# Patient Record
Sex: FEMALE | Race: WHITE | NOT HISPANIC OR LATINO | Employment: FULL TIME | URBAN - NONMETROPOLITAN AREA
[De-identification: names, ages, dates, MRNs, and addresses within clinical notes are randomized per-mention and may not be internally consistent; named-entity substitution may affect disease eponyms.]

---

## 2020-04-22 ENCOUNTER — APPOINTMENT (EMERGENCY)
Dept: RADIOLOGY | Facility: HOSPITAL | Age: 30
End: 2020-04-22
Payer: COMMERCIAL

## 2020-04-22 ENCOUNTER — HOSPITAL ENCOUNTER (EMERGENCY)
Facility: HOSPITAL | Age: 30
Discharge: HOME | End: 2020-04-22
Attending: EMERGENCY MEDICINE | Admitting: EMERGENCY MEDICINE
Payer: COMMERCIAL

## 2020-04-22 VITALS
HEIGHT: 62 IN | DIASTOLIC BLOOD PRESSURE: 78 MMHG | HEART RATE: 92 BPM | TEMPERATURE: 98.4 F | BODY MASS INDEX: 38.64 KG/M2 | OXYGEN SATURATION: 97 % | SYSTOLIC BLOOD PRESSURE: 121 MMHG | RESPIRATION RATE: 18 BRPM | WEIGHT: 210 LBS

## 2020-04-22 DIAGNOSIS — J40 BRONCHITIS: Primary | ICD-10-CM

## 2020-04-22 PROCEDURE — 99282 EMERGENCY DEPT VISIT SF MDM: CPT | Performed by: PHYSICIAN ASSISTANT

## 2020-04-22 PROCEDURE — 71045 X-RAY EXAM CHEST 1 VIEW: CPT

## 2020-04-22 PROCEDURE — 87635 SARS-COV-2 COVID-19 AMP PRB: CPT | Performed by: PHYSICIAN ASSISTANT

## 2020-04-22 PROCEDURE — 99283 EMERGENCY DEPT VISIT LOW MDM: CPT

## 2020-04-22 RX ORDER — ESCITALOPRAM OXALATE 20 MG/1
20 TABLET ORAL
COMMUNITY
Start: 2019-09-29

## 2020-04-22 RX ORDER — TRAZODONE HYDROCHLORIDE 50 MG/1
50 TABLET ORAL
COMMUNITY

## 2020-04-22 RX ORDER — OXYBUTYNIN CHLORIDE 5 MG/1
5 TABLET ORAL
COMMUNITY
Start: 2020-03-18

## 2020-04-23 LAB — SARS-COV-2 RNA SPEC QL NAA+PROBE: NOT DETECTED

## 2022-07-15 ENCOUNTER — OFFICE VISIT (OUTPATIENT)
Dept: FAMILY MEDICINE CLINIC | Facility: CLINIC | Age: 32
End: 2022-07-15
Payer: COMMERCIAL

## 2022-07-15 ENCOUNTER — PATIENT OUTREACH (OUTPATIENT)
Dept: FAMILY MEDICINE CLINIC | Facility: CLINIC | Age: 32
End: 2022-07-15

## 2022-07-15 VITALS
DIASTOLIC BLOOD PRESSURE: 72 MMHG | WEIGHT: 229 LBS | BODY MASS INDEX: 42.14 KG/M2 | OXYGEN SATURATION: 98 % | TEMPERATURE: 98 F | HEIGHT: 62 IN | HEART RATE: 88 BPM | SYSTOLIC BLOOD PRESSURE: 122 MMHG

## 2022-07-15 DIAGNOSIS — Z00.00 ANNUAL PHYSICAL EXAM: Primary | ICD-10-CM

## 2022-07-15 DIAGNOSIS — F51.01 PRIMARY INSOMNIA: ICD-10-CM

## 2022-07-15 DIAGNOSIS — E66.01 MORBID OBESITY WITH BMI OF 40.0-44.9, ADULT (HCC): ICD-10-CM

## 2022-07-15 DIAGNOSIS — F41.9 ANXIETY AND DEPRESSION: ICD-10-CM

## 2022-07-15 DIAGNOSIS — F32.A ANXIETY AND DEPRESSION: ICD-10-CM

## 2022-07-15 DIAGNOSIS — E78.00 ELEVATED LDL CHOLESTEROL LEVEL: ICD-10-CM

## 2022-07-15 DIAGNOSIS — Z80.3 FAMILY HISTORY OF BREAST CANCER: ICD-10-CM

## 2022-07-15 DIAGNOSIS — N32.81 OVERACTIVE BLADDER: ICD-10-CM

## 2022-07-15 PROCEDURE — 99385 PREV VISIT NEW AGE 18-39: CPT | Performed by: NURSE PRACTITIONER

## 2022-07-15 PROCEDURE — 3725F SCREEN DEPRESSION PERFORMED: CPT | Performed by: NURSE PRACTITIONER

## 2022-07-15 RX ORDER — LORAZEPAM 0.5 MG/1
0.5 TABLET ORAL AS NEEDED
COMMUNITY

## 2022-07-15 RX ORDER — MEDROXYPROGESTERONE ACETATE 150 MG/ML
INJECTION, SUSPENSION INTRAMUSCULAR
COMMUNITY
Start: 2022-05-19

## 2022-07-15 NOTE — ASSESSMENT & PLAN NOTE
- PHQ-2 score of 2 today  - Depression main concern lately  - Sees therapist every 3 weeks  - Informed of our behavioral services  - Told to f/u prn

## 2022-07-15 NOTE — PROGRESS NOTES
Called Patient to introduce her to Care Management program  Patient consented to services  Reviewed health status and current weight loss program  Made suggestion to complete a food diary for a couple days and will meet on phone 7/29 to review and make suggestions for food choices

## 2022-07-15 NOTE — ASSESSMENT & PLAN NOTE
-Had recent bloodwork with past PCP  -LDL "still elevated but improved" according to patient      Consider labwork at next visit to re-evaluate

## 2022-07-15 NOTE — PROGRESS NOTES
ADULT ANNUAL [address] IN PARTNERSHIP WITH [facility]    NAME: Merlyn Hamilton  AGE: 27 y o  SEX: female  : 1990     DATE: 7/15/2022     Assessment and Plan:     - reach out if you need any medication refills  - continue GYN services as recommended  - follow-up in 6 months, we will discuss need for labwork at that time   - had labs a few months ago at past PCP  - continue [facility] weight loss program  - up to date with cervical CA screening, done this year with [facility]      Problem List Items Addressed This Visit        Genitourinary    Overactive bladder     Controlled with ditropan, managed by GYN            Other    Anxiety and depression     - PHQ-2 score of 2 today  - Depression main concern lately  - Sees therapist every 3 weeks  - Informed of our behavioral services  - Told to f/u prn         Relevant Medications    LORazepam (ATIVAN) 0 5 mg tablet    Primary insomnia     trazodone effective "more often than not"         Family history of breast cancer     Working with GYN team at [facility] for genetic testing         Elevated LDL cholesterol level     -Had recent bloodwork with past PCP  -LDL "still elevated but improved" according to patient      Consider labwork at next visit to re-evaluate            Morbid obesity with BMI of 40 0-44 9, adult ([address] )     Doing [facility] weight loss program         Relevant Orders    Ambulatory Referral to Complex Care Management Program      Other Visit Diagnoses     Annual physical exam    -  Primary          Immunizations and preventive care screenings were discussed with patient today  Appropriate education was printed on patient's after visit summary  Counseling:  Exercise: the importance of regular exercise/physical activity was discussed  Recommend exercise 3-5 times per week for at least 30 minutes  BMI Counseling: Body mass index is 41 88 kg/m²   The BMI is above normal  Nutrition recommendations include decreasing portion sizes and consuming healthier snacks  Exercise recommendations include exercising 3-5 times per week  No pharmacotherapy was ordered  Rationale for BMI follow-up plan is due to patient being overweight or obese  Return in 6 months (on 1/15/2023) for Recheck  Chief Complaint:     Chief Complaint   Patient presents with    Physical Exam    BMI follow up      History of Present Illness:     Adult Annual Physical   Patient here for a comprehensive physical exam  The patient reports no problems  Diet and Physical Activity  Diet/Nutrition: well balanced diet  Exercise: walking  Depression Screening  PHQ-2/9 Depression Screening    Little interest or pleasure in doing things: 1 - several days  Feeling down, depressed, or hopeless: 1 - several days  PHQ-2 Score: 2  PHQ-2 Interpretation: Negative depression screen       General Health  Sleep: sleeps well  Hearing: normal - bilateral   Vision: no vision problems  Dental: regular dental visits  /GYN Health  Last menstrual period: unknown  History of STDs?: no      Review of Systems:     Review of Systems   Constitutional: Negative  HENT: Negative  Eyes: Negative  Respiratory: Negative  Cardiovascular: Negative  Gastrointestinal: Negative  Endocrine: Negative  Genitourinary: Negative  Musculoskeletal: Negative  Skin: Negative  Allergic/Immunologic: Negative  Neurological: Negative  Hematological: Negative  Psychiatric/Behavioral: Negative  Negative for self-injury and suicidal ideas        Past Medical History:     Past Medical History:   Diagnosis Date    Arthritis     Depression     Migraines       Past Surgical History:     Past Surgical History:   Procedure Laterality Date    SHOULDER ARTHROSCOPY Left     SHOULDER SURGERY      WISDOM TOOTH EXTRACTION      WISDOM TOOTH EXTRACTION        Social History:     Social History Socioeconomic History    Marital status: /Civil Union     Spouse name: None    Number of children: None    Years of education: None    Highest education level: None   Occupational History    None   Tobacco Use    Smoking status: Never Smoker    Smokeless tobacco: Never Used   Vaping Use    Vaping Use: Never used   Substance and Sexual Activity    Alcohol use: Not Currently    Drug use: Never    Sexual activity: None   Other Topics Concern    None   Social History Narrative    None     Social Determinants of Health     Financial Resource Strain: Not on file   Food Insecurity: Not on file   Transportation Needs: Not on file   Physical Activity: Not on file   Stress: Not on file   Social Connections: Not on file   Intimate Partner Violence: Not on file   Housing Stability: Not on file      Family History:     Family History   Problem Relation Age of Onset    Heart failure Mother     Heart disease Father     Thyroid disease Father     Hypertension Maternal Grandfather     Diabetes Paternal Grandmother     Hypertension Paternal Grandmother     Diabetes Paternal Grandfather     Thyroid disease Paternal Aunt     Breast cancer Paternal Aunt       Current Medications:     Current Outpatient Medications   Medication Sig Dispense Refill    escitalopram (LEXAPRO) 20 mg tablet Take 20 mg by mouth      LORazepam (ATIVAN) 0 5 mg tablet Take 0 5 mg by mouth as needed      medroxyPROGESTERone (DEPO-PROVERA) 150 mg/mL injection INJECT 150 MG IM ONCE,INSTR:BRING TO OFFICE FOR ADMINISTRATION      oxybutynin (DITROPAN) 5 mg tablet Take 5 mg by mouth      traZODone (DESYREL) 50 mg tablet Take 50 mg by mouth       No current facility-administered medications for this visit  Allergies:      Allergies   Allergen Reactions    Bupropion Hives    Sulfamethoxazole-Trimethoprim Hives      Physical Exam:     /72 (BP Location: Left arm, Patient Position: Sitting, Cuff Size: Large)   Pulse 88 Temp 98 °F (36 7 °C)   Ht 5' 2" (1 575 m)   Wt 104 kg (229 lb)   SpO2 98%   BMI 41 88 kg/m²     Physical Exam  Vitals and nursing note reviewed  Constitutional:       Appearance: Normal appearance  She is obese  HENT:      Head: Normocephalic  Right Ear: Tympanic membrane normal  There is no impacted cerumen  Left Ear: Tympanic membrane normal       Nose: Nose normal  No congestion  Mouth/Throat:      Mouth: Mucous membranes are moist       Pharynx: Oropharynx is clear  No oropharyngeal exudate  Eyes:      Extraocular Movements: Extraocular movements intact  Conjunctiva/sclera: Conjunctivae normal       Pupils: Pupils are equal, round, and reactive to light  Cardiovascular:      Rate and Rhythm: Normal rate and regular rhythm  Pulses: Normal pulses  Heart sounds: Normal heart sounds  No murmur heard  Pulmonary:      Effort: Pulmonary effort is normal  No respiratory distress  Breath sounds: Normal breath sounds  No wheezing  Abdominal:      General: Bowel sounds are normal       Palpations: Abdomen is soft  Tenderness: There is no abdominal tenderness  Musculoskeletal:         General: No swelling, tenderness, deformity or signs of injury  Normal range of motion  Cervical back: Normal range of motion and neck supple  No tenderness  Right lower leg: No edema  Left lower leg: No edema  Lymphadenopathy:      Cervical: No cervical adenopathy  Skin:     General: Skin is warm and dry  Capillary Refill: Capillary refill takes less than 2 seconds  Findings: No rash  Neurological:      General: No focal deficit present  Mental Status: She is alert and oriented to person, place, and time  Cranial Nerves: No cranial nerve deficit  Sensory: No sensory deficit  Motor: No weakness        Coordination: Coordination normal       Gait: Gait normal       Deep Tendon Reflexes: Reflexes normal    Psychiatric:         Mood and Affect: Mood normal          Behavior: Behavior normal           Ray Gomez, [address] WITH  [facility]

## 2022-07-15 NOTE — PATIENT INSTRUCTIONS
- reach out if you need any medication refills  - continue GYN services as recommended  - follow-up in 6 months, we will discuss need for labwork at that time  - continue [facility] weight loss program        Wellness Visit for Adults   AMBULATORY CARE:   A wellness visit  is when you see your healthcare provider to get screened for health problems  Your healthcare provider will also give you advice on how to stay healthy  Write down your questions so you remember to ask them  Ask your healthcare provider how often you should have a wellness visit  What happens at a wellness visit:  Your healthcare provider will ask about your health, and your family history of health problems  This includes high blood pressure, heart disease, and cancer  He or she will ask if you have symptoms that concern you, if you smoke, and about your mood  You may also be asked about your intake of medicines, supplements, food, and alcohol  Any of the following may be done: Your weight  will be checked  Your height may also be checked so your body mass index (BMI) can be calculated  Your BMI shows if you are at a healthy weight  Your blood pressure  and heart rate will be checked  Your temperature may also be checked  Blood and urine tests  may be done  Blood tests may be done to check your cholesterol levels  Abnormal cholesterol levels increase your risk for heart disease and stroke  You may also need a blood or urine test to check for diabetes if you are at increased risk  Urine tests may be done to look for signs of an infection or kidney disease  A physical exam  includes checking your heartbeat and lungs with a stethoscope  Your healthcare provider may also check your skin to look for sun damage  Screening tests  may be recommended  A screening test is done to check for diseases that may not cause symptoms  The screening tests you may need depend on your age, gender, family history, and lifestyle habits   For example, colorectal screening may be recommended if you are 48years old or older  Screening tests you need if you are a woman:   A Pap smear  is used to screen for cervical cancer  Pap smears are usually done every 3 to 5 years depending on your age  You may need them more often if you have had abnormal Pap smear test results in the past  Ask your healthcare provider how often you should have a Pap smear  A mammogram  is an x-ray of your breasts to screen for breast cancer  Experts recommend mammograms every 2 years starting at age 48 years  You may need a mammogram at age 52 years or younger if you have an increased risk for breast cancer  Talk to your healthcare provider about when you should start having mammograms and how often you need them  Vaccines you may need:   Get an influenza vaccine  every year  The influenza vaccine protects you from the flu  Several types of viruses cause the flu  The viruses change over time, so new vaccines are made each year  Get a tetanus-diphtheria (Td) booster vaccine  every 10 years  This vaccine protects you against tetanus and diphtheria  Tetanus is a severe infection that may cause painful muscle spasms and lockjaw  Diphtheria is a severe bacterial infection that causes a thick covering in the back of your mouth and throat  Get a human papillomavirus (HPV) vaccine  if you are female and aged 23 to 32 or male 23 to 24 and never received it  This vaccine protects you from HPV infection  HPV is the most common infection spread by sexual contact  HPV may also cause vaginal, penile, and anal cancers  Get a pneumococcal vaccine  if you are aged 72 years or older  The pneumococcal vaccine is an injection given to protect you from pneumococcal disease  Pneumococcal disease is an infection caused by pneumococcal bacteria  The infection may cause pneumonia, meningitis, or an ear infection  Get a shingles vaccine  if you are 60 or older, even if you have had shingles before   The shingles vaccine is an injection to protect you from the varicella-zoster virus  This is the same virus that causes chickenpox  Shingles is a painful rash that develops in people who had chickenpox or have been exposed to the virus  How to eat healthy:  My Plate is a model for planning healthy meals  It shows the types and amounts of foods that should go on your plate  Fruits and vegetables make up about half of your plate, and grains and protein make up the other half  A serving of dairy is included on the side of your plate  The amount of calories and serving sizes you need depends on your age, gender, weight, and height  Examples of healthy foods are listed below:  Eat a variety of vegetables  such as dark green, red, and orange vegetables  You can also include canned vegetables low in sodium (salt) and frozen vegetables without added butter or sauces  Eat a variety of fresh fruits , canned fruit in 100% juice, frozen fruit, and dried fruit  Include whole grains  At least half of the grains you eat should be whole grains  Examples include whole-wheat bread, wheat pasta, brown rice, and whole-grain cereals such as oatmeal     Eat a variety of protein foods such as seafood (fish and shellfish), lean meat, and poultry without skin (turkey and chicken)  Examples of lean meats include pork leg, shoulder, or tenderloin, and beef round, sirloin, tenderloin, and extra lean ground beef  Other protein foods include eggs and egg substitutes, beans, peas, soy products, nuts, and seeds  Choose low-fat dairy products such as skim or 1% milk or low-fat yogurt, cheese, and cottage cheese  Limit unhealthy fats  such as butter, hard margarine, and shortening  Exercise:  Exercise at least 30 minutes per day on most days of the week  Some examples of exercise include walking, biking, dancing, and swimming   You can also fit in more physical activity by taking the stairs instead of the elevator or parking farther away from stores  Include muscle strengthening activities 2 days each week  Regular exercise provides many health benefits  It helps you manage your weight, and decreases your risk for type 2 diabetes, heart disease, stroke, and high blood pressure  Exercise can also help improve your mood  Ask your healthcare provider about the best exercise plan for you  General health and safety guidelines:   Do not smoke  Nicotine and other chemicals in cigarettes and cigars can cause lung damage  Ask your healthcare provider for information if you currently smoke and need help to quit  E-cigarettes or smokeless tobacco still contain nicotine  Talk to your healthcare provider before you use these products  Limit alcohol  A drink of alcohol is 12 ounces of beer, 5 ounces of wine, or 1½ ounces of liquor  Lose weight, if needed  Being overweight increases your risk of certain health conditions  These include heart disease, high blood pressure, type 2 diabetes, and certain types of cancer  Protect your skin  Do not sunbathe or use tanning beds  Use sunscreen with a SPF 15 or higher  Apply sunscreen at least 15 minutes before you go outside  Reapply sunscreen every 2 hours  Wear protective clothing, hats, and sunglasses when you are outside  Drive safely  Always wear your seatbelt  Make sure everyone in your car wears a seatbelt  A seatbelt can save your life if you are in an accident  Do not use your cell phone when you are driving  This could distract you and cause an accident  Pull over if you need to make a call or send a text message  Practice safe sex  Use latex condoms if are sexually active and have more than one partner  Your healthcare provider may recommend screening tests for sexually transmitted infections (STIs)  Wear helmets, lifejackets, and protective gear  Always wear a helmet when you ride a bike or motorcycle, go skiing, or play sports that could cause a head injury   Wear protective equipment when you play sports  Wear a lifejacket when you are on a boat or doing water sports  © Copyright [employer] 2022 Information is for End User's use only and may not be sold, redistributed or otherwise used for commercial purposes  All illustrations and images included in CareNotes® are the copyrighted property of A D A M , Inc  or Sunil Hung   The above information is an  only  It is not intended as medical advice for individual conditions or treatments  Talk to your doctor, nurse or pharmacist before following any medical regimen to see if it is safe and effective for you  Weight Management   AMBULATORY CARE:   Why it is important to manage your weight:  Being overweight increases your risk of health conditions such as heart disease, high blood pressure, type 2 diabetes, and certain types of cancer  It can also increase your risk for osteoarthritis, sleep apnea, and other respiratory problems  Aim for a slow, steady weight loss  Even a small amount of weight loss can lower your risk of health problems  Risks of being overweight:  Extra weight can cause many health problems, including the following:  Diabetes (high blood sugar level)    High blood pressure or high cholesterol    Heart disease    Stroke    Gallbladder or liver disease    Cancer of the colon, breast, prostate, liver, or kidney    Sleep apnea    Arthritis or gout    Screening  is done to check for health conditions before you have signs or symptoms  If you are 28to 79years old, your blood sugar level may be checked every 3 years for signs of prediabetes or diabetes  Your healthcare provider will check your blood pressure at each visit  High blood pressure can lead to a stroke or other problems  Your provider may check for signs of heart disease, cancer, or other health problems  How to lose weight safely:  A safe and healthy way to lose weight is to eat fewer calories and get regular exercise    You can lose up about 1 pound a week by decreasing the number of calories you eat by 500 calories each day  You can decrease calories by eating smaller portion sizes or by cutting out high-calorie foods  Read labels to find out how many calories are in the foods you eat  You can also burn calories with exercise such as walking, swimming, or biking  You will be more likely to keep weight off if you make these changes part of your lifestyle  Exercise at least 30 minutes per day on most days of the week  You can also fit in more physical activity by taking the stairs instead of the elevator or parking farther away from stores  Ask your healthcare provider about the best exercise plan for you  Healthy meal plan for weight management:  A healthy meal plan includes a variety of foods, contains fewer calories, and helps you stay healthy  A healthy meal plan includes the following:     Eat whole-grain foods more often  A healthy meal plan should contain fiber  Fiber is the part of grains, fruits, and vegetables that is not broken down by your body  Whole-grain foods are healthy and provide extra fiber in your diet  Some examples of whole-grain foods are whole-wheat breads and pastas, oatmeal, brown rice, and bulgur  Eat a variety of vegetables every day  Include dark, leafy greens such as spinach, kale, nima greens, and mustard greens  Eat yellow and orange vegetables such as carrots, sweet potatoes, and winter squash  Eat a variety of fruits every day  Choose fresh or canned fruit (canned in its own juice or light syrup) instead of juice  Fruit juice has very little or no fiber  Eat low-fat dairy foods  Drink fat-free (skim) milk or 1% milk  Eat fat-free yogurt and low-fat cottage cheese  Try low-fat cheeses such as mozzarella and other reduced-fat cheeses  Choose meat and other protein foods that are low in fat  Choose beans or other legumes such as split peas or lentils   Choose fish, skinless poultry (chicken or turkey), or lean cuts of red meat (beef or pork)  Before you cook meat or poultry, cut off any visible fat  Use less fat and oil  Try baking foods instead of frying them  Add less fat, such as margarine, sour cream, regular salad dressing and mayonnaise to foods  Eat fewer high-fat foods  Some examples of high-fat foods include french fries, doughnuts, ice cream, and cakes  Eat fewer sweets  Limit foods and drinks that are high in sugar  This includes candy, cookies, regular soda, and sweetened drinks  Ways to decrease calories:   Eat smaller portions  Use a small plate with smaller servings  Do not eat second helpings  When you eat at a restaurant, ask for a box and place half of your meal in the box before you eat  Share an entrée with someone else  Replace high-calorie snacks with healthy, low-calorie snacks  Choose fresh fruit, vegetables, fat-free rice cakes, or air-popped popcorn instead of potato chips, nuts, or chocolate  Choose water or calorie-free drinks instead of soda or sweetened drinks  Do not shop for groceries when you are hungry  You may be more likely to make unhealthy food choices  Take a grocery list of healthy foods and shop after you have eaten  Eat regular meals  Do not skip meals  Skipping meals can lead to overeating later in the day  This can make it harder for you to lose weight  Eat a healthy snack in place of a meal if you do not have time to eat a regular meal  Talk with a dietitian to help you create a meal plan and schedule that is right for you  Other things to consider as you try to lose weight:   Be aware of situations that may give you the urge to overeat, such as eating while watching television  Find ways to avoid these situations  For example, read a book, go for a walk, or do crafts  Meet with a weight loss support group or friends who are also trying to lose weight   This may help you stay motivated to continue working on your weight loss goals  © Copyright [employer] 2022 Information is for End User's use only and may not be sold, redistributed or otherwise used for commercial purposes  All illustrations and images included in CareNotes® are the copyrighted property of A D A M , Inc  or Sunil Hernandez  The above information is an  only  It is not intended as medical advice for individual conditions or treatments  Talk to your doctor, nurse or pharmacist before following any medical regimen to see if it is safe and effective for you

## 2022-07-29 ENCOUNTER — PATIENT OUTREACH (OUTPATIENT)
Dept: FAMILY MEDICINE CLINIC | Facility: CLINIC | Age: 32
End: 2022-07-29

## 2022-07-29 NOTE — PROGRESS NOTES
Had touch base time schedule with patient for today at 1000  Unable to reach patent  Left message with contact phone number and hours available  Requested return call to follow up on progress with [facility] program as well as recording calories for baseline assessment  Will reach out again next week if no return call

## 2022-08-05 ENCOUNTER — PATIENT OUTREACH (OUTPATIENT)
Dept: FAMILY MEDICINE CLINIC | Facility: CLINIC | Age: 32
End: 2022-08-05

## 2022-08-05 NOTE — PROGRESS NOTES
Second attempt made to connect with patient after she missed scheduled call on 7/29  Unable to reach patient, voicemail left with callback information and hours I can be reached  Unable to reach letter will be sent Monday if no response today

## 2022-08-08 ENCOUNTER — PATIENT OUTREACH (OUTPATIENT)
Dept: FAMILY MEDICINE CLINIC | Facility: CLINIC | Age: 32
End: 2022-08-08

## 2022-08-08 NOTE — LETTER
Date: 08/08/22    Dear Lashon Camarillo,   I have not been able to reach you since our initial conversation and would like to set a time that I can talk with you over the phone or in-person  I would like to see how you are doing with the [facility] program  Signing up for a program to help modify your lifestyle and make better choices in order to optimize your health is a big decision and I am here to help you be successful  I have enclosed some additional information for you  Please call me with any questions you may have     Sincerely,    Cherie Moses, MSN RN  [phone]  Outpatient Care Manager

## 2022-08-08 NOTE — PROGRESS NOTES
Patient missed scheduled phone call  Follow up calls were not received nor any calls returned   Unable to reach letter sent and removed self from care team

## 2022-08-22 ENCOUNTER — PATIENT OUTREACH (OUTPATIENT)
Dept: FAMILY MEDICINE CLINIC | Facility: CLINIC | Age: 32
End: 2022-08-22

## 2022-08-22 NOTE — PROGRESS NOTES
Sent patient a [employer] message to notify her that due to lack of response I will be discontinuing care management services with her  I provided my direct number and hours available should she desire to have any additional help with health management

## 2022-08-31 ENCOUNTER — OFFICE VISIT (OUTPATIENT)
Dept: FAMILY MEDICINE CLINIC | Facility: CLINIC | Age: 32
End: 2022-08-31
Payer: COMMERCIAL

## 2022-08-31 VITALS
BODY MASS INDEX: 42.14 KG/M2 | TEMPERATURE: 97.8 F | SYSTOLIC BLOOD PRESSURE: 122 MMHG | HEIGHT: 62 IN | DIASTOLIC BLOOD PRESSURE: 76 MMHG | HEART RATE: 70 BPM | OXYGEN SATURATION: 98 % | WEIGHT: 229 LBS

## 2022-08-31 DIAGNOSIS — R53.83 FATIGUE, UNSPECIFIED TYPE: ICD-10-CM

## 2022-08-31 DIAGNOSIS — J01.90 ACUTE NON-RECURRENT SINUSITIS, UNSPECIFIED LOCATION: Primary | ICD-10-CM

## 2022-08-31 DIAGNOSIS — H92.01 EAR PAIN, RIGHT: ICD-10-CM

## 2022-08-31 DIAGNOSIS — Z02.1 NEED FOR HISTORY AND PHYSICAL EXAMINATION FOR EMPLOYMENT: ICD-10-CM

## 2022-08-31 DIAGNOSIS — J06.9 VIRAL UPPER RESPIRATORY TRACT INFECTION: ICD-10-CM

## 2022-08-31 LAB
SARS-COV-2 AG UPPER RESP QL IA: NEGATIVE
VALID CONTROL: NORMAL

## 2022-08-31 PROCEDURE — 87811 SARS-COV-2 COVID19 W/OPTIC: CPT | Performed by: NURSE PRACTITIONER

## 2022-08-31 PROCEDURE — 99213 OFFICE O/P EST LOW 20 MIN: CPT | Performed by: NURSE PRACTITIONER

## 2022-08-31 PROCEDURE — 86580 TB INTRADERMAL TEST: CPT

## 2022-08-31 PROCEDURE — AMOXICILLIN 875MG 20 AMOXICILLIN 875MG 20: Performed by: NURSE PRACTITIONER

## 2022-08-31 RX ORDER — AMOXICILLIN AND CLAVULANATE POTASSIUM 875; 125 MG/1; MG/1
1 TABLET, FILM COATED ORAL EVERY 12 HOURS SCHEDULED
Qty: 20 TABLET | Refills: 0
Start: 2022-08-31 | End: 2022-09-10

## 2022-08-31 NOTE — PATIENT INSTRUCTIONS
Start taking antibiotic if s/s do not improve by Friday  Call the office with any questions or concerns    Sinusitis   AMBULATORY CARE:   Sinusitis  is inflammation or infection of your sinuses  Sinusitis is most often caused by a virus  Acute sinusitis may last up to 12 weeks  Chronic sinusitis lasts longer than 12 weeks  Recurrent sinusitis means you have 4 or more infections in 1 year  Common signs and symptoms:   Fever    Pain, pressure, redness, or swelling around the forehead, cheeks, or eyes    Thick yellow or green discharge from your nose    Tenderness when you touch your face over your sinuses    Dry cough that happens mostly at night or when you lie down    Headache and face pain that is worse when you lean forward    Tooth pain, or pain when you chew    Seek care immediately if:   You have trouble breathing or wheezing that is getting worse  You have a stiff neck, a fever, or a bad headache  You cannot open your eye  Your eyeball bulges out or you cannot move your eye  You are more sleepy than normal, or you notice changes in your ability to think, move, or talk  You have swelling of your forehead or scalp  Call your doctor if:   You have vision changes, such as double vision  Your eye and eyelid are red, swollen, and painful  Your symptoms do not improve or go away after 10 days  You have nausea and are vomiting  Your nose is bleeding  You have questions or concerns about your condition or care  Medicines: Your symptoms may go away on their own  Your healthcare provider may recommend watchful waiting for up to 10 days before starting antibiotics  You may need any of the following:  Acetaminophen  decreases pain and fever  It is available without a doctor's order  Ask how much to take and how often to take it  Follow directions   Read the labels of all other medicines you are using to see if they also contain acetaminophen, or ask your doctor or pharmacist  Acetaminophen can cause liver damage if not taken correctly  Do not use more than 4 grams (4,000 milligrams) total of acetaminophen in one day  NSAIDs , such as ibuprofen, help decrease swelling, pain, and fever  This medicine is available with or without a doctor's order  NSAIDs can cause stomach bleeding or kidney problems in certain people  If you take blood thinner medicine, always ask your healthcare provider if NSAIDs are safe for you  Always read the medicine label and follow directions  Nasal steroid sprays  may help decrease inflammation in your nose and sinuses  Decongestants  help reduce swelling and drain mucus in the nose and sinuses  They may help you breathe easier  Antihistamines  help dry mucus in the nose and relieve sneezing  Antibiotics  help treat or prevent a bacterial infection  Self-care:   Rinse your sinuses as directed  Use a sinus rinse device to rinse your nasal passages with a saline (salt water) solution or distilled water  Do not use tap water  This will help thin the mucus in your nose and rinse away pollen and dirt  It will also help reduce swelling so you can breathe normally  Use a humidifier  to increase air moisture in your home  This may make it easier for you to breathe and help decrease your cough  Sleep with your head elevated  Place an extra pillow under your head before you go to sleep to help your sinuses drain  Drink liquids as directed  Ask your healthcare provider how much liquid to drink each day and which liquids are best for you  Liquids will thin the mucus in your nose and help it drain  Avoid drinks that contain alcohol or caffeine  Do not smoke, and avoid secondhand smoke  Nicotine and other chemicals in cigarettes and cigars can make your symptoms worse  Ask your healthcare provider for information if you currently smoke and need help to quit  E-cigarettes or smokeless tobacco still contain nicotine   Talk to your healthcare provider before you use these products  Prevent the spread of germs:   Wash your hands often with soap and water  Wash your hands after you use the bathroom, change a child's diaper, or sneeze  Wash your hands before you prepare or eat food  Stay away from people who are sick  Some germs spread easily and quickly through contact  Follow up with your doctor as directed: You may be referred to an ear, nose, and throat specialist  Write down your questions so you remember to ask them during your visits  © Copyright [employer] 2022 Information is for End User's use only and may not be sold, redistributed or otherwise used for commercial purposes  All illustrations and images included in CareNotes® are the copyrighted property of [employer]  or Sunil Hernandez  The above information is an  only  It is not intended as medical advice for individual conditions or treatments  Talk to your doctor, nurse or pharmacist before following any medical regimen to see if it is safe and effective for you

## 2022-08-31 NOTE — PROGRESS NOTES
Assessment/Plan:    POC covid test negative today    Presentation suggestive rhinosinusitis, viral versus bacterial   Patient is at day 7 of symptoms  Currently experiencing a double sickening  Provided patient with 10 day regimen of Augmentin for her to start on Friday or Saturday if signs and symptoms fail to improve  Discussed what signs and symptoms warrant emergent medical care  Patient verbalized understanding  PPD test also administered for adoption paperwork  No problem-specific Assessment & Plan notes found for this encounter  Diagnoses and all orders for this visit:    Acute non-recurrent sinusitis, unspecified location  -     amoxicillin-clavulanate (Augmentin) 875-125 mg per tablet; Take 1 tablet by mouth every 12 (twelve) hours for 10 days    Viral upper respiratory tract infection  -     POCT Rapid Covid Ag    Ear pain, right    Fatigue, unspecified type    Need for history and physical examination for employment  -     TB Skin Test        Subjective:      Patient ID: Arline Muñoz is a 27 y o  female  - Onset of symptoms/condition: Thursday  N/V Th-Sat, fever (TMAX 103 last Friday, temp broke that night), lump in throat and R ear pain which developed last night     - Alleviated with: better in AM hours    - Treatments so far: hot compresses help with earache    Principal positive for covid last week           The following portions of the patient's history were reviewed and updated as appropriate: allergies, current medications, past family history, past medical history, past social history, past surgical history and problem list     Review of Systems   Constitutional: Positive for fatigue and fever  HENT: Positive for ear pain  Negative for congestion, ear discharge, sinus pain, sore throat and trouble swallowing          + lump in throat   Eyes: Negative  Respiratory: Negative  Negative for cough, shortness of breath and wheezing  Cardiovascular: Negative    Negative for chest pain  Gastrointestinal: Negative  Negative for nausea and vomiting  Endocrine: Negative  Genitourinary: Negative  Musculoskeletal: Positive for arthralgias  Skin: Negative  Allergic/Immunologic: Negative  Neurological: Negative  Negative for dizziness, light-headedness and headaches  Hematological: Negative  Psychiatric/Behavioral: Negative  Objective:      /76 (BP Location: Left arm, Patient Position: Sitting, Cuff Size: Large)   Pulse 70   Temp 97 8 °F (36 6 °C)   Ht 5' 2" (1 575 m)   Wt 104 kg (229 lb)   SpO2 98%   BMI 41 88 kg/m²          Physical Exam  Vitals and nursing note reviewed  Constitutional:       Appearance: Normal appearance  HENT:      Head: Normocephalic  Right Ear: Tympanic membrane and ear canal normal  There is no impacted cerumen  Left Ear: Tympanic membrane and ear canal normal  There is no impacted cerumen  Nose: Nose normal  No congestion  Mouth/Throat:      Mouth: Mucous membranes are moist       Pharynx: Posterior oropharyngeal erythema present  No oropharyngeal exudate  Tonsils: No tonsillar exudate or tonsillar abscesses  1+ on the right  1+ on the left  Eyes:      Extraocular Movements: Extraocular movements intact  Conjunctiva/sclera: Conjunctivae normal       Pupils: Pupils are equal, round, and reactive to light  Neck:        Comments: Tenderness R anterior neck upon palpation  Cardiovascular:      Rate and Rhythm: Normal rate and regular rhythm  Pulses: Normal pulses  Heart sounds: Normal heart sounds  Pulmonary:      Effort: Pulmonary effort is normal       Breath sounds: Normal breath sounds  Abdominal:      General: Bowel sounds are normal       Palpations: Abdomen is soft  Tenderness: There is no abdominal tenderness  Musculoskeletal:         General: Normal range of motion  Cervical back: Normal range of motion     Lymphadenopathy:      Cervical: No cervical adenopathy  Skin:     General: Skin is warm and dry  Capillary Refill: Capillary refill takes less than 2 seconds  Neurological:      General: No focal deficit present  Mental Status: She is alert and oriented to person, place, and time     Psychiatric:         Mood and Affect: Mood normal          Behavior: Behavior normal

## 2022-09-01 ENCOUNTER — RA CDI HCC (OUTPATIENT)
Dept: OTHER | Facility: HOSPITAL | Age: 32
End: 2022-09-01

## 2022-09-01 NOTE — PROGRESS NOTES
Please review if this dx  is applicable to the patient's condition and assess and document, if applicable in next visit     Morbid obesity ([facility] [address] ) [278 01  ICD-9-CM]    Major depressive disorder, single episode, mild (HCC) [296 21  ICD-9-CM]    [facility] [address]  coding opportunities          Chart Reviewed number of suggestions sent to Provider: 2     Patients Insurance        Commercial Insurance: [address] [place] [address]

## 2022-09-02 ENCOUNTER — CLINICAL SUPPORT (OUTPATIENT)
Dept: FAMILY MEDICINE CLINIC | Facility: CLINIC | Age: 32
End: 2022-09-02

## 2022-09-02 VITALS
SYSTOLIC BLOOD PRESSURE: 120 MMHG | HEART RATE: 88 BPM | BODY MASS INDEX: 42.14 KG/M2 | HEIGHT: 62 IN | TEMPERATURE: 98.2 F | DIASTOLIC BLOOD PRESSURE: 70 MMHG | OXYGEN SATURATION: 98 % | WEIGHT: 229 LBS

## 2022-09-02 DIAGNOSIS — Z11.1 ENCOUNTER FOR PPD SKIN TEST READING: Primary | ICD-10-CM

## 2022-09-02 LAB
INDURATION: 0 MM
TB SKIN TEST: NEGATIVE

## 2022-11-14 ENCOUNTER — CLINICAL SUPPORT (OUTPATIENT)
Dept: FAMILY MEDICINE CLINIC | Facility: CLINIC | Age: 32
End: 2022-11-14

## 2022-11-14 VITALS
DIASTOLIC BLOOD PRESSURE: 76 MMHG | WEIGHT: 229 LBS | HEIGHT: 62 IN | BODY MASS INDEX: 42.14 KG/M2 | SYSTOLIC BLOOD PRESSURE: 124 MMHG

## 2022-11-14 DIAGNOSIS — Z78.9 ALWAYS USES CONTRACEPTION: Primary | ICD-10-CM

## 2022-11-14 RX ORDER — MEDROXYPROGESTERONE ACETATE 150 MG/ML
150 INJECTION, SUSPENSION INTRAMUSCULAR ONCE
Status: COMPLETED | OUTPATIENT
Start: 2022-11-14 | End: 2022-11-14

## 2022-11-14 RX ADMIN — MEDROXYPROGESTERONE ACETATE 150 MG: 150 INJECTION, SUSPENSION INTRAMUSCULAR at 15:40

## 2022-12-16 ENCOUNTER — TELEPHONE (OUTPATIENT)
Dept: FAMILY MEDICINE CLINIC | Facility: CLINIC | Age: 32
End: 2022-12-16

## 2022-12-16 ENCOUNTER — OFFICE VISIT (OUTPATIENT)
Dept: FAMILY MEDICINE CLINIC | Facility: CLINIC | Age: 32
End: 2022-12-16

## 2022-12-16 DIAGNOSIS — J06.9 VIRAL URI: Primary | ICD-10-CM

## 2022-12-16 DIAGNOSIS — J02.9 SORE THROAT: ICD-10-CM

## 2022-12-16 LAB
SARS-COV-2 AG UPPER RESP QL IA: NEGATIVE
VALID CONTROL: NORMAL

## 2022-12-16 NOTE — PATIENT INSTRUCTIONS
Treatment- Increase fluids, rest, acetaminophen or ibuprofen for fever/aches  OTC cold remedies OK but not too helpful, avoid OTC nasal Afrin-may use them for 1-2 days only due to rebound effect  Chicken soup, tea with honey and lemon (grandma's remedies work as well if not better than OTC's)      Viral symptoms should resolve in 1-2 weeks, please contact the office if issues persist

## 2022-12-16 NOTE — PROGRESS NOTES
Name: Ewa Dobson      : 1990      MRN: [id number]  Encounter Provider: CLARENCE Henley  Encounter Date: 2022   Encounter department: [address] [place] [address] test was negative  Presentation is most consistent with a viral URI, alternate differential influenza  Advised patient to continue with conservative measures over the weekend (rest, hydration, and OTC analgesics) and to call the office on Monday if her condition worsens or fails to improve  Discussed what signs and symptoms warrant emergent medical care  Patient verbalized understanding  1  Viral URI    2  Sore throat  -     POCT Rapid Covid Ag         Subjective      Patient reports having nausea, vomiting, and diarrhea that started on Monday  That has since resolved but on Wednesday, patient developed the following: Sore throat, fatigue, fever, and body aches  Patient reports having a fever last night of 101 9 °F   Patient's spouse is also sick currently  Patient reports having COVID-19 in January of this year and also reports being exposed to a COVID-positive patient last weekend  Patient denies respiratory distress  Patient did an at-home COVID test on Wednesday which was negative  Patient has tried rest and hot fluids for symptom control with minimal relief  Review of Systems   Constitutional: Positive for fatigue and fever  HENT: Positive for sore throat  Eyes: Negative  Respiratory: Negative  Cardiovascular: Negative  Gastrointestinal: Negative  Negative for abdominal pain, diarrhea, nausea and vomiting  Endocrine: Negative  Genitourinary: Negative  Musculoskeletal: Positive for myalgias  Skin: Negative  Allergic/Immunologic: Negative  Neurological: Negative  Hematological: Negative  Psychiatric/Behavioral: Negative          Current Outpatient Medications on File Prior to Visit   Medication Sig   • escitalopram (LEXAPRO) 20 mg tablet Take 20 mg by mouth   • LORazepam (ATIVAN) 0 5 mg tablet Take 0 5 mg by mouth as needed   • medroxyPROGESTERone (DEPO-PROVERA) 150 mg/mL injection INJECT 150 MG IM ONCE,INSTR:BRING TO OFFICE FOR ADMINISTRATION   • oxybutynin (DITROPAN) 5 mg tablet Take 5 mg by mouth   • traZODone (DESYREL) 50 mg tablet Take 50 mg by mouth       Objective     There were no vitals taken for this visit  Physical Exam  Constitutional:       General: She is not in acute distress  Appearance: Normal appearance  She is ill-appearing  HENT:      Head: Normocephalic  Right Ear: Tympanic membrane normal       Left Ear: Tympanic membrane normal       Nose: Nose normal  No congestion or rhinorrhea  Mouth/Throat:      Mouth: Mucous membranes are moist       Pharynx: Oropharynx is clear  Posterior oropharyngeal erythema present  No oropharyngeal exudate  Eyes:      Conjunctiva/sclera: Conjunctivae normal    Cardiovascular:      Rate and Rhythm: Normal rate and regular rhythm  Pulses: Normal pulses  Heart sounds: Normal heart sounds  Pulmonary:      Effort: Pulmonary effort is normal       Breath sounds: Normal breath sounds  Musculoskeletal:      Cervical back: Normal range of motion  Lymphadenopathy:      Cervical: Cervical adenopathy present  Skin:     General: Skin is warm and dry  Findings: No rash  Neurological:      General: No focal deficit present  Mental Status: She is alert and oriented to person, place, and time     Psychiatric:         Mood and Affect: Mood normal          Behavior: Behavior normal        CLARENCE Lopez

## 2022-12-16 NOTE — LETTER
December 16, 2022     Patient: Tanner Garcia  YOB: 1990  Date of Visit: 12/16/2022      To Whom it May Concern:    Nelda Bauman is under my professional care  River Esquivel was seen in my office on 12/16/2022  Teagangatito Esquivel may return to work on 12/19/22  Please excuse any absences the week of December 11th  If you have any questions or concerns, please don't hesitate to call           Sincerely,          CLARENCE Cornell        CC: No Recipients

## 2022-12-20 DIAGNOSIS — B96.89 BACTERIAL UPPER RESPIRATORY INFECTION: Primary | ICD-10-CM

## 2022-12-20 DIAGNOSIS — J06.9 BACTERIAL UPPER RESPIRATORY INFECTION: Primary | ICD-10-CM

## 2022-12-20 RX ORDER — AMOXICILLIN AND CLAVULANATE POTASSIUM 875; 125 MG/1; MG/1
1 TABLET, FILM COATED ORAL EVERY 12 HOURS SCHEDULED
Qty: 14 TABLET | Refills: 0 | Status: SHIPPED | OUTPATIENT
Start: 2022-12-20 | End: 2022-12-21 | Stop reason: SDUPTHER

## 2022-12-20 NOTE — PROGRESS NOTES
Patient's condition failed to improve with approximately 4 to 5 days of conservative treatment  Will send a 7-day regimen of Augmentin to the pharmacy on file to treat one of the following differentials: Bacterial URI vs ABRS

## 2022-12-21 DIAGNOSIS — J06.9 BACTERIAL UPPER RESPIRATORY INFECTION: ICD-10-CM

## 2022-12-21 DIAGNOSIS — B96.89 BACTERIAL UPPER RESPIRATORY INFECTION: ICD-10-CM

## 2022-12-21 RX ORDER — AMOXICILLIN AND CLAVULANATE POTASSIUM 875; 125 MG/1; MG/1
1 TABLET, FILM COATED ORAL EVERY 12 HOURS SCHEDULED
Qty: 14 TABLET | Refills: 0 | Status: SHIPPED | OUTPATIENT
Start: 2022-12-21 | End: 2022-12-28

## 2022-12-22 ENCOUNTER — OFFICE VISIT (OUTPATIENT)
Dept: OBGYN CLINIC | Facility: CLINIC | Age: 32
End: 2022-12-22

## 2022-12-22 VITALS
HEIGHT: 62 IN | HEART RATE: 111 BPM | DIASTOLIC BLOOD PRESSURE: 86 MMHG | SYSTOLIC BLOOD PRESSURE: 118 MMHG | BODY MASS INDEX: 41.22 KG/M2 | WEIGHT: 224 LBS | TEMPERATURE: 98.9 F

## 2022-12-22 DIAGNOSIS — Z12.31 ENCOUNTER FOR SCREENING MAMMOGRAM FOR BREAST CANCER: ICD-10-CM

## 2022-12-22 DIAGNOSIS — Z80.3 FAMILY HISTORY OF BREAST CANCER: Primary | ICD-10-CM

## 2022-12-22 DIAGNOSIS — Z12.39 BREAST CANCER SCREENING, HIGH RISK PATIENT: ICD-10-CM

## 2022-12-22 NOTE — PROGRESS NOTES
Assessment        Diagnoses and all orders for this visit:    Family history of breast cancer  -     Mammo screening bilateral w 3d & cad; Future    Breast cancer screening, high risk patient  -     Ambulatory Referral to Oncology Genetics; Future  -     Mammo screening bilateral w 3d & cad; Future    Encounter for screening mammogram for breast cancer  -     Mammo screening bilateral w 3d & cad; Future              Plan      All questions answered  Diagnosis explained in detail, including differential   Educational material distributed  Follow up in 7 months  Follow up as needed  Mammogram    Patient referred to genetic oncology for counseling and potential testing  Patient concerned about her risk for developing breast cancer  Discussed possible need for referral to surgical oncology for supplemental breast screening if indicated  Breast exam is normal today  Baseline mammogram was ordered  Follow-up in July for annual GYN exam    Subjective      Maria De Jesus Benitez is a 34 y o  female who presents to [facility] care      Chief Complaint   Patient presents with   • New Patient Visit     Patient with extended family history of breast cancer  Pt is interested in breast cancer genetic screening  Patient personally does not have any breast concerns at this moment  She is sexually active  She has a female partner,  x 11 years    She has no breast lumps, no pain, no nipple discharge  She has multiple family members with breast cancer  They all have the CHEK2 variant    She is on DEPO PROVERA due to recurrent ovarian cyst   She does not desire children  She has been on Depo for 8 years    Last Depo in November (gets this done at [address] [place] [address])    Last Pap:  7/2022 Normal per Brian canela Reading  Last mammogram: none    HPV vaccine completed:no  Current contraception: none  History of abnormal Pap smear: no  History of abnormal mammogram: no  Family history of uterine or ovarian cancer: maternal GGM (unknown age)  Family history of breast cancer: dad's half sister (late 45s)  Dad's 2 cousins (late 45s, early 46s)  2 Paternal great aunts (both 46s)    Family history of colon cancer: no        Menstrual History:  OB History        0    Para   0    Term   0       0    AB   0    Living   0       SAB   0    IAB   0    Ectopic   0    Multiple   0    Live Births   0                Menarche age: 7  No LMP recorded (lmp unknown)               Past Medical History:   Diagnosis Date   • Arthritis    • Depression    • Migraines      Past Surgical History:   Procedure Laterality Date   • SHOULDER ARTHROSCOPY Left    • SHOULDER SURGERY     • WISDOM TOOTH EXTRACTION     • WISDOM TOOTH EXTRACTION       Family History   Problem Relation Age of Onset   • Heart failure Mother    • Heart disease Father    • Thyroid disease Father    • Hypertension Maternal Grandfather    • Diabetes Paternal Grandmother    • Hypertension Paternal Grandmother    • Diabetes Paternal Grandfather    • Thyroid disease Paternal [address]    • Breast cancer Paternal [address]    • Breast cancer Paternal [address]    • Breast cancer Paternal Aunt    • Breast cancer Cousin    • Breast cancer Cousin        Social History     Tobacco Use   • Smoking status: Never   • Smokeless tobacco: Never   Vaping Use   • Vaping Use: Never used   Substance Use Topics   • Alcohol use: Not Currently   • Drug use: Never          Current Outpatient Medications:   •  amoxicillin-clavulanate (Augmentin) 875-125 mg per tablet, Take 1 tablet by mouth every 12 (twelve) hours for 7 days, Disp: 14 tablet, Rfl: 0  •  escitalopram (LEXAPRO) 20 mg tablet, Take 20 mg by mouth, Disp: , Rfl:   •  LORazepam (ATIVAN) 0 5 mg tablet, Take 0 5 mg by mouth as needed, Disp: , Rfl:   •  medroxyPROGESTERone (DEPO-PROVERA) 150 mg/mL injection, INJECT 150 MG IM ONCE,INSTR:BRING TO OFFICE FOR ADMINISTRATION, Disp: , Rfl:   •  oxybutynin (DITROPAN) 5 mg tablet, Take 5 mg by mouth, Disp: , Rfl:   • traZODone (DESYREL) 50 mg tablet, Take 50 mg by mouth, Disp: , Rfl:     Allergies   Allergen Reactions   • Bupropion Hives   • Sulfamethoxazole-Trimethoprim Hives           Review of Systems   Constitutional: Negative  HENT: Negative  Eyes: Negative  Respiratory: Negative  Cardiovascular: Negative  Gastrointestinal: Negative  Endocrine: Negative  Genitourinary:        As noted in HPI   Musculoskeletal: Negative  Skin: Negative  Allergic/Immunologic: Negative  Neurological: Negative  Hematological: Negative  Psychiatric/Behavioral: Negative  /86 (BP Location: Right arm, Patient Position: Sitting, Cuff Size: Adult)   Pulse (!) 111   Temp 98 9 °F (37 2 °C) (Tympanic)   Ht 5' 2" (1 575 m)   Wt 102 kg (224 lb)   LMP  (LMP Unknown) Comment: Patient does not get menses with depo injection  BMI 40 97 kg/m²         Physical Exam  Constitutional:       Appearance: Normal appearance  Genitourinary:   Breasts:     Right: Normal  No swelling, bleeding, inverted nipple, mass, nipple discharge, skin change or tenderness  Left: Normal  No bleeding, inverted nipple, mass, nipple discharge, skin change or tenderness  HENT:      Head: Normocephalic  Nose: Nose normal    Pulmonary:      Effort: Pulmonary effort is normal    Chest:      Chest wall: No mass, lacerations, deformity, swelling, tenderness, crepitus or edema  Abdominal:      General: There is no distension  Palpations: Abdomen is soft  Tenderness: There is no abdominal tenderness  Lymphadenopathy:      Upper Body:      Right upper body: No supraclavicular or axillary adenopathy  Left upper body: No supraclavicular or axillary adenopathy  Neurological:      General: No focal deficit present  Mental Status: She is alert and oriented to person, place, and time  Skin:     General: Skin is warm and dry     Psychiatric:         Mood and Affect: Mood normal          Behavior: Behavior normal          Thought Content: Thought content normal    Vitals reviewed               Future Appointments   Date Time Provider Hemant Reyes   12/27/2022  8:30 AM [facility]   1/19/2023  5:20 PM CLARENCE Weeks [facility]   2/6/2023  3:20 PM CLARENCE Weeks

## 2022-12-22 NOTE — PATIENT INSTRUCTIONS
Wellness Visit for Adults   AMBULATORY CARE:   A wellness visit  is when you see your healthcare provider to get screened for health problems  Your healthcare provider will also give you advice on how to stay healthy  Write down your questions so you remember to ask them  Ask your healthcare provider how often you should have a wellness visit  What happens at a wellness visit:  Your healthcare provider will ask about your health, and your family history of health problems  This includes high blood pressure, heart disease, and cancer  He or she will ask if you have symptoms that concern you, if you smoke, and about your mood  You may also be asked about your intake of medicines, supplements, food, and alcohol  Any of the following may be done: Your weight  will be checked  Your height may also be checked so your body mass index (BMI) can be calculated  Your BMI shows if you are at a healthy weight  Your blood pressure  and heart rate will be checked  Your temperature may also be checked  Blood and urine tests  may be done  Blood tests may be done to check your cholesterol levels  Abnormal cholesterol levels increase your risk for heart disease and stroke  You may also need a blood or urine test to check for diabetes if you are at increased risk  Urine tests may be done to look for signs of an infection or kidney disease  A physical exam  includes checking your heartbeat and lungs with a stethoscope  Your healthcare provider may also check your skin to look for sun damage  Screening tests  may be recommended  A screening test is done to check for diseases that may not cause symptoms  The screening tests you may need depend on your age, gender, family history, and lifestyle habits  For example, colorectal screening may be recommended if you are 48years old or older  Screening tests you need if you are a woman:   A Pap smear  is used to screen for cervical cancer   Pap smears are usually done every 3 to 5 years depending on your age  You may need them more often if you have had abnormal Pap smear test results in the past  Ask your healthcare provider how often you should have a Pap smear  A mammogram  is an x-ray of your breasts to screen for breast cancer  Experts recommend mammograms every 2 years starting at age 48 years  You may need a mammogram at age 52 years or younger if you have an increased risk for breast cancer  Talk to your healthcare provider about when you should start having mammograms and how often you need them  Vaccines you may need:   Get an influenza vaccine  every year  The influenza vaccine protects you from the flu  Several types of viruses cause the flu  The viruses change over time, so new vaccines are made each year  Get a tetanus-diphtheria (Td) booster vaccine  every 10 years  This vaccine protects you against tetanus and diphtheria  Tetanus is a severe infection that may cause painful muscle spasms and lockjaw  Diphtheria is a severe bacterial infection that causes a thick covering in the back of your mouth and throat  Get a human papillomavirus (HPV) vaccine  if you are female and aged 23 to 32 or male 23 to 24 and never received it  This vaccine protects you from HPV infection  HPV is the most common infection spread by sexual contact  HPV may also cause vaginal, penile, and anal cancers  Get a pneumococcal vaccine  if you are aged 72 years or older  The pneumococcal vaccine is an injection given to protect you from pneumococcal disease  Pneumococcal disease is an infection caused by pneumococcal bacteria  The infection may cause pneumonia, meningitis, or an ear infection  Get a shingles vaccine  if you are 60 or older, even if you have had shingles before  The shingles vaccine is an injection to protect you from the varicella-zoster virus  This is the same virus that causes chickenpox   Shingles is a painful rash that develops in people who had chickenpox or have been exposed to the virus  How to eat healthy:  My Plate is a model for planning healthy meals  It shows the types and amounts of foods that should go on your plate  Fruits and vegetables make up about half of your plate, and grains and protein make up the other half  A serving of dairy is included on the side of your plate  The amount of calories and serving sizes you need depends on your age, gender, weight, and height  Examples of healthy foods are listed below:  Eat a variety of vegetables  such as dark green, red, and orange vegetables  You can also include canned vegetables low in sodium (salt) and frozen vegetables without added butter or sauces  Eat a variety of fresh fruits , canned fruit in 100% juice, frozen fruit, and dried fruit  Include whole grains  At least half of the grains you eat should be whole grains  Examples include whole-wheat bread, wheat pasta, brown rice, and whole-grain cereals such as oatmeal     Eat a variety of protein foods such as seafood (fish and shellfish), lean meat, and poultry without skin (turkey and chicken)  Examples of lean meats include pork leg, shoulder, or tenderloin, and beef round, sirloin, tenderloin, and extra lean ground beef  Other protein foods include eggs and egg substitutes, beans, peas, soy products, nuts, and seeds  Choose low-fat dairy products such as skim or 1% milk or low-fat yogurt, cheese, and cottage cheese  Limit unhealthy fats  such as butter, hard margarine, and shortening  Exercise:  Exercise at least 30 minutes per day on most days of the week  Some examples of exercise include walking, biking, dancing, and swimming  You can also fit in more physical activity by taking the stairs instead of the elevator or parking farther away from stores  Include muscle strengthening activities 2 days each week  Regular exercise provides many health benefits   It helps you manage your weight, and decreases your risk for type 2 diabetes, heart disease, stroke, and high blood pressure  Exercise can also help improve your mood  Ask your healthcare provider about the best exercise plan for you  General health and safety guidelines:   Do not smoke  Nicotine and other chemicals in cigarettes and cigars can cause lung damage  Ask your healthcare provider for information if you currently smoke and need help to quit  E-cigarettes or smokeless tobacco still contain nicotine  Talk to your healthcare provider before you use these products  Limit alcohol  A drink of alcohol is 12 ounces of beer, 5 ounces of wine, or 1½ ounces of liquor  Lose weight, if needed  Being overweight increases your risk of certain health conditions  These include heart disease, high blood pressure, type 2 diabetes, and certain types of cancer  Protect your skin  Do not sunbathe or use tanning beds  Use sunscreen with a SPF 15 or higher  Apply sunscreen at least 15 minutes before you go outside  Reapply sunscreen every 2 hours  Wear protective clothing, hats, and sunglasses when you are outside  Drive safely  Always wear your seatbelt  Make sure everyone in your car wears a seatbelt  A seatbelt can save your life if you are in an accident  Do not use your cell phone when you are driving  This could distract you and cause an accident  Pull over if you need to make a call or send a text message  Practice safe sex  Use latex condoms if are sexually active and have more than one partner  Your healthcare provider may recommend screening tests for sexually transmitted infections (STIs)  Wear helmets, lifejackets, and protective gear  Always wear a helmet when you ride a bike or motorcycle, go skiing, or play sports that could cause a head injury  Wear protective equipment when you play sports  Wear a lifejacket when you are on a boat or doing water sports      © Copyright [employer] 2022 Information is for End User's use only and may not be sold, redistributed or otherwise used for commercial purposes  All illustrations and images included in CareNotes® are the copyrighted property of A D A M [employer]  or Sunil Hernandez  The above information is an  only  It is not intended as medical advice for individual conditions or treatments  Talk to your doctor, nurse or pharmacist before following any medical regimen to see if it is safe and effective for you

## 2022-12-27 ENCOUNTER — HOSPITAL ENCOUNTER (OUTPATIENT)
Dept: RADIOLOGY | Facility: CLINIC | Age: 32
Discharge: HOME | End: 2022-12-27

## 2022-12-27 VITALS — BODY MASS INDEX: 41.22 KG/M2 | WEIGHT: 224 LBS | HEIGHT: 62 IN

## 2022-12-27 DIAGNOSIS — Z12.39 BREAST CANCER SCREENING, HIGH RISK PATIENT: ICD-10-CM

## 2022-12-27 DIAGNOSIS — Z80.3 FAMILY HISTORY OF BREAST CANCER: ICD-10-CM

## 2022-12-27 DIAGNOSIS — Z12.31 ENCOUNTER FOR SCREENING MAMMOGRAM FOR BREAST CANCER: ICD-10-CM

## 2022-12-28 ENCOUNTER — TELEPHONE (OUTPATIENT)
Dept: GENETICS | Facility: CLINIC | Age: 32
End: 2022-12-28

## 2022-12-28 NOTE — PROGRESS NOTES
Name: Lyubov Rice      : 1990      MRN: [id number]  Encounter Provider: CLARENCE Burns  Encounter Date: 2022   Encounter department: [facility]    Assessment & Plan     Neuro and ENT exam benign, no "red flag" s/s noted  Most likely dx at this time is right post-infectious labyrinthitis  Prescribed patient meclizine to help with vertigo sensation  Also provided patient with a medrol pack from our office supply to treat underlying inflammation  Encouraged patient to complete abx ear drops that were prescribed from urgent care on 22, patient completed Augmentin regimen prescribed earlier this month  No further antibiotics are warranted at this time  Advised patient to contact our office if her condition fails to improve with these treatments  Discussed what signs and symptoms warrant emergent medical care  Patient verbalized understanding  1  Labyrinthitis of right ear  -     methylPREDNISolone 4 MG tablet therapy pack; Use as directed on package    2  Vertigo  -     meclizine (ANTIVERT) 25 mg tablet; Take 1 tablet (25 mg total) by mouth 3 (three) times a day as needed for dizziness    3  Otalgia, unspecified laterality         Subjective      Subjective    Lyubov Rice is a 34 y o  female who presents for evaluation of dizziness  The symptoms started a few days ago and are unchanged  The attacks occur daily and last 15 seconds  Positions that worsen symptoms: laying to standing  Previous workup/treatments: ofloxacin ear drops on 22 for ear infection  Associated ear symptoms: aural pressure  Associated CNS symptoms: none  Recent infections: ear infection  Head trauma: denied  Drug ingestion: none  Noise exposure: no occupational exposure  Family history: non-contributory  - Quality (spinning):        Review of Systems   Constitutional: Negative  Negative for fatigue and fever     HENT: Positive for ear pain  R ear pain   Eyes: Negative  Respiratory: Negative  Cardiovascular: Negative  Gastrointestinal: Negative  Endocrine: Negative  Genitourinary: Negative  Musculoskeletal: Negative  Skin: Negative  Allergic/Immunologic: Negative  Neurological: Positive for dizziness  Hematological: Negative  Psychiatric/Behavioral: Negative  Current Outpatient Medications on File Prior to Visit   Medication Sig   • escitalopram (LEXAPRO) 20 mg tablet Take 20 mg by mouth   • LORazepam (ATIVAN) 0 5 mg tablet Take 0 5 mg by mouth as needed   • medroxyPROGESTERone (DEPO-PROVERA) 150 mg/mL injection INJECT 150 MG IM ONCE,INSTR:BRING TO OFFICE FOR ADMINISTRATION   • oxybutynin (DITROPAN) 5 mg tablet Take 5 mg by mouth   • traZODone (DESYREL) 50 mg tablet Take 50 mg by mouth   • [] amoxicillin-clavulanate (Augmentin) 875-125 mg per tablet Take 1 tablet by mouth every 12 (twelve) hours for 7 days   • ofloxacin (FLOXIN) 0 3 % otic solution 5 DROP(S) EAR-RIGHT TWICE A DAY ,X10 DAY(S)       Objective     /78 (BP Location: Left arm, Patient Position: Sitting)   Pulse 104   Temp 98 9 °F (37 2 °C)   Ht 5' 2" (1 575 m)   Wt 102 kg (225 lb)   SpO2 97%   BMI 41 15 kg/m²     Physical Exam  Constitutional:       General: She is not in acute distress  Appearance: Normal appearance  She is ill-appearing  She is not toxic-appearing or diaphoretic  HENT:      Right Ear: Tympanic membrane normal       Left Ear: Tympanic membrane normal       Nose: Nose normal       Mouth/Throat:      Mouth: Mucous membranes are moist       Pharynx: Oropharynx is clear  No oropharyngeal exudate or posterior oropharyngeal erythema  Eyes:      General: No visual field deficit  Extraocular Movements: Extraocular movements intact  Conjunctiva/sclera: Conjunctivae normal       Pupils: Pupils are equal, round, and reactive to light     Cardiovascular:      Rate and Rhythm: Normal rate and regular rhythm  Pulses: Normal pulses  Heart sounds: Normal heart sounds  Pulmonary:      Effort: Pulmonary effort is normal  No respiratory distress  Breath sounds: Normal breath sounds  No wheezing  Abdominal:      General: Abdomen is flat  Musculoskeletal:         General: No swelling or deformity  Normal range of motion  Cervical back: Normal range of motion  Skin:     Findings: No erythema or rash  Neurological:      General: No focal deficit present  Mental Status: She is alert and oriented to person, place, and time  Cranial Nerves: Cranial nerves 2-12 are intact  No cranial nerve deficit, dysarthria or facial asymmetry  Sensory: Sensation is intact  Motor: Motor function is intact  Coordination: Romberg sign negative   Coordination normal  Finger-Nose-Finger Test and Heel to Vedia Fitch Test normal  Rapid alternating movements normal       Gait: Gait and tandem walk normal    Psychiatric:         Mood and Affect: Mood normal          Behavior: Behavior normal        Fabi Severe, CRNP

## 2022-12-28 NOTE — TELEPHONE ENCOUNTER
I called Rachael to schedule a new patient appointment with the Cancer [facility] Genetics Program       Outcome:  Genetics appointment scheduled for May 4, 2023  Personal/Family History Related to Appointment:  Patient no personal hx of cancer  Family hx of thyroid cancer, breast cancer  Patient is non-Yazidism  History of Genetic Testing:  Patient reports family history of genetic testing  Paternal aunts (3) and paternal cousins (2) chek2+    Patient will email copy of lisha's results to [email or web address]     Genetics Family History Questionnaire:  I confirmed the patient's e-mail on file as the best e-mail to send an invite link for our genetics family history intake

## 2022-12-29 ENCOUNTER — OFFICE VISIT (OUTPATIENT)
Dept: FAMILY MEDICINE CLINIC | Facility: CLINIC | Age: 32
End: 2022-12-29

## 2022-12-29 VITALS
DIASTOLIC BLOOD PRESSURE: 78 MMHG | HEART RATE: 104 BPM | BODY MASS INDEX: 41.41 KG/M2 | TEMPERATURE: 98.9 F | OXYGEN SATURATION: 97 % | WEIGHT: 225 LBS | SYSTOLIC BLOOD PRESSURE: 122 MMHG | HEIGHT: 62 IN

## 2022-12-29 DIAGNOSIS — R42 VERTIGO: ICD-10-CM

## 2022-12-29 DIAGNOSIS — H83.01 LABYRINTHITIS OF RIGHT EAR: Primary | ICD-10-CM

## 2022-12-29 DIAGNOSIS — H92.09 OTALGIA, UNSPECIFIED LATERALITY: ICD-10-CM

## 2022-12-29 RX ORDER — MECLIZINE HYDROCHLORIDE 25 MG/1
25 TABLET ORAL 3 TIMES DAILY PRN
Qty: 30 TABLET | Refills: 0 | Status: SHIPPED | OUTPATIENT
Start: 2022-12-29

## 2022-12-29 RX ORDER — OFLOXACIN 3 MG/ML
SOLUTION AURICULAR (OTIC)
COMMUNITY
Start: 2022-12-24

## 2022-12-29 RX ORDER — METHYLPREDNISOLONE 4 MG/1
TABLET ORAL
Qty: 21 EACH | Refills: 0
Start: 2022-12-29 | End: 2023-01-06 | Stop reason: ALTCHOICE

## 2022-12-29 NOTE — PATIENT INSTRUCTIONS
Labyrinthitis   AMBULATORY CARE:   Labyrinthitis  is an inflammation of the labyrinth or nerves in the inner ear  The labyrinth helps you hear and keep your balance  Symptoms can make it difficult to walk or do your normal activities but are not life-threatening  Signs and symptoms of labyrinthitis  are often sudden and severe  You may have any of the following:  Vertigo (a feeling of moving or spinning, or being pulled to the floor or toward your side)    Nausea and vomiting    Abnormal fast movement of the eyes    Pale, cold, or sweaty skin    Tinnitus (ringing or buzzing in the ears) or hearing loss    Call your local emergency number (911 in the [address] [facility]) or have someone call if:   You have trouble speaking or thinking clearly  You have vision changes or shortness of breath  Seek care immediately if:   You are not able to keep any fluids, food, or medicine down without vomiting  You have a dry mouth or cracked lips  You have a headache, stiff neck, and a fever  Your heartbeat is fast or irregular  You are not able to urinate  You have blood, pus, or fluid coming out of your ears  You have dizzy spells that last longer than they usually do  You have any weak or numb areas  Call your doctor if:   You have a fever  You have ear pain  You feel weak, tired, or lose weight without trying  Your symptoms keep coming back or get worse  You have questions or concerns about your condition or care  Manage labyrinthitis:   Do the following when you have signs and symptoms of labyrinthitis:      Be calm and take slow, deep breaths  Sit or lie down right away when you feel dizzy  Keep your head as still as possible and do not change positions quickly  Move slowly and let yourself get used to one position before moving to another position  Do not walk without help, drive a car, or operate heavy machinery when you feel dizzy  Make your home safe to prevent falls    Use a 4-pronged cane or walker to help you keep your balance when you walk  Remove loose carpeting from the floor to reduce your risk for a fall  Use chairs with side arms and hard cushions to make it easier to get up or out of a chair  Put grab bars on the walls beside toilets and inside showers and bathtubs  These will help you get up and help prevent falls  You may want to put a shower chair inside the shower  Use vestibular and balance rehabilitation therapy (VBRT), if directed  Therapy may be done with a physical therapist or at home  VBRT includes movement exercises while you are sitting or standing  These exercises will make you dizzy, but can also help your brain adapt to the triggers that are causing your vertigo  Over time, this therapy may help you have vertigo less often and also improve your balance  For more information:   [employer]   P [address]  [address] [facility] , [address] [place] [address]  Phone: [phone]  Phone: [phone]    Follow up with your doctor as directed:  Write down your questions so you remember to ask them during your visits  © Copyright [employer] 2022 Information is for End User's use only and may not be sold, redistributed or otherwise used for commercial purposes  All illustrations and images included in CareNotes® are the copyrighted property of [employer]  or Sunil Hernandez  The above information is an  only  It is not intended as medical advice for individual conditions or treatments  Talk to your doctor, nurse or pharmacist before following any medical regimen to see if it is safe and effective for you

## 2023-01-02 ENCOUNTER — DOCUMENTATION (OUTPATIENT)
Dept: OBGYN CLINIC | Facility: CLINIC | Age: 33
End: 2023-01-02

## 2023-01-02 DIAGNOSIS — Z12.39 BREAST CANCER SCREENING, HIGH RISK PATIENT: Primary | ICD-10-CM

## 2023-01-02 DIAGNOSIS — Z91.89 INCREASED RISK OF BREAST CANCER: ICD-10-CM

## 2023-01-02 DIAGNOSIS — Z80.3 FAMILY HISTORY OF BREAST CANCER: ICD-10-CM

## 2023-01-05 ENCOUNTER — TELEPHONE (OUTPATIENT)
Dept: FAMILY MEDICINE CLINIC | Facility: CLINIC | Age: 33
End: 2023-01-05

## 2023-01-05 NOTE — TELEPHONE ENCOUNTER
Patient called and expressed she isn't feeling much better since her appointment with you   Please advise

## 2023-01-05 NOTE — PROGRESS NOTES
Assessment/Plan:    No problem-specific Assessment & Plan notes found for this encounter  Diagnoses and all orders for this visit:    Labyrinthitis of right ear  -     predniSONE 10 mg tablet; Take 5 tablets (50 mg total) by mouth daily for 5 days, THEN 4 tablets (40 mg total) daily for 1 day, THEN 3 tablets (30 mg total) daily for 1 day, THEN 2 tablets (20 mg total) daily for 1 day, THEN 1 tablet (10 mg total) daily for 1 day, THEN 0 5 tablets (5 mg total) daily for 1 day  Dysfunction of right eustachian tube    Pressure sensation in right ear  -     predniSONE 10 mg tablet; Take 5 tablets (50 mg total) by mouth daily for 5 days, THEN 4 tablets (40 mg total) daily for 1 day, THEN 3 tablets (30 mg total) daily for 1 day, THEN 2 tablets (20 mg total) daily for 1 day, THEN 1 tablet (10 mg total) daily for 1 day, THEN 0 5 tablets (5 mg total) daily for 1 day  -     Ambulatory Referral to Otolaryngology; Future    Right ear pain  -     predniSONE 10 mg tablet; Take 5 tablets (50 mg total) by mouth daily for 5 days, THEN 4 tablets (40 mg total) daily for 1 day, THEN 3 tablets (30 mg total) daily for 1 day, THEN 2 tablets (20 mg total) daily for 1 day, THEN 1 tablet (10 mg total) daily for 1 day, THEN 0 5 tablets (5 mg total) daily for 1 day  -     Ambulatory Referral to Otolaryngology; Future    Vertigo  -     predniSONE 10 mg tablet; Take 5 tablets (50 mg total) by mouth daily for 5 days, THEN 4 tablets (40 mg total) daily for 1 day, THEN 3 tablets (30 mg total) daily for 1 day, THEN 2 tablets (20 mg total) daily for 1 day, THEN 1 tablet (10 mg total) daily for 1 day, THEN 0 5 tablets (5 mg total) daily for 1 day  -     Ambulatory Referral to Otolaryngology; Future          Patient is a 69-year-old female that presents with improvement in her vertigo symptoms today  She states that her episodes have been getting less frequent and less severe in nature    She continues to have this persistent right sided ear pressure which started 12/23/2022  She had an ear infection and was initially on antibiotics and then drops  She was seen last week when the vertigo started and her top differential was acute labyrinthitis  I agree with this picture and there is a component of eustachian tube dysfunction as well either from her recent bacterial infection or the virus that she had right before [place]  Patient was on a Medrol Dosepak last week which did help improve some of her vertigo symptoms  We discussed options at this time and I feel that the patient may benefit from a steroid taper at a higher dose  I did advise that pressure can sometimes persist for 3 weeks after recent infection  We have agreed to start a prednisone taper to try and speed of her recovery time  She does continue to describe some congestion and pressure around her nose as well  I will place a referral to ENT for hearing testing and nasal scope to look for polyps  Recommended follow-up if symptoms or not improving after treatment or if dizziness starts to get worse again    Subjective:      Patient ID: Sruthi Mata is a 34 y o  female  HPI    The following portions of the patient's history were reviewed and updated as appropriate: allergies, current medications, past family history, past medical history, past social history, past surgical history and problem list     Patient seen 12/29/22 with Syd Ibrahim: Sruthi Mata is a 34 y o  female who presents for evaluation of dizziness  The symptoms started a few days ago and are unchanged  The attacks occur daily and last 15 seconds  Positions that worsen symptoms: laying to standing  Previous workup/treatments: ofloxacin ear drops on 12/24/22 for ear infection  Associated ear symptoms: aural pressure  Associated CNS symptoms: none  Recent infections: ear infection  Head trauma: denied  Drug ingestion: none  Noise exposure: no occupational exposure  Family history: non-contributory      Patient presents today after treatment with meclizine and steroid therapy pack  She states that she has had improvement in her vertigo symptoms at this time  They still occur intermittently last for several seconds at a time and are mainly positional when going from lying down to standing  Patient denies any problems with coordination, walking, balance  Continues to have persistent right-sided ear pressure pain feeling  States that it is near constant  She does report intermittent intervals that lasts seconds to minutes sometimes where she feels a bit of a popping sensation and a relief of pressure  She denies no improvement when eating or doing activities where she repeatedly opens her jaw  No reported changes in hearing  She does report some continued congestion that she has been having  Review of Systems   HENT: Positive for congestion and ear pain  Negative for ear discharge and hearing loss  Eyes: Negative for visual disturbance  Respiratory: Negative for cough and shortness of breath  Cardiovascular: Negative for chest pain  Neurological: Positive for headaches  Intermittent vertigo         Objective:      /82 (BP Location: Right arm, Patient Position: Sitting, Cuff Size: Large)   Pulse (!) 115   Temp 98 2 °F (36 8 °C)   Ht 5' 2" (1 575 m)   Wt 102 kg (225 lb)   SpO2 96%   BMI 41 15 kg/m²          Physical Exam  Vitals and nursing note reviewed  Constitutional:       General: She is not in acute distress  Appearance: Normal appearance  She is obese  She is not ill-appearing  HENT:      Head: Normocephalic and atraumatic  Right Ear: Ear canal and external ear normal  No decreased hearing noted  No drainage  A middle ear effusion is present  There is no impacted cerumen  Tympanic membrane is not erythematous  Left Ear: Tympanic membrane, ear canal and external ear normal  No decreased hearing noted  There is no impacted cerumen   Tympanic membrane is not erythematous  Ears:      Comments: On examination there is some noted minor swelling to the right ear canal   No signs of infection present behind the tympanic membrane  TM is dull and gray in appearance with noted fluid behind the ear  Exam is otherwise unremarkable  Nose: Congestion present  Mouth/Throat:      Mouth: Mucous membranes are moist       Pharynx: Oropharynx is clear  No oropharyngeal exudate or posterior oropharyngeal erythema  Eyes:      Extraocular Movements: Extraocular movements intact  Conjunctiva/sclera: Conjunctivae normal       Pupils: Pupils are equal, round, and reactive to light  Cardiovascular:      Rate and Rhythm: Regular rhythm  Tachycardia present  Heart sounds: Normal heart sounds  No murmur heard  No friction rub  No gallop  Pulmonary:      Effort: Pulmonary effort is normal  No respiratory distress  Breath sounds: Normal breath sounds  No wheezing  Musculoskeletal:         General: Normal range of motion  Cervical back: Normal range of motion and neck supple  No tenderness  Lymphadenopathy:      Cervical: No cervical adenopathy  Skin:     General: Skin is warm and dry  Findings: No erythema or rash  Neurological:      General: No focal deficit present  Mental Status: She is alert and oriented to person, place, and time  Mental status is at baseline  Sensory: No sensory deficit  Coordination: Coordination normal       Gait: Gait normal       Comments: Cranial nerves grossly intact, no difficulties with gait or coordination while walking   Psychiatric:         Mood and Affect: Mood normal          Behavior: Behavior normal          Thought Content:  Thought content normal          Judgment: Judgment normal

## 2023-01-06 ENCOUNTER — OFFICE VISIT (OUTPATIENT)
Dept: FAMILY MEDICINE CLINIC | Facility: CLINIC | Age: 33
End: 2023-01-06

## 2023-01-06 VITALS
DIASTOLIC BLOOD PRESSURE: 82 MMHG | SYSTOLIC BLOOD PRESSURE: 104 MMHG | TEMPERATURE: 98.2 F | HEIGHT: 62 IN | BODY MASS INDEX: 41.41 KG/M2 | HEART RATE: 115 BPM | WEIGHT: 225 LBS | OXYGEN SATURATION: 96 %

## 2023-01-06 DIAGNOSIS — H69.81 DYSFUNCTION OF RIGHT EUSTACHIAN TUBE: ICD-10-CM

## 2023-01-06 DIAGNOSIS — R42 VERTIGO: ICD-10-CM

## 2023-01-06 DIAGNOSIS — H83.01 LABYRINTHITIS OF RIGHT EAR: Primary | ICD-10-CM

## 2023-01-06 DIAGNOSIS — H93.8X1 PRESSURE SENSATION IN RIGHT EAR: ICD-10-CM

## 2023-01-06 DIAGNOSIS — H92.01 RIGHT EAR PAIN: ICD-10-CM

## 2023-01-06 RX ORDER — PREDNISONE 10 MG/1
TABLET ORAL
Qty: 36 TABLET | Refills: 0 | Status: SHIPPED | OUTPATIENT
Start: 2023-01-06 | End: 2023-01-16

## 2023-01-12 ENCOUNTER — RA CDI HCC (OUTPATIENT)
Dept: OTHER | Facility: HOSPITAL | Age: 33
End: 2023-01-12

## 2023-01-12 ENCOUNTER — TELEPHONE (OUTPATIENT)
Dept: ADMINISTRATIVE | Facility: OTHER | Age: 33
End: 2023-01-12

## 2023-01-12 NOTE — PROGRESS NOTES
[facility] coding opportunities          Chart Reviewed number of suggestions sent to Provider: 2     Patients Insurance        Commercial Insurance: [address] [place] [address]

## 2023-01-12 NOTE — TELEPHONE ENCOUNTER
Upon review of the request/inquiry, we are reaching out to you to ask for assistance  We have reviewed all Chart Review tabs, Care Everywhere (CE), completed a chart search and were unable to locate requested item(s)  If you feel this was an oversight, please respond with with location and date of service of the document(s)  To respond with requested information, open this Encounter, navigate to the Routing section, add your response to the routing comments, add my name to the Recipient field, and select Send and Close Workspace      Thank you  Kasi López

## 2023-01-12 NOTE — TELEPHONE ENCOUNTER
----- Message from Collin Almaguer sent at 1/12/2023 10:08 AM EST -----  Regarding: care gap request  01/04/23 1:48 PM    Hello, our patient has had Pap Smear (HPV) aka Cervical Cancer Screening completed/performed  Please assist in updating the patient chart by pulling the Care Everywhere (CE) document  The date of service is 10/23/2010       Thank you,  Collin FREEDMAN  [address] [facility]

## 2023-01-13 ENCOUNTER — TELEPHONE (OUTPATIENT)
Dept: FAMILY MEDICINE CLINIC | Facility: CLINIC | Age: 33
End: 2023-01-13

## 2023-01-13 ENCOUNTER — TELEPHONE (OUTPATIENT)
Dept: HEMATOLOGY ONCOLOGY | Facility: CLINIC | Age: 33
End: 2023-01-13

## 2023-01-13 NOTE — TELEPHONE ENCOUNTER
I already placed an ENT referral for her  Would you give her the contact information if you can pull it up?

## 2023-01-13 NOTE — TELEPHONE ENCOUNTER
Patient called and said she saw you last week for ear pain and now her left ear is bothering her   She is wondering if you could place an ENT referral for her or what you would recommend

## 2023-01-16 NOTE — PROGRESS NOTES
Pre-Test Genetic Counseling Consult Note    Patient Name: Tom RODRIGUEZ/Age: 1990/32 y o  Referring Provider: David Nickerson MD    Date of Service: 2023  Genetic Counselor: Orville Sylvester MS, [facility]  Interpretation Services: None  [facility]      Berhane Fraser was referred to the [address] [place] [address] and Genetic Assessment Program due to her familial CHEK2 mutation  she presents today to discuss the possibility of a hereditary cancer syndrome, options for genetic testing, and implications for her and her family  Cancer History and Treatment:   Personal History: no personal history of cancer    Screening Hx:   Breast:  Breast Imaging: Mammogram (2022): IMPRESSION:  No mammographic evidence of malignancy  Breast Biopsy: none   Breast Density: Scattered fibroglandular density     Colon:  Colonoscopy: none     Gynecologic:  Ovaries and Uterus intact     Skin:  No current screening    Other screening: none     Reproductive History  Age at menarche: 7  Age at first live birth: Nulliparous  Menopause: Premenopausal  Hormone replacement: none     Medical and Surgical History  Pertinent surgical history:   Past Surgical History:   Procedure Laterality Date   • SHOULDER ARTHROSCOPY Left    • SHOULDER SURGERY     • WISDOM TOOTH EXTRACTION     • WISDOM TOOTH EXTRACTION        Pertinent medical history:  Past Medical History:   Diagnosis Date   • Allergic within the last 3 years    [place], [place], seasonal   • Anxiety    • Arthritis    • Depression    • Migraines          Other History:  Height:   Ht Readings from Last 1 Encounters:   23 5' 2" (1 575 m)     Weight:   Wt Readings from Last 1 Encounters:   23 102 kg (225 lb)       Relevant Family History   Patient reports non-Ashkenazi Yarsani ancestry       Maternal Family History:  Great aunt (grandfather's side): breast cancer, no information  Nisa Smooth uncle (grandfather's side): colon cancer, no information  Great grandmother (grandfather's side): ovarian cancer, no information  Nisa Smooth aunt (grandmother's side): leukemia, no information  Nisa Smooth aunt (grandmother's side): rectal cancer, no information   Nisa Smooth grandmother (grandmother's side): ovarian cancer, no information     Paternal Family History:  Father: thyroid cancer diagnosed at 39, smoker (currently 61 y/o)   Aunt: thyroid cancer diagnosed at 43 (currently 48 y/o)   Grandmother: pancreatic cancer diagnosed at 61 (d 81)   Half-aunt: breast cancer diagnosed at 52 (currently 61 y/o)   First-cousin once removed (female): breast cancer diagnosed at 46; CHEK2 pathogenic variant, specifically c 277del p  TrpGlyfs*17)  First-cousin once removed (female):  CHEK2 pathogenic variant, specifically c 277del p  TrpGlyfs*17)  First-cousin once removed (female): breast cancer diagnosed at 48; CHEK2 pathogenic variant (currently 61 y/o)   First-cousin once removed (female): breast cancer diagnosed at 46; CHEK2 pathogenic variant (d 58)         Please refer to the scanned pedigree in the Media Tab for a complete family history     *All history is reported as provided by the patient; records are not available for review, except where indicated  Assessment:  We discussed sporadic, familial and hereditary cancer  We also discussed the many factors that influence our risk for cancer such as age, environmental exposures, lifestyle choices and family history  Genetic testing is indicated for [facility] based on the following criteria: Meets NCCN V2 2023 General Testing Criteria (Located on page CRIT-1 in the Genetic/Familial High Risk Assessment: Breast, Ovarian and Pancreatic Guideline): blood relative (firs-cousin once removed) with a pathogenic variant in a cancer susceptibility gene (CHEK2 c 277del p  TrpGlyfs*17)  We explained that the likelihood that [facility] harbors the same [place] pathogneic variant identified in her relative is ~6%       We reviewed the cancer risks associated with the CHEK2 pathogenic variant, as well as the NCCN screening recommendations and risk-reducing options available if Rachael tests positive for the familial variant  Apolinar Downey was given the option to test for the familial CHEK2 pathogenic variant only or to test using a larger hereditary caner panel  We reviewed the indications suggestive of a hereditary predisposition to cancer  It is suspected that the CHEK2 pathogenic variant was inherited from her paternal grandfather's side  Given the family history of pancreatic cancer diagnosed in her maternal grandmother and the colon cancer, breast cancer, ovarian cancer, and rectal cancer on her maternal side, Rachael elected to pursue testing with a larger panel to rule out an additional hereditary cancer syndrome  The risks, benefits, and limitations of genetic testing were reviewed with the patient, as well as genetic discrimination laws, and possible test results (positive, negative, variants of uncertain significance) and their clinical implications  If positive for a mutation, options for managing cancer risk including increased surveillance, chemoprevention, and in some cases prophylactic surgery were discussed  Apolinar Downey was informed that if a hereditary cancer syndrome was identified in her, first degree relatives (parents, siblings, and children) have a chance of also inheriting the condition  Genetic testing would allow for predictive genetic testing in other relatives, who may also be at risk depending on their degree of relation  Billing:  Most individuals pay <$100 for hereditary cancer genetic testing  If insurance covers the cost of the testing, individuals may still pay out of pocket secondary to co-pays, co-insurance, or deductibles  If the cost of the testing exceeds $100, the lab will reach out to the patient via phone or e-mail   The patient will then have the option to proceed with the testing, cancel the testing, or elect the self-pay option of $250  Truman Marquez verbalized understanding  Plan: Patient decided to proceed with testing and provided consent  Summary:     Sample Collection:  The patient's blood sample was drawn in the office on 1/17/23 by medical assistant Javier Pa    Genetic Testing Preformed: [employer] Common Hereditary Cancers Panel + RNA (52 genes): APC, CHRISTINE, AXIN2, BARD1, BMPR1A, BRCA1, BRCA2, BRIP1, CDH1, CDK4, CDKN2A, CHEK2, CTNNA1, DICER1, EPCAM, GREM1, HOXB13, KIT, MEN1, MLH1, MSH2, MSH3, MSH6, MUTYH, NBN, NF1, NTHL1, PALB2, PDGFRA, PMS2, POLD1, POLE, PTEN, RAD50, RAD51C, RAD51D, SDHA, SDHB, SDHC, SDHD, SMAD4, SMARCA4, STK11, TP53, TSC1, TSC2, VHL    Result Call Information:  I confirmed the patient's mobile number on file as the best number to call with results  I confirmed with the patient that we can leave a voicemail on their mobile number    Results take approximately 2-3 weeks to complete once test is started  We will contact Rachael once results are available  Additional recommendations for surveillance/medical management will be made pending genetic test results

## 2023-01-17 ENCOUNTER — DOCUMENTATION (OUTPATIENT)
Dept: GENETICS | Facility: CLINIC | Age: 33
End: 2023-01-17

## 2023-01-17 ENCOUNTER — CLINICAL SUPPORT (OUTPATIENT)
Dept: GENETICS | Facility: CLINIC | Age: 33
End: 2023-01-17

## 2023-01-17 DIAGNOSIS — Z84.81 FAMILY HISTORY OF GENETIC DISEASE CARRIER: Primary | ICD-10-CM

## 2023-01-17 NOTE — LETTER
2023     Priscilla James, [address] [place] [address]    Patient: Vin Bentley  YOB: 1990  Date of Visit: 2023      Dear Dr Edie Galeano: Thank you for referring Etelvina Rosado to me for evaluation  Below are my notes for this consultation  If you have questions, please do not hesitate to call me  I look forward to following your patient along with you  Sincerely,        Anabell Razo        CC: No Recipients        Pre-Test Genetic Counseling Consult Note    Patient Name: Vin Bentley   /Age: 1990/32 y o  Referring Provider: Priscilla James MD    Date of Service: 2023  Genetic Counselor: Anabell Razo MS, [facility]  Interpretation Services: None  [facility]      Aron Fatima was referred to the [address] [place] [address] and Genetic Assessment Program due to her familial CHEK2 mutation  she presents today to discuss the possibility of a hereditary cancer syndrome, options for genetic testing, and implications for her and her family  Cancer History and Treatment:   Personal History: no personal history of cancer    Screening Hx:   Breast:  Breast Imaging: Mammogram (2022): IMPRESSION:  No mammographic evidence of malignancy    Breast Biopsy: none   Breast Density: Scattered fibroglandular density     Colon:  Colonoscopy: none     Gynecologic:  Ovaries and Uterus intact     Skin:  No current screening    Other screening: none     Reproductive History  Age at menarche: 7  Age at first live birth: Nulliparous  Menopause: Premenopausal  Hormone replacement: none     Medical and Surgical History  Pertinent surgical history:   Past Surgical History:   Procedure Laterality Date   • SHOULDER ARTHROSCOPY Left    • SHOULDER SURGERY     • WISDOM TOOTH EXTRACTION     • WISDOM TOOTH EXTRACTION        Pertinent medical history:  Past Medical History:   Diagnosis Date   • Allergic within the last 3 years    [place], seasonal   • Anxiety 2011   • Arthritis    • Depression    • Migraines          Other History:  Height:   Ht Readings from Last 1 Encounters:   01/06/23 5' 2" (1 575 m)     Weight:   Wt Readings from Last 1 Encounters:   01/06/23 102 kg (225 lb)       Relevant Family History   Patient reports non-Ashkenazi Hindu ancestry  Maternal Family History:  Great aunt (grandfather's side): breast cancer, no information  Great uncle (grandfather's side): colon cancer, no information  Nisa Smooth grandmother (grandfather's side): ovarian cancer, no information  Nisa Smooth aunt (grandmother's side): leukemia, no information  Nisa Smooth aunt (grandmother's side): rectal cancer, no information   Nisa Smooth grandmother (grandmother's side): ovarian cancer, no information     Paternal Family History:  Father: thyroid cancer diagnosed at 39, smoker (currently 61 y/o)   Aunt: thyroid cancer diagnosed at 43 (currently 48 y/o)   Grandmother: pancreatic cancer diagnosed at 61 (d 81)   Half-aunt: breast cancer diagnosed at 52 (currently 61 y/o)   First-cousin once removed (female): breast cancer diagnosed at 46; CHEK2 pathogenic variant, specifically c 277del p  TrpGlyfs*17)  First-cousin once removed (female):  CHEK2 pathogenic variant, specifically c 277del p  TrpGlyfs*17)  First-cousin once removed (female): breast cancer diagnosed at 48; CHEK2 pathogenic variant (currently 61 y/o)   First-cousin once removed (female): breast cancer diagnosed at 46; CHEK2 pathogenic variant (d 58)         Please refer to the scanned pedigree in the Media Tab for a complete family history     *All history is reported as provided by the patient; records are not available for review, except where indicated  Assessment:  We discussed sporadic, familial and hereditary cancer  We also discussed the many factors that influence our risk for cancer such as age, environmental exposures, lifestyle choices and family history  Genetic testing is indicated for [facility] based on the following criteria: Meets NCCN V2 2023 General Testing Criteria (Located on page CRIT-1 in the Genetic/Familial High Risk Assessment: Breast, Ovarian and Pancreatic Guideline): blood relative (firs-cousin once removed) with a pathogenic variant in a cancer susceptibility gene (CHEK2 c 277del p  TrpGlyfs*17)  We explained that the likelihood that [facility] harbors the same [place] pathogneic variant identified in her relative is ~6%  We reviewed the cancer risks associated with the CHEK2 pathogenic variant, as well as the NCCN screening recommendations and risk-reducing options available if Rachael tests positive for the familial variant  [facility] was given the option to test for the familial CHEK2 pathogenic variant only or to test using a larger hereditary caner panel  We reviewed the indications suggestive of a hereditary predisposition to cancer  It is suspected that the CHEK2 pathogenic variant was inherited from her paternal grandfather's side  Given the family history of pancreatic cancer diagnosed in her maternal grandmother and the colon cancer, breast cancer, ovarian cancer, and rectal cancer on her maternal side, Rachael elected to pursue testing with a larger panel to rule out an additional hereditary cancer syndrome  The risks, benefits, and limitations of genetic testing were reviewed with the patient, as well as genetic discrimination laws, and possible test results (positive, negative, variants of uncertain significance) and their clinical implications  If positive for a mutation, options for managing cancer risk including increased surveillance, chemoprevention, and in some cases prophylactic surgery were discussed  [facility] was informed that if a hereditary cancer syndrome was identified in her, first degree relatives (parents, siblings, and children) have a chance of also inheriting the condition   Genetic testing would allow for predictive genetic testing in other relatives, who may also be at risk depending on their degree of relation  Billing:  Most individuals pay <$100 for hereditary cancer genetic testing  If insurance covers the cost of the testing, individuals may still pay out of pocket secondary to co-pays, co-insurance, or deductibles  If the cost of the testing exceeds $100, the lab will reach out to the patient via phone or e-mail  The patient will then have the option to proceed with the testing, cancel the testing, or elect the self-pay option of $250  Edgar Clay verbalized understanding  Plan: Patient decided to proceed with testing and provided consent  Summary:     Sample Collection:  The patient's blood sample was drawn in the office on 1/17/23 by medical assistant Kayley Bustillos    Genetic Testing Preformed: [employer] Common Hereditary Cancers Panel + RNA (52 genes): APC, CHRISTINE, AXIN2, BARD1, BMPR1A, BRCA1, BRCA2, BRIP1, CDH1, CDK4, CDKN2A, CHEK2, CTNNA1, DICER1, EPCAM, GREM1, HOXB13, KIT, MEN1, MLH1, MSH2, MSH3, MSH6, MUTYH, NBN, NF1, NTHL1, PALB2, PDGFRA, PMS2, POLD1, POLE, PTEN, RAD50, RAD51C, RAD51D, SDHA, SDHB, SDHC, SDHD, SMAD4, SMARCA4, STK11, TP53, TSC1, TSC2, VHL    Result Call Information:  I confirmed the patient's mobile number on file as the best number to call with results  I confirmed with the patient that we can leave a voicemail on their mobile number    Results take approximately 2-3 weeks to complete once test is started  We will contact Rachael once results are available  Additional recommendations for surveillance/medical management will be made pending genetic test results

## 2023-01-18 PROBLEM — R42 VERTIGO: Status: ACTIVE | Noted: 2023-01-18

## 2023-01-18 NOTE — PROGRESS NOTES
Name: Yuridia Sanches      : 1990      MRN: [id number]  Encounter Provider: CLARENCE Vázquez  Encounter Date: 2023   Encounter department: [id number] [place] [address]     Follow-up in 3 months to reassess A1c or sooner pending lab work    See [facility] specific charting below    1  Vertigo  Assessment & Plan:  "Getting pops and pains in my ear now and then    dizziness now gone"  Has visit with ENT  in case condition fails to improve  2  Prediabetes  Assessment & Plan:  Point-of-care A1c elevated today at 6 3, which is consistent with a diagnosis of prediabetes  Reviewed A1c norms with the patient and informed her that if aggressive lifestyle changes are not initiated, she would likely develop diabetes in the near future  Patient reports that she believes her elevated A1c is a result of her poor diet over the holiday season  Patient agreed to reestablish with Rika Dumont of our care management team to discuss diet and exercise  Orders:  -     CBC and differential; Future  -     Comprehensive metabolic panel; Future  -     Lipid panel; Future  -     Ambulatory referral to complex care management program; Future    3  Anxiety and depression  Assessment & Plan:  Patient had a recent death in the family and as a result, is requesting a refill of her Ativan  This medication was last refilled in 2022  Will bypass the controlled substances contract and protocol today as the patient is not using Ativan routinely  Advised the patient that if she begins to request refills of this medication routinely, that we will have to schedule a visit to complete the controlled substances protocol  Patient in agreement  Orders:  -     TSH, 3rd generation with Free T4 reflex; Future  -     LORazepam (ATIVAN) 0 5 mg tablet; Take 1 tablet (0 5 mg total) by mouth as needed for anxiety    4   Morbid obesity with BMI of 40 0-44 9, adult ([facility] [address] )  -     CBC and differential; Future  -     Comprehensive metabolic panel; Future  -     Lipid panel; Future  -     TSH, 3rd generation with Free T4 reflex; Future  -     POCT hemoglobin A1c  -     Ambulatory referral to complex care management program; Future    5  Elevated LDL cholesterol level  -     Lipid panel; Future    6  Need for hepatitis C screening test  -     Hepatitis C Ab W/Refl To HCV RNA, Qn, PCR; Future    7  Screening for HIV (human immunodeficiency virus)  -     Human Immunodeficiency Virus 1/2 Antigen / Antibody ( Fourth Generation) with Reflex Testing; Future           Subjective      Routine 6 month f/u to address the following  - assess need for baseline labs  - reassess recent bouts of vertigo  - reassess anxiety/depression    Review of Systems   Constitutional: Negative  Negative for fatigue and fever  HENT: Positive for ear pain  Eyes: Negative  Respiratory: Negative  Cardiovascular: Negative  Gastrointestinal: Negative  Endocrine: Negative  Genitourinary: Negative  Musculoskeletal: Negative  Skin: Negative  Allergic/Immunologic: Negative  Neurological: Negative  Negative for dizziness and headaches  Hematological: Negative  Psychiatric/Behavioral: Positive for sleep disturbance  The patient is nervous/anxious          Current Outpatient Medications on File Prior to Visit   Medication Sig   • escitalopram (LEXAPRO) 20 mg tablet Take 20 mg by mouth   • medroxyPROGESTERone (DEPO-PROVERA) 150 mg/mL injection INJECT 150 MG IM ONCE,INSTR:BRING TO OFFICE FOR ADMINISTRATION   • oxybutynin (DITROPAN) 5 mg tablet Take 5 mg by mouth   • traZODone (DESYREL) 50 mg tablet Take 50 mg by mouth   • [DISCONTINUED] LORazepam (ATIVAN) 0 5 mg tablet Take 0 5 mg by mouth as needed       Objective     /74 (BP Location: Left arm, Patient Position: Sitting, Cuff Size: Large)   Pulse 102   Ht 5' 2" (1 575 m)   Wt 104 kg (229 lb)   SpO2 98%   BMI 41 88 kg/m²     Physical Exam  Constitutional:       General: She is not in acute distress  Appearance: Normal appearance  She is obese  HENT:      Nose: Nose normal    Eyes:      Extraocular Movements: Extraocular movements intact  Conjunctiva/sclera: Conjunctivae normal    Cardiovascular:      Rate and Rhythm: Normal rate and regular rhythm  Pulses: Normal pulses  Heart sounds: Normal heart sounds  Pulmonary:      Effort: Pulmonary effort is normal  No respiratory distress  Breath sounds: Normal breath sounds  No wheezing  Abdominal:      General: Abdomen is flat  Musculoskeletal:         General: No swelling or deformity  Normal range of motion  Cervical back: Normal range of motion  Skin:     Findings: No erythema or rash  Neurological:      Mental Status: She is alert and oriented to person, place, and time     Psychiatric:         Mood and Affect: Mood normal          Behavior: Behavior normal        CLARENCE Guo

## 2023-01-19 ENCOUNTER — PATIENT OUTREACH (OUTPATIENT)
Dept: FAMILY MEDICINE CLINIC | Facility: CLINIC | Age: 33
End: 2023-01-19

## 2023-01-19 ENCOUNTER — OFFICE VISIT (OUTPATIENT)
Dept: FAMILY MEDICINE CLINIC | Facility: CLINIC | Age: 33
End: 2023-01-19

## 2023-01-19 VITALS
SYSTOLIC BLOOD PRESSURE: 128 MMHG | HEIGHT: 62 IN | OXYGEN SATURATION: 98 % | BODY MASS INDEX: 42.14 KG/M2 | DIASTOLIC BLOOD PRESSURE: 74 MMHG | WEIGHT: 229 LBS | HEART RATE: 102 BPM

## 2023-01-19 DIAGNOSIS — F32.A ANXIETY AND DEPRESSION: ICD-10-CM

## 2023-01-19 DIAGNOSIS — Z11.59 NEED FOR HEPATITIS C SCREENING TEST: ICD-10-CM

## 2023-01-19 DIAGNOSIS — R42 VERTIGO: Primary | ICD-10-CM

## 2023-01-19 DIAGNOSIS — Z11.4 SCREENING FOR HIV (HUMAN IMMUNODEFICIENCY VIRUS): ICD-10-CM

## 2023-01-19 DIAGNOSIS — R73.03 PREDIABETES: ICD-10-CM

## 2023-01-19 DIAGNOSIS — E66.01 MORBID OBESITY WITH BMI OF 40.0-44.9, ADULT (HCC): ICD-10-CM

## 2023-01-19 DIAGNOSIS — F41.9 ANXIETY AND DEPRESSION: ICD-10-CM

## 2023-01-19 DIAGNOSIS — E78.00 ELEVATED LDL CHOLESTEROL LEVEL: ICD-10-CM

## 2023-01-19 LAB — SL AMB POCT HEMOGLOBIN AIC: 6.3 (ref ?–6.5)

## 2023-01-19 RX ORDER — LORAZEPAM 0.5 MG/1
0.5 TABLET ORAL AS NEEDED
Qty: 30 TABLET | Refills: 0 | Status: SHIPPED | OUTPATIENT
Start: 2023-01-19

## 2023-01-19 NOTE — PATIENT INSTRUCTIONS
Prediabetes   AMBULATORY CARE:   Prediabetes  is a blood glucose (sugar) level that is higher than normal  It is not high enough to be considered diabetes  Prediabetes increases your risk for type 2 diabetes and heart disease, especially if you have high blood pressure or high cholesterol  The following increase your risk for prediabetes:   Being overweight or obese, with a body mass index (BMI) of 25 or higher (23 or higher if you are  American)    Lack of physical activity    Older age    Family history of diabetes (parent or sibling)    A history of heart disease, gestational diabetes, or polycystic ovary syndrome (PCOS)    High blood pressure or cholesterol levels    Being [place] American, , , [place] American, or     In children, having a mother with diabetes or gestational diabetes mellitus (GDM) during the pregnancy    Signs and symptoms:  Prediabetes may not cause any symptoms  Call your doctor if:   You have more hunger or thirst than usual     You are urinating more often than usual     You are always exhausted  You have blurred vision  You have questions or concerns about your condition or care  Prevent or delay type 2 diabetes:  Healthy choices work best to delay or prevent type 2 diabetes  You may be given the following guidelines from your healthcare provider:  Get regular physical activity  Adults should get at least 150 minutes (2 5 hours) of moderate physical activity every week  Spread the amount of activity over at least 3 days a week  Do not skip more than 2 days in a row  Children should get at least 60 minutes of moderate physical activity on most days of the week  Examples of moderate physical activity include brisk walking, running, and swimming  Do not sit for longer than 30 minutes at a time  Work with your healthcare provider to create a plan for physical activity  Lose weight if you are overweight    A weight loss of 7% of your body weight can help  Your healthcare provider can tell you what weight is healthy for you  He or she can help you create a weight loss plan  Eat healthy foods  Eat a variety of fruits and vegetables  Eat whole-grain foods more often  Choose dairy foods, meat, and other protein foods that are low in fat  Eat fewer sweets, such as candy, cookies, regular soda, and sweetened drinks  You can also decrease calories by eating smaller portion sizes  Work with your healthcare provider or dietitian to develop a meal plan that is right for you  Take medicine as directed  Your healthcare provider may give diabetes medicine if you are at high risk for diabetes  You may also need medicines for high blood pressure and high cholesterol  Follow up with your healthcare provider as directed  You will need to return every year to get tested for diabetes  Do not smoke  Do not use e-cigarettes or smokeless tobacco in place of cigarettes or to help you quit  They still contain nicotine  Ask your healthcare provider for information if you currently smoke and need help quitting  Start with small goals and work your way up  You may need to start with 3 days of physical activity  You can add a day after 3 weeks or so of activity  Make a goal of losing 5 pounds at first  Swap a piece of fruit for dessert or sweets  Start with 2 fruits in a week and increase it weekly  These are suggestions  Talk with healthcare providers about making a plan that is right for you  Follow up with your doctor as directed: If you do have prediabetes, you will need to return every year to get tested for diabetes  Write down your questions so you remember to ask them during your visits  © Copyright [employer] 2022 Information is for End User's use only and may not be sold, redistributed or otherwise used for commercial purposes   All illustrations and images included in CareNotes® are the copyrighted property of [employer]  or [address]  The above information is an  only  It is not intended as medical advice for individual conditions or treatments  Talk to your doctor, nurse or pharmacist before following any medical regimen to see if it is safe and effective for you  room air

## 2023-01-19 NOTE — ASSESSMENT & PLAN NOTE
"Getting pops and pains in my ear now and then    dizziness now gone"  Has visit with ENT Feb 9th in case condition fails to improve

## 2023-01-19 NOTE — PROGRESS NOTES
Patient was in to see provider for a follow up visit  She was referred to care management for lifestyle modification program  Patient was in care management program last year, but stopped returning to outreach calls and messages  Due to this she was discharged from program  I spoke with patient today about past and current challenges  She consented to participation in the program and understand that it requires regular outreaches to be successful  Outreach 1/24 for start

## 2023-01-19 NOTE — ASSESSMENT & PLAN NOTE
Patient had a recent death in the family and as a result, is requesting a refill of her Ativan  This medication was last refilled in July 2022  Will bypass the controlled substances contract and protocol today as the patient is not using Ativan routinely  Advised the patient that if she begins to request refills of this medication routinely, that we will have to schedule a visit to complete the controlled substances protocol  Patient in agreement

## 2023-01-19 NOTE — ASSESSMENT & PLAN NOTE
Point-of-care A1c elevated today at 6 3, which is consistent with a diagnosis of prediabetes  Reviewed A1c norms with the patient and informed her that if aggressive lifestyle changes are not initiated, she would likely develop diabetes in the near future  Patient reports that she believes her elevated A1c is a result of her poor diet over the holiday season  Patient agreed to reestablish with Carin Loaiza of our care management team to discuss diet and exercise

## 2023-01-24 ENCOUNTER — PATIENT OUTREACH (OUTPATIENT)
Age: 33
End: 2023-01-24

## 2023-01-24 NOTE — PROGRESS NOTES
Spoke with patient during scheduled outreach  She gained 5lbs since her appointment in December and really has not done anything to reduce her weight  She stated that she is going to work with me and return calls so that she can have better health since being diagnosed with pre-diabetes  I told her we will start slow with the changes so that she is not overwhelmed  When asked what she drinks in a day she stated that she drinks 16oz of international delight or [employer] iced coffee which is 190calories per 12oz  She also drinks regular soda and tea in the evening and only about 2 bottles of water a day  I proposed the following schedule of drinks 1 bottle of water in the morning prior to the coffee, 8oz of the pre-made coffee, 2 bottles of water, 10oz of soda or tea, and 2 more bottles of water  I also suggested drinking no caffeinated drinks after 5pm  She stated that she doesn't sleep well and has been taking trazadone nightly to help with sleep  I suspect that once she is better hydrated and cuts out some of the caffeine she will be able to sleep better  This is her "homework" for the next week and we will re-evaluate her success next outreach

## 2023-01-30 ENCOUNTER — RA CDI HCC (OUTPATIENT)
Dept: OTHER | Facility: HOSPITAL | Age: 33
End: 2023-01-30

## 2023-01-30 NOTE — PROGRESS NOTES
F32 A Depression- found in active problem list - Can Depression be further classified using more specific code     Benedict Elizondo [address]    [facility]  coding opportunities          Chart Reviewed number of suggestions sent to Provider: 1     Patients Insurance        Commercial Insurance: [address] [place] [address]

## 2023-01-31 ENCOUNTER — PATIENT OUTREACH (OUTPATIENT)
Age: 33
End: 2023-01-31

## 2023-01-31 NOTE — PROGRESS NOTES
Spoke with patient during scheduled outreach  During last visit we talked about slowly decreasing the amount of caffeine she drinks and increasing water  Patient tried to stop caffeine completely and had a very large headache so then she decided to go back to my suggestion of decreasing the amount rather than stopping cold turkey  She was able to get 4 bottles of water in along with 8 oz of oceanspray cranberry/blueberry, 12 oz of morning iced pre-made coffee, and 2 c of regular icy tea  She did note that she is breaking out and not sleeping as well, which I assured her was her body adjusting to the changes  She does weigh herself regularly and has lost 1 5lbs  Just by switching her hydration routine  For next week stick with morning coffee, but increase water intake to 5 bottles   Next outreach 2/6

## 2023-02-03 ENCOUNTER — CONSULT (OUTPATIENT)
Dept: SURGICAL ONCOLOGY | Facility: CLINIC | Age: 33
End: 2023-02-03

## 2023-02-03 ENCOUNTER — CLINICAL SUPPORT (OUTPATIENT)
Dept: FAMILY MEDICINE CLINIC | Facility: CLINIC | Age: 33
End: 2023-02-03

## 2023-02-03 VITALS
BODY MASS INDEX: 42.14 KG/M2 | DIASTOLIC BLOOD PRESSURE: 70 MMHG | HEART RATE: 105 BPM | HEIGHT: 62 IN | RESPIRATION RATE: 16 BRPM | WEIGHT: 229 LBS | SYSTOLIC BLOOD PRESSURE: 120 MMHG | OXYGEN SATURATION: 97 % | TEMPERATURE: 97.3 F

## 2023-02-03 DIAGNOSIS — Z12.39 BREAST CANCER SCREENING, HIGH RISK PATIENT: ICD-10-CM

## 2023-02-03 DIAGNOSIS — Z91.89 INCREASED RISK OF BREAST CANCER: ICD-10-CM

## 2023-02-03 DIAGNOSIS — Z84.81 FAMILY HISTORY OF GENE MUTATION: Primary | ICD-10-CM

## 2023-02-03 DIAGNOSIS — R73.03 PREDIABETES: ICD-10-CM

## 2023-02-03 DIAGNOSIS — Z11.4 SCREENING FOR HIV (HUMAN IMMUNODEFICIENCY VIRUS): ICD-10-CM

## 2023-02-03 DIAGNOSIS — Z11.59 NEED FOR HEPATITIS C SCREENING TEST: ICD-10-CM

## 2023-02-03 DIAGNOSIS — F32.A ANXIETY AND DEPRESSION: ICD-10-CM

## 2023-02-03 DIAGNOSIS — E66.01 MORBID OBESITY WITH BMI OF 40.0-44.9, ADULT (HCC): ICD-10-CM

## 2023-02-03 DIAGNOSIS — E78.00 ELEVATED LDL CHOLESTEROL LEVEL: ICD-10-CM

## 2023-02-03 DIAGNOSIS — Z80.3 FAMILY HISTORY OF BREAST CANCER: ICD-10-CM

## 2023-02-03 DIAGNOSIS — F41.9 ANXIETY AND DEPRESSION: ICD-10-CM

## 2023-02-03 NOTE — PATIENT INSTRUCTIONS
Breast Self Exam for Women   AMBULATORY CARE:   A breast self-exam (BSE)  is a way to check your breasts for lumps and other changes  Regular BSEs can help you know how your breasts normally look and feel  Most breast lumps or changes are not cancer, but you should always have them checked by a healthcare provider  Why you should do a BSE:  Breast cancer is the most common type of cancer in women  Even if you have mammograms, you may still want to do a BSE regularly  If you know how your breasts normally feel and look, it may help you know when to contact your healthcare provider  Mammograms can miss some cancers  You may find a lump during a BSE that did not show up on a mammogram   When you should do a BSE:  If you have periods, you may want to do your BSE 1 week after your period ends  This is the time when your breasts may be the least swollen, lumpy, or tender  You can do regular BSEs even if you are breastfeeding or have breast implants  Call your doctor if:   You find any lumps or changes in your breasts  You have breast pain or fluid coming from your nipples  You have questions or concerns about your condition or care  How to do a BSE:       Look at your breasts in a mirror  Look at the size and shape of each breast and nipple  Check for swelling, lumps, dimpling, scaly skin, or other skin changes  Look for nipple changes, such as a nipple that is painful or beginning to pull inward  Gently squeeze both nipples and check to see if fluid (that is not breast milk) comes out of them  If you find any of these or other breast changes, contact your healthcare provider  Check your breasts while you sit or  the following 3 positions:    [place] your arms down at your sides  Raise your hands and join them behind your head  Put firm pressure with your hands on your hips  Bend slightly forward while you look at your breasts in the mirror  Lie down and feel your breasts    When you lie down, your breast tissue spreads out evenly over your chest  This makes it easier for you to feel for lumps and anything that may not be normal for your breasts  Do a BSE on one breast at a time  Place a small pillow or towel under your left shoulder  Put your left arm behind your head  Use the 3 middle fingers of your right hand  Use your fingertip pads, on the top of your fingers  Your fingertip pad is the most sensitive part of your finger  Use small circles to feel your breast tissue  Use your fingertip pads to make dime-sized, overlapping circles on your breast and armpits  Use light, medium, and firm pressure  First, press lightly  Second, press with medium pressure to feel a little deeper into the breast  Last, use firm pressure to feel deep within your breast     Examine your entire breast area  Examine the breast area from above the breast to below the breast where you feel only ribs  Make small circles with your fingertips, starting in the middle of your armpit  Make circles going up and down the breast area  Continue toward your breast and all the way across it  Examine the area from your armpit all the way over to the middle of your chest (breastbone)  Stop at the middle of your chest     Move the pillow or towel to your right shoulder, and put your right arm behind your head  Use the 3 fingertip pads of your left hand, and repeat the above steps to do a BSE on your right breast     What else you can do to check for breast problems or cancer:  Talk to your healthcare provider about mammograms  A mammogram is an x-ray of your breasts to screen for breast cancer or other problems  Your provider can tell you the benefits and risks of mammograms  The first mammogram is usually at age 39 or 48  Your provider may recommend you start at 36 or younger if your risk for breast cancer is high  Mammograms usually continue every 1 to 2 years until age 76         Follow up with your doctor as directed:  Write down your questions so you remember to ask them during your visits  © Copyright [employer] 2022 Information is for End User's use only and may not be sold, redistributed or otherwise used for commercial purposes  All illustrations and images included in CareNotes® are the copyrighted property of A D A M , Inc  or Sunil Hernandez  The above information is an  only  It is not intended as medical advice for individual conditions or treatments  Talk to your doctor, nurse or pharmacist before following any medical regimen to see if it is safe and effective for you

## 2023-02-03 NOTE — PROGRESS NOTES
Surgical Oncology Follow Up       [facility]  [facility] [address]    Governor Vazquez  1990  [id number]      No chief complaint on file  Assessment/Plan:  1  Breast cancer screening, high risk patient      2  Family history of breast cancer      3  Increased risk of breast cancer  - Consume healthy diet, exercise regularly, maintain healthy weight      4  Family history of gene mutation      Discussion/Summary: Patient is a 34year old female that has a family history of breast cancer and a known Chek2 mutation in the family  She recently underwent genetic testing which is still pending  I reviewed that if her genetic testing is negative, she would be considered average risk of developing breast cancer  If she also carries the Chek2 mutation, she would have an elevated risk of breast cancer and I would recommend that she begin annual breast MRI in the near future, and mammograms in her late 35s (10 years younger from her cousin's age at diagnosis)  She had a mammogram in December which was benign and there are no concerns on her exam  I will see her back in six months for another exam  At that time, we will determine the appropriate screening regimen for her  Reviewed risk reducing measures today  She was instructed to contact me with any changes on exam prior to that time  History of Present Illness:     -Interval History: Patient is a 34year old female that presents today for a consultation secondary to a family history of breast cancer  She has a known CHEK2 mutation in her paternal family history  Patient underwent genetic testing recently and the results are pending  She reports no breast concerns  She had a bilateral mammogram in December of 2022 which was BIRADS 1 category 2 density  Menarche age 7, has never been pregnant   She is premenopausal      Family history:   Father- thyroid cancer  - late 45s  Paternal grandmother- pancreatic cancer - late 62s  Paternal aunt- thyroid cancer- late 45s  Paternal aunt- breast cancer- late 46s  3 paternal cousins- late 41s/48s        Review of Systems:  Review of Systems   Constitutional: Negative for activity change, appetite change, chills, fatigue, fever and unexpected weight change  Respiratory: Negative for cough and shortness of breath  Cardiovascular: Negative for chest pain  Gastrointestinal: Negative for abdominal pain, constipation, diarrhea, nausea and vomiting  Musculoskeletal: Negative for arthralgias, back pain, gait problem and myalgias  Skin: Negative for color change and rash  Allergic/Immunologic: Positive for environmental allergies  Neurological: Negative for dizziness and headaches  Hematological: Negative for adenopathy  Psychiatric/Behavioral: Positive for sleep disturbance  Negative for agitation and confusion  The patient is nervous/anxious  All other systems reviewed and are negative        Patient Active Problem List   Diagnosis   • Anxiety and depression   • Overactive bladder   • Primary insomnia   • Family history of breast cancer   • Elevated LDL cholesterol level   • Morbid obesity with BMI of 40 0-44 9, adult (HCC)   • Vertigo   • Prediabetes   • Increased risk of breast cancer   • Family history of gene mutation     Past Medical History:   Diagnosis Date   • Allergic within the last 3 years    Batrium, Wellibutrin, seasonal   • Anxiety 2011   • Arthritis    • Depression    • Migraines      Past Surgical History:   Procedure Laterality Date   • SHOULDER ARTHROSCOPY Left    • SHOULDER SURGERY     • WISDOM TOOTH EXTRACTION     • WISDOM TOOTH EXTRACTION       Family History   Problem Relation Age of Onset   • Heart failure Mother    • Asthma Mother    • Arthritis Mother         RA   • Thyroid cancer Father 36   • Heart disease Father    • Thyroid disease Father 36   • Cancer Father         thyroid   • No Known Problems Maternal Grandmother    • Hypertension Maternal Grandfather    • Diabetes Maternal Grandfather    • Diabetes Paternal Grandmother    • Hypertension Paternal Grandmother    • Pancreatic cancer Paternal Grandmother 61   • Mental illness Paternal Grandmother    • Depression Paternal Grandmother    • Stroke Paternal Grandmother    • Arthritis Paternal Grandmother    • Cancer Paternal Grandmother    • Anxiety disorder Paternal Grandmother    • Suicide Attempts Paternal Grandmother    • Diabetes Paternal Grandfather    • No Known Problems Maternal Aunt    • BRCA2 Positive Paternal Aunt    • Thyroid disease Paternal Aunt    • Breast cancer Paternal Aunt         late 45s, early 46s   • BRCA2 Positive Paternal Aunt    • Breast cancer Paternal [de-identified]         late 45s, early 46s   • BRCA2 Positive Paternal [de-identified]    • Breast cancer Paternal [de-identified]         late 45s, early 46s   • Thyroid cancer Paternal Aunt 44   • Thyroid disease Paternal [de-identified]    • Cancer Paternal Aunt         thyroid   • Breast cancer Paternal Aunt    • BRCA2 Positive Cousin    • Breast cancer Cousin         late 45s, early 46s   • BRCA2 Positive Cousin    • Breast cancer Cousin         late 45s, early 46s   • Breast cancer Cousin    • Breast cancer Cousin    • Breast cancer Cousin    • Breast cancer additional onset Neg Hx    • BRCA2 Negative Neg Hx    • BRCA1 Positive Neg Hx    • BRCA 1/2 Neg Hx    • BRCA1 Negative Neg Hx    • Ovarian cancer Neg Hx    • Endometrial cancer Neg Hx    • Colon cancer Neg Hx      Social History     Socioeconomic History   • Marital status: /Civil Union     Spouse name: Not on file   • Number of children: Not on file   • Years of education: Not on file   • Highest education level: Not on file   Occupational History   • Not on file   Tobacco Use   • Smoking status: Never   • Smokeless tobacco: Never   Vaping Use   • Vaping Use: Never used   Substance and Sexual Activity   • Alcohol use: Not Currently   • Drug use: Never   • Sexual activity: Yes     Partners: Female     Birth control/protection: Injection     Comment: Depo shot   Other Topics Concern   • Not on file   Social History Narrative   • Not on file     Social Determinants of Health     Financial Resource Strain: Not on file   Food Insecurity: Not on file   Transportation Needs: Not on file   Physical Activity: Not on file   Stress: Not on file   Social Connections: Not on file   Intimate Partner Violence: Not on file   Housing Stability: Not on file       Current Outpatient Medications:   •  escitalopram (LEXAPRO) 20 mg tablet, Take 20 mg by mouth, Disp: , Rfl:   •  LORazepam (ATIVAN) 0 5 mg tablet, Take 1 tablet (0 5 mg total) by mouth as needed for anxiety, Disp: 30 tablet, Rfl: 0  •  medroxyPROGESTERone (DEPO-PROVERA) 150 mg/mL injection, INJECT 150 MG IM ONCE,INSTR:BRING TO OFFICE FOR ADMINISTRATION, Disp: , Rfl:   •  oxybutynin (DITROPAN) 5 mg tablet, Take 5 mg by mouth, Disp: , Rfl:   •  traZODone (DESYREL) 50 mg tablet, Take 50 mg by mouth, Disp: , Rfl:   Allergies   Allergen Reactions   • Bupropion Hives   • Sulfamethoxazole-Trimethoprim Hives     Vitals:    02/03/23 1053   BP: 120/70   Pulse: 105   Resp: 16   Temp: (!) 97 3 °F (36 3 °C)   SpO2: 97%       Physical Exam  Vitals reviewed  Constitutional:       Appearance: Normal appearance  HENT:      Head: Normocephalic and atraumatic  Pulmonary:      Effort: Pulmonary effort is normal       Breath sounds: Normal breath sounds  Chest:   Breasts:     Right: No swelling, bleeding, inverted nipple, mass, nipple discharge, skin change or tenderness  Left: No swelling, bleeding, inverted nipple, mass, nipple discharge, skin change or tenderness  Lymphadenopathy:      Upper Body:      Right upper body: No supraclavicular or axillary adenopathy  Left upper body: No supraclavicular or axillary adenopathy  Neurological:      General: No focal deficit present        Mental Status: She is alert and oriented to person, place, and time  Psychiatric:         Mood and Affect: Mood normal          Advance Care Planning/Advance Directives:  Discussed disease status and treatment goals with the patient

## 2023-02-04 LAB
ALBUMIN SERPL-MCNC: 4.3 G/DL (ref 3.6–5.1)
ALBUMIN/GLOB SERPL: 1.4 (CALC) (ref 1–2.5)
ALP SERPL-CCNC: 85 U/L (ref 31–125)
ALT SERPL-CCNC: 12 U/L (ref 6–29)
AST SERPL-CCNC: 13 U/L (ref 10–30)
BASOPHILS # BLD AUTO: 45 CELLS/UL (ref 0–200)
BASOPHILS NFR BLD AUTO: 0.4 %
BILIRUB SERPL-MCNC: 0.3 MG/DL (ref 0.2–1.2)
BUN SERPL-MCNC: 9 MG/DL (ref 7–25)
BUN/CREAT SERPL: ABNORMAL (CALC) (ref 6–22)
CALCIUM SERPL-MCNC: 9.6 MG/DL (ref 8.6–10.2)
CHLORIDE SERPL-SCNC: 108 MMOL/L (ref 98–110)
CHOLEST SERPL-MCNC: 202 MG/DL
CHOLEST/HDLC SERPL: 6.1 (CALC)
CO2 SERPL-SCNC: 26 MMOL/L (ref 20–32)
CREAT SERPL-MCNC: 0.74 MG/DL (ref 0.5–0.97)
EOSINOPHIL # BLD AUTO: 260 CELLS/UL (ref 15–500)
EOSINOPHIL NFR BLD AUTO: 2.3 %
ERYTHROCYTE [DISTWIDTH] IN BLOOD BY AUTOMATED COUNT: 13.9 % (ref 11–15)
GFR/BSA.PRED SERPLBLD CYS-BASED-ARV: 109 ML/MIN/1.73M2
GLOBULIN SER CALC-MCNC: 3 G/DL (CALC) (ref 1.9–3.7)
GLUCOSE SERPL-MCNC: 105 MG/DL (ref 65–99)
HCT VFR BLD AUTO: 41.8 % (ref 35–45)
HCV AB S/CO SERPL IA: <0.02
HCV AB SERPL QL IA: NORMAL
HDLC SERPL-MCNC: 33 MG/DL
HGB BLD-MCNC: 14 G/DL (ref 11.7–15.5)
HIV 1+2 AB+HIV1 P24 AG SERPL QL IA: NORMAL
LDLC SERPL CALC-MCNC: 148 MG/DL (CALC)
LYMPHOCYTES # BLD AUTO: 2430 CELLS/UL (ref 850–3900)
LYMPHOCYTES NFR BLD AUTO: 21.5 %
MCH RBC QN AUTO: 27 PG (ref 27–33)
MCHC RBC AUTO-ENTMCNC: 33.5 G/DL (ref 32–36)
MCV RBC AUTO: 80.7 FL (ref 80–100)
MONOCYTES # BLD AUTO: 893 CELLS/UL (ref 200–950)
MONOCYTES NFR BLD AUTO: 7.9 %
NEUTROPHILS # BLD AUTO: 7673 CELLS/UL (ref 1500–7800)
NEUTROPHILS NFR BLD AUTO: 67.9 %
NONHDLC SERPL-MCNC: 169 MG/DL (CALC)
PLATELET # BLD AUTO: 323 THOUSAND/UL (ref 140–400)
PMV BLD REES-ECKER: 11.5 FL (ref 7.5–12.5)
POTASSIUM SERPL-SCNC: 4.3 MMOL/L (ref 3.5–5.3)
PROT SERPL-MCNC: 7.3 G/DL (ref 6.1–8.1)
RBC # BLD AUTO: 5.18 MILLION/UL (ref 3.8–5.1)
SODIUM SERPL-SCNC: 142 MMOL/L (ref 135–146)
TRIGL SERPL-MCNC: 101 MG/DL
TSH SERPL-ACNC: 1.47 MIU/L
WBC # BLD AUTO: 11.3 THOUSAND/UL (ref 3.8–10.8)

## 2023-02-06 ENCOUNTER — CLINICAL SUPPORT (OUTPATIENT)
Dept: FAMILY MEDICINE CLINIC | Facility: CLINIC | Age: 33
End: 2023-02-06

## 2023-02-06 ENCOUNTER — PATIENT OUTREACH (OUTPATIENT)
Age: 33
End: 2023-02-06

## 2023-02-06 VITALS
BODY MASS INDEX: 42.36 KG/M2 | HEIGHT: 62 IN | WEIGHT: 230.2 LBS | SYSTOLIC BLOOD PRESSURE: 110 MMHG | DIASTOLIC BLOOD PRESSURE: 66 MMHG

## 2023-02-06 DIAGNOSIS — Z30.42 FAMILY PLANNING, DEPO-PROVERA CONTRACEPTION MONITORING/ADMINISTRATION: Primary | ICD-10-CM

## 2023-02-06 RX ORDER — MEDROXYPROGESTERONE ACETATE 150 MG/ML
150 INJECTION, SUSPENSION INTRAMUSCULAR ONCE
Status: COMPLETED | OUTPATIENT
Start: 2023-02-06 | End: 2023-02-06

## 2023-02-06 RX ADMIN — MEDROXYPROGESTERONE ACETATE 150 MG: 150 INJECTION, SUSPENSION INTRAMUSCULAR at 15:39

## 2023-02-07 NOTE — PROGRESS NOTES
Spoke with patient during scheduled outreach  She was doing well over the last week with drinking water and cut back to 1 coffee in morning and 1 tea in the afternoon  She stated she is feeling good, but her sleep isn't great  We talked about the next step in the process and I suggested using a calorie counting veto to track her food intake  I am hoping this brings awareness to the portions and foods she is eating  Next out reach 2/9 to discuss current calorie intake

## 2023-02-09 ENCOUNTER — PATIENT OUTREACH (OUTPATIENT)
Dept: FAMILY MEDICINE CLINIC | Facility: CLINIC | Age: 33
End: 2023-02-09

## 2023-02-09 NOTE — PROGRESS NOTES
Spoke with patient during scheduled outreach  She is doing well getting 5 bottles of water with 10oz of coffee and 1 cup of tea, but she isn't having the taste for juices or other previously preferred beverages  With tracking she was at 6726-0093 calories  I suggested she do a 7603-7479 calorie range  She does find that she is boredom eating  I suggested drinking a cup of water and finding something to do if she isn't really hungry  She is still having problems with acne, but she felt that was a stress response  She is sleeping good the past couple nights   Next outreach 2/16

## 2023-02-16 ENCOUNTER — PATIENT OUTREACH (OUTPATIENT)
Dept: FAMILY MEDICINE CLINIC | Facility: CLINIC | Age: 33
End: 2023-02-16

## 2023-02-16 NOTE — PROGRESS NOTES
Spoke with patient during scheduled outreach call  Patient is continuing to stress eat as she is tracking and not maintaining the 6898-0276 daily calorie range that was set  I let her know that it is ok and the important thing is that she doesn't give up and sees each day as a new opportunity to do better  She is continuing to do well with less caffeine and drinking water and stated that per her scale she is at 220 which is the lowest that we have on record for her  I encouraged her to go easy on herself and stay the course   At this time the goals are the same and next outreach is in 2wks

## 2023-02-20 DIAGNOSIS — F32.A ANXIETY AND DEPRESSION: Primary | ICD-10-CM

## 2023-02-20 DIAGNOSIS — F41.9 ANXIETY AND DEPRESSION: Primary | ICD-10-CM

## 2023-02-20 DIAGNOSIS — N32.81 OVERACTIVE BLADDER: ICD-10-CM

## 2023-02-20 RX ORDER — ESCITALOPRAM OXALATE 20 MG/1
20 TABLET ORAL DAILY
Qty: 90 TABLET | Refills: 3 | Status: SHIPPED | OUTPATIENT
Start: 2023-02-20

## 2023-02-20 RX ORDER — TRAZODONE HYDROCHLORIDE 50 MG/1
50 TABLET ORAL
Qty: 90 TABLET | Refills: 3 | Status: SHIPPED | OUTPATIENT
Start: 2023-02-20

## 2023-02-20 RX ORDER — OXYBUTYNIN CHLORIDE 5 MG/1
5 TABLET ORAL DAILY
Qty: 90 TABLET | Refills: 3 | Status: SHIPPED | OUTPATIENT
Start: 2023-02-20

## 2023-02-27 ENCOUNTER — TELEPHONE (OUTPATIENT)
Dept: GENETICS | Facility: CLINIC | Age: 33
End: 2023-02-27

## 2023-02-27 NOTE — TELEPHONE ENCOUNTER
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), heterozygous, pathogenic  The CHEK2 gene is associated with an increased risk for autosomal dominant female and male breast cancer (with predisposition for ER+ disease), colon, thyroid and prostate cancers  The risks of these cancers, particularly breast, have been determined to be both variant- and family history-dependent  Additionally, there is preliminary evidence supporting a correlation with CHEK2 and autosomal dominant predisposition to other cancer types including urinary tract cancer, ovarian cancer and melanoma (PMID: 41580887, 33665814,87853410, 63005172, 36810745); however, the available evidence is insufficient to make a determination regarding these relationships  Women with a mutation in the CHEK2 gene have an estimated 20-40% lifetime risk for breast cancer (PMID: 23176546, 28982628)  Women with CHEK2 mutations who have had breast cancer have a significantly increased risk of developing a second breast cancer  The estimated absolute lifetime risk for colorectal cancer in men and women who are CHEK2 positive is 5-10%  There is an increased risk for male breast, prostate and thyroid cancers, but lifetime risks are not established  Reproductive Risks  Individuals with two copies of the CHEK2 1100delC variant (i e , those who are homozygous) have a higher breast cancer risk compared with those with a single copy (i e , those who are heterozygous)  The lifetime risk of breast cancer in individuals homozygous for this variant is estimated to be increased four to six fold (PMID: 57469238, 82920562)   The estimates of other cancer risks among homozygotes or compound heterozygotes involving other variants is unclear  Risks and Testing for Family Members: This test result may help clarify the risk for other family members to develop cancer  All first-degree relatives (parents, siblings and children) have up to a 50% chance of having the pathogenic variant  Other relatives such as aunts, uncles and cousins may also be at risk  Given that several of Rachael's paternal cousins tested positive for this CHEK2 variant, it was likely inherited from the paternal side  We encouraged Alex Franklin to share her genetic test results with her relatives, particularly on her paternal side  If Rachael's family members have any questions or are interested in testing they can reach out to the [facility] number at [phone] for additional information  [employer] offers free family member testing for any of Rachael's blood relatives up to 150 days following a positive genetic test result  If any of [place] family members have any quesitons regarding genetic testing would like to pursue genetic testing to understand if they carry the same CHEK2 mutation as Alex Franklin they can reach out to the [employer] number at [phone] for additional information  Additional Information:  A healthy lifestyle will improve overall health and reduce risk for illness  Eating a healthy diet and exercising for 4 hours per week is recommended  Both diet and exercise have been shown to help maintain a healthy weight  Postmenopausal women who are overweight are at higher risk for breast cancer  Moderate to heavy alcohol use can increase the risk for some cancers  Smoking cigarettes can also increase risk for breast, lung, prostate, pancreatic and other cancers  Management:  Management guidelines for individuals with pathogenic and likely pathogenic CHEK2 variants have been developed by the [facility]    Please refer to the current NCCN guidelines for the most up to date guidelines  The recommendations listed below are specific to [facility] and are based on recommendations in the V2 2023 The Genetic/Familial High-Risk Assessment: Breast, Ovarian and Pancreatic Guideline and V1 2022 Genetic/Familial High-Risk Assessment: Colorectal Guideline  These recommendations are subject to change over time and the newest guidelines should be referenced for the most up to date recommendations  Plan:   Breast Cancer Screening  Options for breast cancer surveillance and management should be discussed with a breast specialist or treating healthcare provider   - Consider breast MRI with contrast starting at 30-35  - Annual mammogram beginning at age 36  - Evidence of risk-reducing mastectomy is insufficient; manage based on family history    Colon Cancer Screening:  Colonoscopy screening every 5 years beginning at age 36  - If an individual has a first-degree relative with colorectal cancer, screening should begin 10 years prior to the relative’s age at diagnosis if before 36  - If an individual has a personal history of colorectal cancer, screening recommendations should be based on recommendations for post-colorectal cancer resection  Other Screening: There are currently no specific recommendations for thyroid, prostate, or kidney cancer screening for individuals who carry a CHEK2 pathogenic variant  Individuals may consider having an annual thyroid exam and urinalysis by their primary care physician  There are no additional recommendations based on Rachael's result  she should continue cancer screening and medical management as clinically indicated and as determined appropriate by her healthcare providers      Summary:   Positive Result: [facility] was strongly encouraged to follow up on with our office on an annual basis to review the most up to date guidelines as recommendations are subject to change over time

## 2023-03-01 ENCOUNTER — TELEPHONE (OUTPATIENT)
Dept: GENETICS | Facility: CLINIC | Age: 33
End: 2023-03-01

## 2023-03-01 NOTE — TELEPHONE ENCOUNTER
----- Message from [facility] sent at 2/27/2023  2:46 PM EST -----  Regarding: Chart Complete  GC Completed Chart     Patient requested mailed and [employer] message with results and result note    Thank you!

## 2023-03-02 ENCOUNTER — PATIENT OUTREACH (OUTPATIENT)
Dept: FAMILY MEDICINE CLINIC | Facility: CLINIC | Age: 33
End: 2023-03-02

## 2023-03-02 NOTE — PROGRESS NOTES
Spoke with patient during scheduled outreach  She is finding it hard to maintain the 8111-9393 calorie range because she is not the one who does the cooking  She has a yogurt or applesauce for breakfast and then leftovers for lunch  I suggested cutting even further back on her portion and adding in a salad or veges to bring the calories down into the zone  I also suggested speaking with her spouse to see if she would be willing to change the meals to make them more healthy less high calorie in order to help the patient on her journey to better health   Next outreach 3/16

## 2023-03-16 ENCOUNTER — PATIENT OUTREACH (OUTPATIENT)
Dept: FAMILY MEDICINE CLINIC | Facility: CLINIC | Age: 33
End: 2023-03-16

## 2023-03-16 NOTE — PROGRESS NOTES
Spoke with the patient during scheduled outreach patient has been able to maintain 1400 to 1500 daily calories  Patient states that she weighs 220 pounds which is a £10 weight loss from one month ago  Based on calorie tracker patient stated that her carbs are at 50% fat as at 38% and protein is at 22%  Patient stated that she is getting in enough water and only has tea two to three days a week  Since maintaining 1400 to 1500 calories is a struggle some days I recommended rebalancing the macros to be 50% carbs 30% protein and 20% fat  I introduced anti obesity medication to the patient and provided her with the websites in order to gain some more information as well as providing her an overview of the medications  Patient is unsure of the medication at this point  Patient stated that she did get genetic results back showing that she has a predisposition to breast thyroid and colon cancer  She has an appointment with the [facility] in the end of August  Patient has an in office appointment April 20th at which time I told her she could start on the anti obesity medication if she was interested  Patient verbalized understanding of set goals with next outreach March 30th

## 2023-03-30 ENCOUNTER — PATIENT OUTREACH (OUTPATIENT)
Dept: FAMILY MEDICINE CLINIC | Facility: CLINIC | Age: 33
End: 2023-03-30

## 2023-03-30 DIAGNOSIS — E66.01 MORBID OBESITY WITH BMI OF 40.0-44.9, ADULT (HCC): Primary | ICD-10-CM

## 2023-03-30 NOTE — PROGRESS NOTES
Spoke to patient during scheduled outreach call  Patient has been maintaining 9399-6800 calories, but not loosing and is feeling defeated  She has also been drinking water like she is supposed to  Patient is on lexapro and depo shots as well as has PCOS  She also has a family history of medullary thyroid cancer  I am sending a referral to clinical pharmacist for guidance with adjusting or helping with finding medications that would be more appropriate  [employer] message send to open communication   Next outreach in office 4/20

## 2023-04-07 ENCOUNTER — TELEPHONE (OUTPATIENT)
Dept: ADMINISTRATIVE | Facility: OTHER | Age: 33
End: 2023-04-07

## 2023-04-07 NOTE — TELEPHONE ENCOUNTER
Upon review of the In Basket request we were able to locate, review, and update the patient chart as requested for Pap Smear (HPV) aka Cervical Cancer Screening  Any additional questions or concerns should be emailed to the Practice Liaisons via the appropriate education email address, please do not reply via In Basket  Thank you  Shaun Landrum         04/07/23 8:26 AM     [facility] CareGap SmartForm used to document caregap status      Shaun Landrum

## 2023-04-07 NOTE — TELEPHONE ENCOUNTER
----- Message from Yessica Hanks sent at 4/6/2023  2:09 PM EDT -----  Regarding: Care Gap request  04/06/23 2:09 PM    Hello, our patient attached above has had Pap Smear (HPV) aka Cervical Cancer Screening completed/performed  Please assist in updating the patient chart by pulling the document from the Media Tab  The date of service is 2019       Thank you,  Miranda Harris  [address]

## 2023-04-20 PROBLEM — R42 VERTIGO: Status: RESOLVED | Noted: 2023-01-18 | Resolved: 2023-04-20

## 2023-04-20 PROBLEM — M25.561 CHRONIC PAIN OF BOTH KNEES: Status: ACTIVE | Noted: 2023-04-20

## 2023-04-20 PROBLEM — G89.29 CHRONIC PAIN OF BOTH KNEES: Status: ACTIVE | Noted: 2023-04-20

## 2023-04-20 PROBLEM — M25.562 CHRONIC PAIN OF BOTH KNEES: Status: ACTIVE | Noted: 2023-04-20

## 2023-05-02 ENCOUNTER — CLINICAL SUPPORT (OUTPATIENT)
Dept: FAMILY MEDICINE CLINIC | Facility: CLINIC | Age: 33
End: 2023-05-02

## 2023-05-02 DIAGNOSIS — Z30.42 FAMILY PLANNING, DEPO-PROVERA CONTRACEPTION MONITORING/ADMINISTRATION: Primary | ICD-10-CM

## 2023-05-03 DIAGNOSIS — Z30.8 ENCOUNTER FOR OTHER CONTRACEPTIVE MANAGEMENT: Primary | ICD-10-CM

## 2023-05-03 RX ORDER — MEDROXYPROGESTERONE ACETATE 150 MG/ML
150 INJECTION, SUSPENSION INTRAMUSCULAR ONCE
Status: COMPLETED | OUTPATIENT
Start: 2023-05-03 | End: 2023-05-03

## 2023-05-03 RX ADMIN — MEDROXYPROGESTERONE ACETATE 150 MG: 150 INJECTION, SUSPENSION INTRAMUSCULAR at 09:27

## 2023-05-12 RX ORDER — MELOXICAM 15 MG/1
15 TABLET ORAL DAILY
COMMUNITY
Start: 2023-05-02 | End: 2024-01-27

## 2023-05-18 ENCOUNTER — PATIENT OUTREACH (OUTPATIENT)
Dept: FAMILY MEDICINE CLINIC | Facility: CLINIC | Age: 33
End: 2023-05-18

## 2023-05-18 NOTE — PROGRESS NOTES
Spoke with patient during scheduled outreach  Patient has not lost any weight neither has she gained since starting qysmia  She did say she has dry mouth so she is drinking a lot more water than she did before which is not bad for weightloss in general  She did say the increased trazadone is helping with her sleep as she is out in 20min of taking it at night  She does not have sensation of reduced hunger on medication, but she is on lower dose  She will start increased dose tomorrow  I did suggest if interested she could drop her calories to 1300 from 1400  Patient also stated her spouse has been better with drinking water and mentioned that she would like to start making healthier choices  This would be great for the patient since at this point she feels like she is in this alone  I reminded patient to make annual physical appointment for anytime after 7/15  Patient verbalized understanding   Next phone outreach 6/15

## 2023-05-25 ENCOUNTER — CLINICAL SUPPORT (OUTPATIENT)
Dept: FAMILY MEDICINE CLINIC | Facility: CLINIC | Age: 33
End: 2023-05-25

## 2023-05-25 DIAGNOSIS — E66.01 MORBID OBESITY WITH BMI OF 40.0-44.9, ADULT (HCC): Primary | ICD-10-CM

## 2023-05-25 NOTE — PROGRESS NOTES
[id number] [facility], located within [facility] [place], Pharmacist     Brianna Bonilla is a 34 y o  female who was referred to the pharmacist for obesity and weight loss education and management, referred by Tahir Root   Patient presented for an in person appt    Assessment/ Plan      1  Obesity   • Baseline Weight: 227 lbs  • Most recent Weight: 227 lbs  • 5% weight loss goal (to be met at week 12): 215 lbs  • Weight loss:   o Stage 2 - BMI > 25 with at least one severe complication or requires more aggressive weight loss for effective treatment - add pharmacotherapy with BMI > 27, consider bariatric surgery with BMI > 35 per the AACE/ACE guidelines  • Patient's weight-related disease of complication: Prediabetes, HLD  • Medication options/discussion  o Qsymia- Started 3 75/ 23 mg caps for 14 days and felt good on medication  Unfortunately, cost has been prohibitive for her and she did not  next higher dose of 7 5-46 mg caps    o GLP-1 agonist contraindicated due to family hx of thyroid cancer   o Contrave not an option due to allergic reaction in the past on bupropion     Depression / Anxiety  · Insomnia has improved with increased trazodone dose but has not seen improvement in depression sx at this time  It can take 6-8 weeks to see mood improvements  ·  PHQ-9 was 13 at last visit (moderate depression)   · Medication options/discussion  · Avoid bupropion (hives)  · Has tried other SSRIs in the past  Escitalopram has given her a partial response  Trazodone at bedtime for sleep  Room to increase trazodone for both insomnia and depression  · Can consider SNRI in future in place of escitalopram      Changes to Medication Regimen: If PCP is in agreement with plan  • Continue trazodone 100 mg daily  • Looking into Qsymia cost  Unsure if patient will continue depending on cost  Currently not taking       A 5% weight loss goal with Qsymia is recommended at 12 weeks per AACE/ACE guidelines  If goal is not reached, medication can be titrated up to max dose for an additional 12 weeks  2  Medication Reconciliation:   • Medication list reviewed with pt at today's visit  • Medication list updated to reflect medications pt is currently taking    3  BMI Counseling There is no height or weight on file to calculate BMI  The BMI is above normal  Nutrition recommendations include decreasing overall calorie intake  Exercise recommendations include moderate aerobic physical activity for 150 minutes/week  Pharmacotherapy was ordered for patient to aid in weight loss  Follow-up:  TBD based on medication cost     Subjective        1  Current medications:  · Has been without Qsymia for ~1week due to medication cost  States that pharmacy told her cost was $200 for 30 day supply for the higher dose and then applied coupon card which brought cost down to $95  Felt like she was slimming down although didn't see much change on home scale  Decreased appetite and smaller portion sizes  Less snacking  Did have dry mouth which made her drink more water which was actually a good thing  · escitalopram 20 mg- On since 2017 for depression  Started on 5 mg then went up 20 mg which did work well for her until recently  She now feels depression sx have worsened  · Trazodone 100 mg- takes for sleep at bedtime  This has helped with sleep but does not feel it is as effective as she would like for depression sx although it has only been ~3 weeks  Previous meds  · wellbutrin - allergic reaction (hives)  · Celexa - Took in the past  Was not effective and switched to escitalopram     Qsymia Coupon card:   [place]: [id number]  [facility]   Group: [id number]  ID: [id number]    0  Lifestyle:   • Working with care manager for diet and lifestyle mgmt  Objective       ASCVD Risk:  The ASCVD Risk score (Ev BRENNAN, et al , 2019) failed to calculate for the following reasons:     The 2019 ASCVD risk score is only valid for ages 36 to 78     Vitals: Wt Readings from Last 5 Encounters:   04/20/23 103 kg (227 lb 12 8 oz)   02/06/23 104 kg (230 lb 3 2 oz)   02/03/23 104 kg (229 lb)   01/19/23 104 kg (229 lb)   01/06/23 102 kg (225 lb)       BP Readings from Last 3 Encounters:   04/20/23 126/82   02/06/23 110/66   02/03/23 120/70       Pulse Readings from Last 3 Encounters:   04/20/23 88   02/03/23 105   01/19/23 102     Home  Baseline: 224 lbs  5/21/23: 221 lbs    Labs:  Lab Results   Component Value Date    ALKPHOS 85 02/03/2023    ALT 12 02/03/2023    AST 13 02/03/2023    BUN 9 02/03/2023    CALCIUM 9 6 02/03/2023     02/03/2023    CO2 26 02/03/2023    CREATININE 0 74 02/03/2023    EGFR 109 02/03/2023    GLUC 105 (H) 02/03/2023    K 4 3 02/03/2023    SODIUM 142 02/03/2023    TBILI 0 3 02/03/2023    TP 7 3 02/03/2023         Pharmacist Tracking Tool       Pharmacist Tracking Tool  Reason For Outreach: Embedded Pharmacist  Demographics:  Intervention Method:  In Person  Type of Intervention: Follow-Up  Topics Addressed: Anxiety, Depression and Obesity  Pharmacologic Interventions: N/A  Non-Pharmacologic Interventions: Cost, Disease state education and Medication/Device education  Time:  Direct Patient Care: 15 mins  Care Coordination: 15 mins  Recommendation Recipient: Patient/Caregiver and Provider  Outcome: Accepted

## 2023-05-26 ENCOUNTER — TELEPHONE (OUTPATIENT)
Dept: FAMILY MEDICINE CLINIC | Facility: CLINIC | Age: 33
End: 2023-05-26

## 2023-05-26 NOTE — TELEPHONE ENCOUNTER
Called insurance and pharmacy to get more information on medication cost      The price through insurance alone (without the coupon) is $159 32  The price comes from a $70 co-pay + $89 32 deductible that has not been met yet  If patient were to pay the $89 32 deductible this month, then the cost at the pharmacy next month and after would be $70/ month co-pay  If you read the fine print on the coupon savings card, it can also take an additional $65 off that total of $159 32 which would bring the cost this month down to $94 32  It sounds like any money the coupon card takes off does still go towards the deductible  There is fine print on the coupon card that it will take off $65 however it requires that patient to still pay the initial $70/ month so if patient were to fill next month, even with the card the cost would be $70      Pharmacist Tracking Tool  Reason For Outreach: Embedded Pharmacist  Demographics:  Intervention Method: Phone  Type of Intervention: Follow-Up  Topics Addressed: Obesity  Pharmacologic Interventions: N/A  Non-Pharmacologic Interventions: Cost  Time:  Direct Patient Care: 0 mins  Care Coordination: 60 mins  Recommendation Recipient: N/A  Outcome: N/A

## 2023-06-15 ENCOUNTER — PATIENT OUTREACH (OUTPATIENT)
Dept: FAMILY MEDICINE CLINIC | Facility: CLINIC | Age: 33
End: 2023-06-15

## 2023-06-15 NOTE — PROGRESS NOTES
Spoke with patient during scheduled outreach  Unfortunately, she was not able to continue with weight loss med due to financial concerns  She has not been able to focus on tracking over the last couple weeks  Her adoption with [employer] is now finalized  Her wife is eating differently since seeing the GI so she feels like it will be able to start back up again  I suggested that she try to stick with tracking [id number] calories a day  She did say she Is doing well with water consumption  Patient verbalized understanding   Next outreach 7/7

## 2023-06-21 DIAGNOSIS — F32.A ANXIETY AND DEPRESSION: ICD-10-CM

## 2023-06-21 DIAGNOSIS — F51.01 PRIMARY INSOMNIA: ICD-10-CM

## 2023-06-21 DIAGNOSIS — F41.9 ANXIETY AND DEPRESSION: ICD-10-CM

## 2023-06-21 RX ORDER — TRAZODONE HYDROCHLORIDE 100 MG/1
100 TABLET ORAL
Qty: 30 TABLET | Refills: 2 | Status: SHIPPED | OUTPATIENT
Start: 2023-06-21

## 2023-07-03 ENCOUNTER — TELEMEDICINE (OUTPATIENT)
Age: 33
End: 2023-07-03

## 2023-07-03 DIAGNOSIS — L23.7 POISON IVY DERMATITIS: Primary | ICD-10-CM

## 2023-07-03 PROCEDURE — 99213 OFFICE O/P EST LOW 20 MIN: CPT | Performed by: STUDENT IN AN ORGANIZED HEALTH CARE EDUCATION/TRAINING PROGRAM

## 2023-07-03 RX ORDER — METHYLPREDNISOLONE 4 MG/1
TABLET ORAL
Qty: 21 EACH | Refills: 0 | Status: CANCELLED | OUTPATIENT
Start: 2023-07-03

## 2023-07-03 RX ORDER — PREDNISONE 20 MG/1
TABLET ORAL
Qty: 8 TABLET | Refills: 0 | Status: SHIPPED | OUTPATIENT
Start: 2023-07-03 | End: 2023-07-13

## 2023-07-03 RX ORDER — TRIAMCINOLONE ACETONIDE 5 MG/G
CREAM TOPICAL 2 TIMES DAILY
Qty: 15 G | Refills: 1 | Status: SHIPPED | OUTPATIENT
Start: 2023-07-03

## 2023-07-03 NOTE — PROGRESS NOTES
Virtual Regular Visit    Verification of patient location:    Patient is located at Other in the following state in which I hold an active license [place]      Assessment/Plan:    Problem List Items Addressed This Visit    None  Visit Diagnoses     Poison ivy dermatitis    -  Primary    Relevant Medications    triamcinolone (KENALOG) 0.5 % cream    predniSONE 20 mg tablet          Patient is a 79-year-old female presenting for poison ivy today. Due to her rash spreading I will place her on a steroid taper with 20 mg of prednisone daily for 5 days and then 10 mg for 5 days. I will also prescribe her a triamcinolone cream that she can apply to the hands and extremities as needed. Take the full course of steroids and follow-up in our office if not improved after full treatment course. 15 minutes spent on dictation, physical exam and plan of care. This note was dictated using software. Reason for visit is   Chief Complaint   Patient presents with   • Poison [facility]        Encounter provider Darius Thompson DO    Provider located at [address] [address] [phone]      Recent Visits  No visits were found meeting these conditions. Showing recent visits within past 7 days and meeting all other requirements  Today's Visits  Date Type Provider Dept   07/03/23 122 [address] [place] [address],  [address], [address]   Showing today's visits and meeting all other requirements  Future Appointments  No visits were found meeting these conditions. Showing future appointments within next 150 days and meeting all other requirements       The patient was identified by name and date of birth. Rachel George was informed that this is a telemedicine visit and that the visit is being conducted through the "[employer]. She agrees to proceed. .  My office door was closed.  No one else was in the room. She acknowledged consent and understanding of privacy and security of the video platform. The patient has agreed to participate and understands they can discontinue the visit at any time. Patient is aware this is a billable service. Subjective  Rachel George is a 34 y.o. female presenting for rash. Patient presents for evaluation of rash on hands, chest, arms, butt, thighs. Onset of symptoms was abrupt starting several weeks ago, and has been unchanged since that time. . Symptoms include itching and weeping. Care prior to arrival consisted of nothing, with no relief. HPI     Past Medical History:   Diagnosis Date   • Allergic within the last 3 years    Willis, Ron, seasonal   • Anxiety 2011   • Arthritis    • Depression    • Migraines        Past Surgical History:   Procedure Laterality Date   • SHOULDER ARTHROSCOPY Left    • SHOULDER SURGERY     • WISDOM TOOTH EXTRACTION     • WISDOM TOOTH EXTRACTION         Current Outpatient Medications   Medication Sig Dispense Refill   • escitalopram (LEXAPRO) 20 mg tablet Take 1 tablet (20 mg total) by mouth daily 90 tablet 3   • medroxyPROGESTERone (DEPO-PROVERA) 150 mg/mL injection INJECT 150 MG IM ONCE,INSTR:BRING TO OFFICE FOR ADMINISTRATION     • meloxicam (MOBIC) 15 mg tablet Take 15 mg by mouth daily     • oxybutynin (DITROPAN) 5 mg tablet Take 1 tablet (5 mg total) by mouth in the morning 90 tablet 3   • predniSONE 20 mg tablet Take 1 tablet (20 mg total) by mouth daily for 5 days, THEN 0.5 tablets (10 mg total) daily for 5 days. 8 tablet 0   • traZODone (DESYREL) 100 mg tablet Take 1 tablet (100 mg total) by mouth daily at bedtime 30 tablet 2   • triamcinolone (KENALOG) 0.5 % cream Apply topically 2 (two) times a day 15 g 1     No current facility-administered medications for this visit.         Allergies   Allergen Reactions   • Bupropion Hives   • Sulfamethoxazole-Trimethoprim Hives       Review of Systems   Constitutional: Negative for activity change and fever. Skin: Positive for rash. Negative for color change and wound. itching       Video Exam    There were no vitals filed for this visit. Physical Exam  Constitutional:       General: She is not in acute distress. Appearance: Normal appearance. She is not ill-appearing or toxic-appearing. HENT:      Head: Normocephalic and atraumatic. Right Ear: External ear normal.      Left Ear: External ear normal.      Nose: Nose normal.   Eyes:      Extraocular Movements: Extraocular movements intact. Conjunctiva/sclera: Conjunctivae normal.      Pupils: Pupils are equal, round, and reactive to light. Pulmonary:      Effort: Pulmonary effort is normal.   Musculoskeletal:         General: Normal range of motion. Cervical back: Normal range of motion. Skin:     General: Skin is dry. Findings: Rash (Noted poison ivy to left hand, right hand left side of face near her ear. Reported rash on gluteal area, bilateral thighs and chest.) present. Neurological:      Mental Status: She is alert and oriented to person, place, and time. Mental status is at baseline. Psychiatric:         Mood and Affect: Mood normal.         Behavior: Behavior normal.         Thought Content:  Thought content normal.         Judgment: Judgment normal.          Visit Time  Total Visit Duration: 15

## 2023-07-05 NOTE — PROGRESS NOTES
Assessment        Diagnoses and all orders for this visit:    Encntr for gyn exam (general) (routine) w/o abn findings    Family history of gene mutation    Increased risk of breast cancer    Monoallelic mutation of CHEK2 gene in female patient             Plan      All questions answered. Diagnosis explained in detail, including differential.  Educational material distributed. Follow up in 1 year. Follow up as needed. PAP deferred - will await records from [facility]  PCP to perform thryoid exam/imaging and urinalysis  Referred to surgical oncology breast screening recommendations  Colonoscopy at age 35  PAP deferred    Zeke Washington is a 34 y.o. female who presents for annual exam.      Chief Complaint   Patient presents with   • Gynecologic Exam     Pt reports no concerns. Does want to discuss genetic testing results         She is sexually active  She has a female partner,  x 11 years  CHEK 2 variant - based on genetic oncology    Has surgical oncology appt for supplemental screening.     She has no breast lumps, no pain, no nipple discharge.     She has multiple family members with breast cancer. They all have the CHEK2 variant     She is on DEPO PROVERA due to recurrent ovarian cyst.  She does not desire children. She has been on Depo for 9 years.   Last Depo in November (gets this done at [address] [place] [address])     Last Pap:  2022 Normal per pt, [facility] [place] - no records    Last mammogram: 21 normal, recommended at age 35     HPV vaccine completed:no  Current contraception: none  History of abnormal Pap smear: no  History of abnormal mammogram: no  Family history of uterine or ovarian cancer: maternal GGM (unknown age)  Family history of breast cancer: dad's half sister (late 45s)  Dad's 2 cousins (late 45s, early 46s)  2 Paternal great aunts (both 46s)     Family history of colon cancer: no             OB History    Para Term  AB Living   0 0 0 0 0 0 SAB IAB Ectopic Multiple Live Births   0 0 0 0 0       Menstrual History:  OB History        0    Para   0    Term   0       0    AB   0    Living   0       SAB   0    IAB   0    Ectopic   0    Multiple   0    Live Births   0                Menarche age: 7  No LMP recorded (lmp unknown). Patient has had an injection.              Past Medical History:   Diagnosis Date   • Allergic within the last 3 years    Batrium, Wellibutrin, seasonal   • Anxiety    • Arthritis    • Depression    • Migraines      Past Surgical History:   Procedure Laterality Date   • SHOULDER ARTHROSCOPY Left    • SHOULDER SURGERY     • WISDOM TOOTH EXTRACTION     • WISDOM TOOTH EXTRACTION       Family History   Problem Relation Age of Onset   • Heart failure Mother    • Asthma Mother    • Arthritis Mother         RA   • Thyroid cancer Father 36   • Heart disease Father    • Thyroid disease Father    • Cancer Father         thyroid   • No Known Problems Maternal Grandmother    • Hypertension Maternal Grandfather    • Diabetes Maternal Grandfather    • Diabetes Paternal Grandmother    • Hypertension Paternal Grandmother    • Pancreatic cancer Paternal Grandmother 61   • Mental illness Paternal Grandmother    • Depression Paternal Grandmother    • Stroke Paternal Grandmother    • Arthritis Paternal Grandmother    • Cancer Paternal Grandmother    • Anxiety disorder Paternal Grandmother    • Suicide Attempts Paternal Grandmother    • Diabetes Paternal Grandfather    • No Known Problems Maternal Aunt    • BRCA2 Positive Paternal Aunt    • Thyroid disease Paternal Aunt    • Breast cancer Paternal Aunt         late 45s, early 46s   • BRCA2 Positive Paternal Aunt    • Breast cancer Paternal Aunt         late 45s, early 46s   • BRCA2 Positive Paternal Aunt    • Breast cancer Paternal Aunt         late 45s, early 46s   • Thyroid cancer Paternal Aunt 44   • Thyroid disease Paternal Aunt    • Cancer Paternal Aunt         thyroid   • Breast cancer Paternal Aunt    • BRCA2 Positive Cousin    • Breast cancer Cousin         late 45s, early 46s   • BRCA2 Positive Cousin    • Breast cancer Cousin         late 45s, early 46s   • Breast cancer Cousin    • Breast cancer Cousin    • Breast cancer Cousin    • Breast cancer additional onset Neg Hx    • BRCA2 Negative Neg Hx    • BRCA1 Positive Neg Hx    • BRCA 1/2 Neg Hx    • BRCA1 Negative Neg Hx    • Ovarian cancer Neg Hx    • Endometrial cancer Neg Hx    • Colon cancer Neg Hx        Social History     Tobacco Use   • Smoking status: Never   • Smokeless tobacco: Never   Vaping Use   • Vaping Use: Never used   Substance Use Topics   • Alcohol use: Not Currently   • Drug use: Never          Current Outpatient Medications:   •  escitalopram (LEXAPRO) 20 mg tablet, Take 1 tablet (20 mg total) by mouth daily, Disp: 90 tablet, Rfl: 3  •  medroxyPROGESTERone (DEPO-PROVERA) 150 mg/mL injection, INJECT 150 MG IM ONCE,INSTR:BRING TO OFFICE FOR ADMINISTRATION, Disp: , Rfl:   •  meloxicam (MOBIC) 15 mg tablet, Take 15 mg by mouth daily, Disp: , Rfl:   •  oxybutynin (DITROPAN) 5 mg tablet, Take 1 tablet (5 mg total) by mouth in the morning, Disp: 90 tablet, Rfl: 3  •  predniSONE 20 mg tablet, Take 1 tablet (20 mg total) by mouth daily for 5 days, THEN 0.5 tablets (10 mg total) daily for 5 days. , Disp: 8 tablet, Rfl: 0  •  traZODone (DESYREL) 100 mg tablet, Take 1 tablet (100 mg total) by mouth daily at bedtime, Disp: 30 tablet, Rfl: 2  •  triamcinolone (KENALOG) 0.5 % cream, Apply topically 2 (two) times a day, Disp: 15 g, Rfl: 1    Allergies   Allergen Reactions   • Bupropion Hives   • Sulfamethoxazole-Trimethoprim Hives           Review of Systems   Constitutional: Negative. HENT: Negative. Eyes: Negative. Respiratory: Negative. Cardiovascular: Negative. Gastrointestinal: Negative. Endocrine: Negative. Genitourinary:        As noted in HPI   Musculoskeletal: Negative. Skin: Negative. Allergic/Immunologic: Negative. Neurological: Negative. Hematological: Negative. Psychiatric/Behavioral: Negative. /70 (BP Location: Right arm, Patient Position: Sitting, Cuff Size: Adult)   Pulse 81   Temp 98.6 °F (37 °C) (Tympanic)   Ht 5' 2" (1.575 m)   Wt 104 kg (229 lb)   LMP  (LMP Unknown)   BMI 41.88 kg/m²         Physical Exam  Constitutional:       Appearance: She is well-developed. Genitourinary:      Vulva, bladder and rectum normal.      No lesions in the vagina. Genitourinary Comments:         Right Labia: No rash, tenderness, lesions, skin changes or Bartholin's cyst.     Left Labia: No tenderness, lesions, skin changes, Bartholin's cyst or rash. No inguinal adenopathy present in the right or left side. No vaginal discharge, tenderness or bleeding. No vaginal prolapse present. No vaginal atrophy present. Right Adnexa: not tender, not full and no mass present. Left Adnexa: not tender, not full and no mass present. No cervical motion tenderness, friability, lesion or polyp. Uterus is not enlarged or tender. Pelvic exam was performed with patient in the lithotomy position. Rectum:      No external hemorrhoid. Breasts:     Right: No mass, nipple discharge, skin change or tenderness. Left: No mass, nipple discharge, skin change or tenderness. HENT:      Head: Normocephalic. Nose: Nose normal.   Eyes:      Conjunctiva/sclera: Conjunctivae normal.   Neck:      Thyroid: No thyromegaly. Cardiovascular:      Rate and Rhythm: Normal rate and regular rhythm. Heart sounds: Normal heart sounds. No murmur heard. Pulmonary:      Effort: Pulmonary effort is normal. No respiratory distress. Breath sounds: Normal breath sounds. No wheezing or rales. Abdominal:      General: There is no distension. Palpations: Abdomen is soft. There is no mass. Tenderness: There is no abdominal tenderness.  There is no guarding or rebound. Musculoskeletal:         General: No tenderness. Cervical back: Neck supple. No muscular tenderness. Lymphadenopathy:      Cervical: No cervical adenopathy. Lower Body: No right inguinal adenopathy. No left inguinal adenopathy. Neurological:      Mental Status: She is alert and oriented to person, place, and time. Skin:     General: Skin is warm and dry.    Psychiatric:         Mood and Affect: Mood normal.         Behavior: Behavior normal.             Future Appointments   Date Time Provider [address] [place] [address]   7/27/2023  1:00  [address], CRNP KAROLINE SD [facility]   8/2/2023 10:00 AM ALIVIA Conroy NURSE KAROLINE QUITNANA [facility]   8/4/2023  9:30 AM CLARENCE Cifuentes SURG ON[facility]

## 2023-07-05 NOTE — PATIENT INSTRUCTIONS
Wellness Visit for Adults   AMBULATORY CARE:   A wellness visit  is when you see your healthcare provider to get screened for health problems. Your healthcare provider will also give you advice on how to stay healthy. Write down your questions so you remember to ask them. Ask your healthcare provider how often you should have a wellness visit. What happens at a wellness visit:  Your healthcare provider will ask about your health, and your family history of health problems. This includes high blood pressure, heart disease, and cancer. He or she will ask if you have symptoms that concern you, if you smoke, and about your mood. You may also be asked about your intake of medicines, supplements, food, and alcohol. Any of the following may be done: Your weight  will be checked. Your height may also be checked so your body mass index (BMI) can be calculated. Your BMI shows if you are at a healthy weight. Your blood pressure  and heart rate will be checked. Your temperature may also be checked. Blood and urine tests  may be done. Blood tests may be done to check your cholesterol levels. Abnormal cholesterol levels increase your risk for heart disease and stroke. You may also need a blood or urine test to check for diabetes if you are at increased risk. Urine tests may be done to look for signs of an infection or kidney disease. A physical exam  includes checking your heartbeat and lungs with a stethoscope. Your healthcare provider may also check your skin to look for sun damage. Screening tests  may be recommended. A screening test is done to check for diseases that may not cause symptoms. The screening tests you may need depend on your age, gender, family history, and lifestyle habits. For example, colorectal screening may be recommended if you are 48years old or older. Screening tests you need if you are a woman:   A Pap smear  is used to screen for cervical cancer.  Pap smears are usually done every 3 to 5 years depending on your age. You may need them more often if you have had abnormal Pap smear test results in the past. Ask your healthcare provider how often you should have a Pap smear. A mammogram  is an x-ray of your breasts to screen for breast cancer. Experts recommend mammograms every 2 years starting at age 48 years. You may need a mammogram at age 52 years or younger if you have an increased risk for breast cancer. Talk to your healthcare provider about when you should start having mammograms and how often you need them. Vaccines you may need:   Get an influenza vaccine  every year. The influenza vaccine protects you from the flu. Several types of viruses cause the flu. The viruses change over time, so new vaccines are made each year. Get a tetanus-diphtheria (Td) booster vaccine  every 10 years. This vaccine protects you against tetanus and diphtheria. Tetanus is a severe infection that may cause painful muscle spasms and lockjaw. Diphtheria is a severe bacterial infection that causes a thick covering in the back of your mouth and throat. Get a human papillomavirus (HPV) vaccine  if you are female and aged 23 to 32 or male 23 to 24 and never received it. This vaccine protects you from HPV infection. HPV is the most common infection spread by sexual contact. HPV may also cause vaginal, penile, and anal cancers. Get a pneumococcal vaccine  if you are aged 72 years or older. The pneumococcal vaccine is an injection given to protect you from pneumococcal disease. Pneumococcal disease is an infection caused by pneumococcal bacteria. The infection may cause pneumonia, meningitis, or an ear infection. Get a shingles vaccine  if you are 60 or older, even if you have had shingles before. The shingles vaccine is an injection to protect you from the varicella-zoster virus. This is the same virus that causes chickenpox.  Shingles is a painful rash that develops in people who had chickenpox or have been exposed to the virus. How to eat healthy:  My Plate is a model for planning healthy meals. It shows the types and amounts of foods that should go on your plate. Fruits and vegetables make up about half of your plate, and grains and protein make up the other half. A serving of dairy is included on the side of your plate. The amount of calories and serving sizes you need depends on your age, gender, weight, and height. Examples of healthy foods are listed below:  Eat a variety of vegetables  such as dark green, red, and orange vegetables. You can also include canned vegetables low in sodium (salt) and frozen vegetables without added butter or sauces. Eat a variety of fresh fruits , canned fruit in 100% juice, frozen fruit, and dried fruit. Include whole grains. At least half of the grains you eat should be whole grains. Examples include whole-wheat bread, wheat pasta, brown rice, and whole-grain cereals such as oatmeal.    Eat a variety of protein foods such as seafood (fish and shellfish), lean meat, and poultry without skin (turkey and chicken). Examples of lean meats include pork leg, shoulder, or tenderloin, and beef round, sirloin, tenderloin, and extra lean ground beef. Other protein foods include eggs and egg substitutes, beans, peas, soy products, nuts, and seeds. Choose low-fat dairy products such as skim or 1% milk or low-fat yogurt, cheese, and cottage cheese. Limit unhealthy fats  such as butter, hard margarine, and shortening. Exercise:  Exercise at least 30 minutes per day on most days of the week. Some examples of exercise include walking, biking, dancing, and swimming. You can also fit in more physical activity by taking the stairs instead of the elevator or parking farther away from stores. Include muscle strengthening activities 2 days each week. Regular exercise provides many health benefits.  It helps you manage your weight, and decreases your risk for type 2 diabetes, heart disease, stroke, and high blood pressure. Exercise can also help improve your mood. Ask your healthcare provider about the best exercise plan for you. General health and safety guidelines:   Do not smoke. Nicotine and other chemicals in cigarettes and cigars can cause lung damage. Ask your healthcare provider for information if you currently smoke and need help to quit. E-cigarettes or smokeless tobacco still contain nicotine. Talk to your healthcare provider before you use these products. Limit alcohol. A drink of alcohol is 12 ounces of beer, 5 ounces of wine, or 1½ ounces of liquor. Lose weight, if needed. Being overweight increases your risk of certain health conditions. These include heart disease, high blood pressure, type 2 diabetes, and certain types of cancer. Protect your skin. Do not sunbathe or use tanning beds. Use sunscreen with a SPF 15 or higher. Apply sunscreen at least 15 minutes before you go outside. Reapply sunscreen every 2 hours. Wear protective clothing, hats, and sunglasses when you are outside. Drive safely. Always wear your seatbelt. Make sure everyone in your car wears a seatbelt. A seatbelt can save your life if you are in an accident. Do not use your cell phone when you are driving. This could distract you and cause an accident. Pull over if you need to make a call or send a text message. Practice safe sex. Use latex condoms if are sexually active and have more than one partner. Your healthcare provider may recommend screening tests for sexually transmitted infections (STIs). Wear helmets, lifejackets, and protective gear. Always wear a helmet when you ride a bike or motorcycle, go skiing, or play sports that could cause a head injury. Wear protective equipment when you play sports. Wear a lifejacket when you are on a boat or doing water sports.     © Copyright [employer] 2022 Information is for End User's use only and may not be sold, redistributed or otherwise used for commercial purposes. The above information is an  only. It is not intended as medical advice for individual conditions or treatments. Talk to your doctor, nurse or pharmacist before following any medical regimen to see if it is safe and effective for you.

## 2023-07-06 ENCOUNTER — ANNUAL EXAM (OUTPATIENT)
Dept: OBGYN CLINIC | Facility: CLINIC | Age: 33
End: 2023-07-06
Payer: COMMERCIAL

## 2023-07-06 VITALS
TEMPERATURE: 98.6 F | DIASTOLIC BLOOD PRESSURE: 70 MMHG | BODY MASS INDEX: 42.14 KG/M2 | SYSTOLIC BLOOD PRESSURE: 118 MMHG | WEIGHT: 229 LBS | HEIGHT: 62 IN | HEART RATE: 81 BPM

## 2023-07-06 DIAGNOSIS — Z15.89 MONOALLELIC MUTATION OF CHEK2 GENE IN FEMALE PATIENT: ICD-10-CM

## 2023-07-06 DIAGNOSIS — Z15.09 MONOALLELIC MUTATION OF CHEK2 GENE IN FEMALE PATIENT: ICD-10-CM

## 2023-07-06 DIAGNOSIS — Z15.01 MONOALLELIC MUTATION OF CHEK2 GENE IN FEMALE PATIENT: ICD-10-CM

## 2023-07-06 DIAGNOSIS — Z01.419 ENCNTR FOR GYN EXAM (GENERAL) (ROUTINE) W/O ABN FINDINGS: Primary | ICD-10-CM

## 2023-07-06 DIAGNOSIS — Z91.89 INCREASED RISK OF BREAST CANCER: ICD-10-CM

## 2023-07-06 DIAGNOSIS — Z84.81 FAMILY HISTORY OF GENE MUTATION: ICD-10-CM

## 2023-07-06 DIAGNOSIS — Z15.02 MONOALLELIC MUTATION OF CHEK2 GENE IN FEMALE PATIENT: ICD-10-CM

## 2023-07-06 PROCEDURE — S0612 ANNUAL GYNECOLOGICAL EXAMINA: HCPCS | Performed by: OBSTETRICS & GYNECOLOGY

## 2023-07-07 ENCOUNTER — PATIENT OUTREACH (OUTPATIENT)
Dept: FAMILY MEDICINE CLINIC | Facility: CLINIC | Age: 33
End: 2023-07-07

## 2023-07-07 NOTE — PROGRESS NOTES
Called patient during scheduled outreach time. No answer, voicemail left with callback information. My Chart message sent with response request for best day and time to reschedule outreach. Will reach out again if no response.

## 2023-07-24 ENCOUNTER — PATIENT OUTREACH (OUTPATIENT)
Age: 33
End: 2023-07-24

## 2023-07-24 ENCOUNTER — TELEPHONE (OUTPATIENT)
Dept: FAMILY MEDICINE CLINIC | Facility: CLINIC | Age: 33
End: 2023-07-24

## 2023-07-24 DIAGNOSIS — F51.01 PRIMARY INSOMNIA: ICD-10-CM

## 2023-07-24 DIAGNOSIS — F32.A ANXIETY AND DEPRESSION: ICD-10-CM

## 2023-07-24 DIAGNOSIS — F41.9 ANXIETY AND DEPRESSION: ICD-10-CM

## 2023-07-24 RX ORDER — TRAZODONE HYDROCHLORIDE 100 MG/1
100 TABLET ORAL
Qty: 90 TABLET | Refills: 1 | Status: SHIPPED | OUTPATIENT
Start: 2023-07-24

## 2023-07-24 NOTE — PROGRESS NOTES
Chart review. I have not heard from patient since 6/15. If I do not receive a message to reschedule by 7/31 I will assume she is no longer interested and I will close the case with the care management.

## 2023-07-25 ENCOUNTER — RA CDI HCC (OUTPATIENT)
Dept: OTHER | Facility: HOSPITAL | Age: 33
End: 2023-07-25

## 2023-07-25 NOTE — PROGRESS NOTES
F32.A Depression- found in active problem list - Can Depression be further classified using more specific code      F32A    [address] [facility] coding opportunities          Chart Reviewed number of suggestions sent to Provider: 1     Patients Insurance        Commercial Insurance: Mahesh Camacho

## 2023-07-27 ENCOUNTER — OFFICE VISIT (OUTPATIENT)
Dept: FAMILY MEDICINE CLINIC | Facility: CLINIC | Age: 33
End: 2023-07-27

## 2023-07-27 ENCOUNTER — PATIENT OUTREACH (OUTPATIENT)
Dept: FAMILY MEDICINE CLINIC | Facility: CLINIC | Age: 33
End: 2023-07-27

## 2023-07-27 VITALS
HEIGHT: 62 IN | WEIGHT: 234.4 LBS | DIASTOLIC BLOOD PRESSURE: 77 MMHG | SYSTOLIC BLOOD PRESSURE: 117 MMHG | BODY MASS INDEX: 43.13 KG/M2 | HEART RATE: 95 BPM | OXYGEN SATURATION: 97 %

## 2023-07-27 DIAGNOSIS — M25.562 CHRONIC PAIN OF BOTH KNEES: ICD-10-CM

## 2023-07-27 DIAGNOSIS — Z00.00 WELL ADULT EXAM: Primary | ICD-10-CM

## 2023-07-27 DIAGNOSIS — F32.A ANXIETY AND DEPRESSION: ICD-10-CM

## 2023-07-27 DIAGNOSIS — E66.01 MORBID OBESITY WITH BMI OF 40.0-44.9, ADULT (HCC): ICD-10-CM

## 2023-07-27 DIAGNOSIS — F41.9 ANXIETY AND DEPRESSION: ICD-10-CM

## 2023-07-27 DIAGNOSIS — R73.03 PREDIABETES: ICD-10-CM

## 2023-07-27 DIAGNOSIS — M25.561 CHRONIC PAIN OF BOTH KNEES: ICD-10-CM

## 2023-07-27 DIAGNOSIS — G89.29 CHRONIC PAIN OF BOTH KNEES: ICD-10-CM

## 2023-07-27 PROCEDURE — 96372 THER/PROPH/DIAG INJ SC/IM: CPT

## 2023-07-27 PROCEDURE — 99395 PREV VISIT EST AGE 18-39: CPT | Performed by: NURSE PRACTITIONER

## 2023-07-27 NOTE — PROGRESS NOTES
Patient was in the office for an annual visit today with provider. Patient did not answer last phone call and has history of just not responding after sometime in the program. This nurse asked provider to check with patient regarding of participation in care management program. While in the program patients A1c went from 6.3 to 6 although no weight was lost. She is not a candidate for GLP-1 medication and was on qysmia, but due to financial constraints stopped the medication. Provider let me know that patient felt she could continue on path alone and was no longer interested in care management outreach. Lifestyle modification case closed.

## 2023-07-27 NOTE — ASSESSMENT & PLAN NOTE
She was referred to PT, but is doing home stretches due to cost, and is taking Mobic.  Following with [facility] ortho

## 2023-07-27 NOTE — ASSESSMENT & PLAN NOTE
Patient here for annual well exam. She us UTD on labs and cervical cancer screening. Patient currently reports her anxiety is well controlled on lexapro and trazodone. She is interested in behavioral health at this time. Discussed diet guidelines and exercise goals with pt.

## 2023-07-27 NOTE — PATIENT INSTRUCTIONS
Wellness Visit for Adults   AMBULATORY CARE:   A wellness visit  is when you see your healthcare provider to get screened for health problems. Your healthcare provider will also give you advice on how to stay healthy. Write down your questions so you remember to ask them. Ask your healthcare provider how often you should have a wellness visit. What happens at a wellness visit:  Your healthcare provider will ask about your health, and your family history of health problems. This includes high blood pressure, heart disease, and cancer. He or she will ask if you have symptoms that concern you, if you smoke, and about your mood. You may also be asked about your intake of medicines, supplements, food, and alcohol. Any of the following may be done: Your weight  will be checked. Your height may also be checked so your body mass index (BMI) can be calculated. Your BMI shows if you are at a healthy weight. Your blood pressure  and heart rate will be checked. Your temperature may also be checked. Blood and urine tests  may be done. Blood tests may be done to check your cholesterol levels. Abnormal cholesterol levels increase your risk for heart disease and stroke. You may also need a blood or urine test to check for diabetes if you are at increased risk. Urine tests may be done to look for signs of an infection or kidney disease. A physical exam  includes checking your heartbeat and lungs with a stethoscope. Your healthcare provider may also check your skin to look for sun damage. Screening tests  may be recommended. A screening test is done to check for diseases that may not cause symptoms. The screening tests you may need depend on your age, gender, family history, and lifestyle habits. For example, colorectal screening may be recommended if you are 48years old or older. Screening tests you need if you are a woman:   A Pap smear  is used to screen for cervical cancer.  Pap smears are usually done every 3 to 5 years depending on your age. You may need them more often if you have had abnormal Pap smear test results in the past. Ask your healthcare provider how often you should have a Pap smear. A mammogram  is an x-ray of your breasts to screen for breast cancer. Experts recommend mammograms every 2 years starting at age 48 years. You may need a mammogram at age 52 years or younger if you have an increased risk for breast cancer. Talk to your healthcare provider about when you should start having mammograms and how often you need them. Vaccines you may need:   Get an influenza vaccine  every year. The influenza vaccine protects you from the flu. Several types of viruses cause the flu. The viruses change over time, so new vaccines are made each year. Get a tetanus-diphtheria (Td) booster vaccine  every 10 years. This vaccine protects you against tetanus and diphtheria. Tetanus is a severe infection that may cause painful muscle spasms and lockjaw. Diphtheria is a severe bacterial infection that causes a thick covering in the back of your mouth and throat. Get a human papillomavirus (HPV) vaccine  if you are female and aged 23 to 32 or male 23 to 24 and never received it. This vaccine protects you from HPV infection. HPV is the most common infection spread by sexual contact. HPV may also cause vaginal, penile, and anal cancers. Get a pneumococcal vaccine  if you are aged 72 years or older. The pneumococcal vaccine is an injection given to protect you from pneumococcal disease. Pneumococcal disease is an infection caused by pneumococcal bacteria. The infection may cause pneumonia, meningitis, or an ear infection. Get a shingles vaccine  if you are 60 or older, even if you have had shingles before. The shingles vaccine is an injection to protect you from the varicella-zoster virus. This is the same virus that causes chickenpox.  Shingles is a painful rash that develops in people who had chickenpox or have been exposed to the virus. How to eat healthy:  My Plate is a model for planning healthy meals. It shows the types and amounts of foods that should go on your plate. Fruits and vegetables make up about half of your plate, and grains and protein make up the other half. A serving of dairy is included on the side of your plate. The amount of calories and serving sizes you need depends on your age, gender, weight, and height. Examples of healthy foods are listed below:  Eat a variety of vegetables  such as dark green, red, and orange vegetables. You can also include canned vegetables low in sodium (salt) and frozen vegetables without added butter or sauces. Eat a variety of fresh fruits , canned fruit in 100% juice, frozen fruit, and dried fruit. Include whole grains. At least half of the grains you eat should be whole grains. Examples include whole-wheat bread, wheat pasta, brown rice, and whole-grain cereals such as oatmeal.    Eat a variety of protein foods such as seafood (fish and shellfish), lean meat, and poultry without skin (turkey and chicken). Examples of lean meats include pork leg, shoulder, or tenderloin, and beef round, sirloin, tenderloin, and extra lean ground beef. Other protein foods include eggs and egg substitutes, beans, peas, soy products, nuts, and seeds. Choose low-fat dairy products such as skim or 1% milk or low-fat yogurt, cheese, and cottage cheese. Limit unhealthy fats  such as butter, hard margarine, and shortening. Exercise:  Exercise at least 30 minutes per day on most days of the week. Some examples of exercise include walking, biking, dancing, and swimming. You can also fit in more physical activity by taking the stairs instead of the elevator or parking farther away from stores. Include muscle strengthening activities 2 days each week. Regular exercise provides many health benefits.  It helps you manage your weight, and decreases your risk for type 2 diabetes, heart disease, stroke, and high blood pressure. Exercise can also help improve your mood. Ask your healthcare provider about the best exercise plan for you. General health and safety guidelines:   Do not smoke. Nicotine and other chemicals in cigarettes and cigars can cause lung damage. Ask your healthcare provider for information if you currently smoke and need help to quit. E-cigarettes or smokeless tobacco still contain nicotine. Talk to your healthcare provider before you use these products. Limit alcohol. A drink of alcohol is 12 ounces of beer, 5 ounces of wine, or 1½ ounces of liquor. Lose weight, if needed. Being overweight increases your risk of certain health conditions. These include heart disease, high blood pressure, type 2 diabetes, and certain types of cancer. Protect your skin. Do not sunbathe or use tanning beds. Use sunscreen with a SPF 15 or higher. Apply sunscreen at least 15 minutes before you go outside. Reapply sunscreen every 2 hours. Wear protective clothing, hats, and sunglasses when you are outside. Drive safely. Always wear your seatbelt. Make sure everyone in your car wears a seatbelt. A seatbelt can save your life if you are in an accident. Do not use your cell phone when you are driving. This could distract you and cause an accident. Pull over if you need to make a call or send a text message. Practice safe sex. Use latex condoms if are sexually active and have more than one partner. Your healthcare provider may recommend screening tests for sexually transmitted infections (STIs). Wear helmets, lifejackets, and protective gear. Always wear a helmet when you ride a bike or motorcycle, go skiing, or play sports that could cause a head injury. Wear protective equipment when you play sports. Wear a lifejacket when you are on a boat or doing water sports.     © Copyright [employer] 2022 Information is for End User's use only and may not be sold, redistributed or otherwise used for commercial purposes. The above information is an  only. It is not intended as medical advice for individual conditions or treatments. Talk to your doctor, nurse or pharmacist before following any medical regimen to see if it is safe and effective for you.

## 2023-07-27 NOTE — ASSESSMENT & PLAN NOTE
Patient previously working with complex care coordinator but is no longer following. She reports she is working on diet and exercise.

## 2023-07-27 NOTE — ASSESSMENT & PLAN NOTE
Goal to consume 500 to 1,000 fewer calories per day for a 1-2 lbs weight loss per week. Increase exercise to 30 min, 5 times a week as tolerated for a goal of 150 mins a week. Calorie tracking apps such as myfitness pal can be helpful for keeping track of calorie intake. Decrease simple carbohydrates (white bread, pasta, white rice), and increasing vegetables, fruit, and protein. Increase water.

## 2023-07-27 NOTE — PROGRESS NOTES
ADULT ANNUAL [facility]    NAME: Ludy Bates  AGE: 34 y.o. SEX: female  : 1990     DATE: 2023     Assessment and Plan:     Problem List Items Addressed This Visit        Other    Anxiety and depression     Patient reports anxiety and depression is well controlled on trazodone and lexapro. Relevant Orders    Ambulatory Referral to [address] [place] [address]    Morbid obesity with BMI of 40.0-44.9, adult ([facility])     Goal to consume 500 to 1,000 fewer calories per day for a 1-2 lbs weight loss per week. Increase exercise to 30 min, 5 times a week as tolerated for a goal of 150 mins a week. Calorie tracking apps such as myfitness pal can be helpful for keeping track of calorie intake. Decrease simple carbohydrates (white bread, pasta, white rice), and increasing vegetables, fruit, and protein. Increase water. Prediabetes     Patient previously working with complex care coordinator but is no longer following. She reports she is working on diet and exercise. Well adult exam - Primary     Patient here for annual well exam. She us UTD on labs and cervical cancer screening. Patient currently reports her anxiety is well controlled on lexapro and trazodone. She is interested in behavioral health at this time. Discussed diet guidelines and exercise goals with pt. Immunizations and preventive care screenings were discussed with patient today. Appropriate education was printed on patient's after visit summary. Counseling:  • Alcohol/drug use: discussed moderation in alcohol intake, the recommendations for healthy alcohol use, and avoidance of illicit drug use. • Dental Health: discussed importance of regular tooth brushing, flossing, and dental visits.   • Injury prevention: discussed safety/seat belts, safety helmets, smoke detectors, carbon dioxide detectors, and smoking near bedding or upholstery. • Sexual health: discussed sexually transmitted diseases, partner selection, use of condoms, avoidance of unintended pregnancy, and contraceptive alternatives. · Exercise: the importance of regular exercise/physical activity was discussed. Recommend exercise 3-5 times per week for at least 30 minutes. Depression Screening and Follow-up Plan: Patient advised to follow-up with PCP for further management. On Lexapro        Return for Annual physical.     Chief Complaint:     Chief Complaint   Patient presents with   • Physical Exam      History of Present Illness:     Adult Annual Physical   Patient here for a comprehensive physical exam. The patient reports no problems. Diet and Physical Activity  · Diet/Nutrition: well balanced diet. · Exercise: 3-4 times a week on average. Depression Screening  PHQ-2/9 Depression Screening    Little interest or pleasure in doing things: 0 - not at all  Feeling down, depressed, or hopeless: 0 - not at all  Trouble falling or staying asleep, or sleeping too much: 1 - several days  Feeling tired or having little energy: 1 - several days  Poor appetite or overeatin - several days  Feeling bad about yourself - or that you are a failure or have let yourself or your family down: 0 - not at all  Trouble concentrating on things, such as reading the newspaper or watching television: 1 - several days  Moving or speaking so slowly that other people could have noticed. Or the opposite - being so fidgety or restless that you have been moving around a lot more than usual: 0 - not at all  Thoughts that you would be better off dead, or of hurting yourself in some way: 0 - not at all  PHQ-9 Score: 4   PHQ-9 Interpretation: No or Minimal depression        General Health  · Sleep: gets 7-8 hours of sleep on average. · Hearing: normal - bilateral.  · Vision: most recent eye exam <1 year ago and wears glasses.    · Dental: regular dental visits. /GYN Health  · Last menstrual period: On Depo  · Contraceptive method: injectable contraception. · History of STDs?: no.     Review of Systems:     Review of Systems   Constitutional: Negative. HENT: Negative. Eyes: Negative. Respiratory: Negative. Cardiovascular: Negative. Gastrointestinal: Negative. Genitourinary: Negative. Musculoskeletal: Negative. Skin: Negative. Neurological: Negative. Hematological: Negative. Psychiatric/Behavioral: Positive for dysphoric mood and self-injury. Negative for agitation, behavioral problems, confusion, decreased concentration, hallucinations, sleep disturbance and suicidal ideas. The patient is not nervous/anxious and is not hyperactive.        Past Medical History:     Past Medical History:   Diagnosis Date   • Allergic within the last 3 years    Batrium, Wellibutrin, seasonal   • Anxiety 2011   • Arthritis    • Depression    • Migraines       Past Surgical History:     Past Surgical History:   Procedure Laterality Date   • SHOULDER ARTHROSCOPY Left    • SHOULDER SURGERY     • WISDOM TOOTH EXTRACTION     • WISDOM TOOTH EXTRACTION        Social History:     Social History     Socioeconomic History   • Marital status: /Civil Union     Spouse name: None   • Number of children: None   • Years of education: None   • Highest education level: None   Occupational History   • None   Tobacco Use   • Smoking status: Never   • Smokeless tobacco: Never   • Tobacco comments:     Never used   Vaping Use   • Vaping Use: Never used   Substance and Sexual Activity   • Alcohol use: Not Currently   • Drug use: Never   • Sexual activity: Yes     Partners: Female     Birth control/protection: Injection     Comment: Depo shot   Other Topics Concern   • None   Social History Narrative   • None     Social Determinants of Health     Financial Resource Strain: Not on file   Food Insecurity: Not on file   Transportation Needs: Not on file   Physical Activity: Not on file   Stress: Not on file   Social Connections: Not on file   Intimate Partner Violence: Not on file   Housing Stability: Not on file      Family History:     Family History   Problem Relation Age of Onset   • Heart failure Mother    • Asthma Mother    • Arthritis Mother         RA   • Thyroid cancer Father 36   • Heart disease Father    • Thyroid disease Father    • Cancer Father         thyroid   • Mental illness Father    • No Known Problems Maternal Grandmother    • Hypertension Maternal Grandfather    • Diabetes Maternal Grandfather    • Diabetes Paternal Grandmother    • Hypertension Paternal Grandmother    • Pancreatic cancer Paternal Grandmother 61   • Mental illness Paternal Grandmother    • Depression Paternal Grandmother    • Stroke Paternal Grandmother    • Arthritis Paternal Grandmother    • Cancer Paternal Grandmother         Pancreatic   • Anxiety disorder Paternal Grandmother    • Suicide Attempts Paternal Grandmother    • Diabetes Paternal Grandfather    • No Known Problems Maternal Aunt    • BRCA2 Positive Paternal Aunt    • Thyroid disease Paternal Aunt    • Breast cancer Paternal Aunt         late 45s, early 46s   • BRCA2 Positive Paternal Aunt    • Breast cancer Paternal Trever Mcnair         late 45s, early 46s   • BRCA2 Positive Paternal Trever Mcnair    • Breast cancer Paternal Trever Mcnair         late 45s, early 46s   • Thyroid cancer Paternal Aunt 44   • Thyroid disease Paternal Trever Mcnair    • Cancer Paternal Aunt         Thyroid   • Breast cancer Paternal Aunt    • BRCA2 Positive Cousin    • Breast cancer Cousin         late 45s, early 46s   • BRCA2 Positive Cousin    • Breast cancer Cousin         late 45s, early 46s   • Breast cancer Cousin    • Breast cancer Cousin    • Breast cancer Cousin    • Breast cancer additional onset Neg Hx    • BRCA2 Negative Neg Hx    • BRCA1 Positive Neg Hx    • BRCA 1/2 Neg Hx    • BRCA1 Negative Neg Hx    • Ovarian cancer Neg Hx    • Endometrial cancer Neg Hx    • Colon cancer Neg Hx       Current Medications:     Current Outpatient Medications   Medication Sig Dispense Refill   • escitalopram (LEXAPRO) 20 mg tablet Take 1 tablet (20 mg total) by mouth daily 90 tablet 3   • medroxyPROGESTERone (DEPO-PROVERA) 150 mg/mL injection INJECT 150 MG IM ONCE,INSTR:BRING TO OFFICE FOR ADMINISTRATION     • meloxicam (MOBIC) 15 mg tablet Take 15 mg by mouth daily     • oxybutynin (DITROPAN) 5 mg tablet Take 1 tablet (5 mg total) by mouth in the morning 90 tablet 3   • traZODone (DESYREL) 100 mg tablet Take 1 tablet (100 mg total) by mouth daily at bedtime 90 tablet 1     No current facility-administered medications for this visit. Allergies: Allergies   Allergen Reactions   • Bupropion Hives   • Sulfamethoxazole-Trimethoprim Hives      Physical Exam:     /77 (BP Location: Left arm, Patient Position: Sitting, Cuff Size: Large)   Pulse 95   Ht 5' 2" (1.575 m)   Wt 106 kg (234 lb 6.4 oz)   LMP  (LMP Unknown)   SpO2 97%   BMI 42.87 kg/m²     Physical Exam  Vitals and nursing note reviewed. Constitutional:       General: She is not in acute distress. Appearance: Normal appearance. She is not ill-appearing. HENT:      Head: Normocephalic and atraumatic. Right Ear: Tympanic membrane, ear canal and external ear normal. There is no impacted cerumen. Left Ear: Tympanic membrane, ear canal and external ear normal. There is no impacted cerumen. Nose: Nose normal.   Eyes:      General:         Right eye: No discharge. Left eye: No discharge. Conjunctiva/sclera: Conjunctivae normal.   Cardiovascular:      Rate and Rhythm: Normal rate and regular rhythm. Heart sounds: Normal heart sounds. Pulmonary:      Effort: Pulmonary effort is normal.      Breath sounds: Normal breath sounds. Abdominal:      General: Bowel sounds are normal.      Palpations: Abdomen is soft. Musculoskeletal:         General: Normal range of motion.       Cervical back: Normal range of motion. Right lower leg: No edema. Left lower leg: No edema. Lymphadenopathy:      Cervical: No cervical adenopathy. Skin:     General: Skin is warm and dry. Neurological:      Mental Status: She is alert and oriented to person, place, and time. Psychiatric:         Mood and Affect: Mood normal.         Behavior: Behavior normal.         Thought Content:  Thought content normal.         Judgment: Judgment normal.          Rita Hathaway, [address] WITH [facility]

## 2023-08-02 ENCOUNTER — CLINICAL SUPPORT (OUTPATIENT)
Dept: FAMILY MEDICINE CLINIC | Facility: CLINIC | Age: 33
End: 2023-08-02

## 2023-08-02 VITALS
WEIGHT: 229.2 LBS | DIASTOLIC BLOOD PRESSURE: 70 MMHG | BODY MASS INDEX: 42.18 KG/M2 | HEIGHT: 62 IN | SYSTOLIC BLOOD PRESSURE: 120 MMHG

## 2023-08-02 DIAGNOSIS — Z30.019 ENCOUNTER FOR FEMALE BIRTH CONTROL: Primary | ICD-10-CM

## 2023-08-02 PROCEDURE — 96372 THER/PROPH/DIAG INJ SC/IM: CPT

## 2023-08-02 RX ORDER — MEDROXYPROGESTERONE ACETATE 150 MG/ML
150 INJECTION, SUSPENSION INTRAMUSCULAR ONCE
Status: COMPLETED | OUTPATIENT
Start: 2023-08-02 | End: 2023-08-02

## 2023-08-02 RX ADMIN — MEDROXYPROGESTERONE ACETATE 150 MG: 150 INJECTION, SUSPENSION INTRAMUSCULAR at 13:39

## 2023-08-04 ENCOUNTER — OFFICE VISIT (OUTPATIENT)
Dept: SURGICAL ONCOLOGY | Facility: CLINIC | Age: 33
End: 2023-08-04
Payer: COMMERCIAL

## 2023-08-04 VITALS
HEART RATE: 79 BPM | TEMPERATURE: 97.5 F | DIASTOLIC BLOOD PRESSURE: 77 MMHG | WEIGHT: 231 LBS | SYSTOLIC BLOOD PRESSURE: 118 MMHG | HEIGHT: 62 IN | BODY MASS INDEX: 42.51 KG/M2 | RESPIRATION RATE: 16 BRPM | OXYGEN SATURATION: 98 %

## 2023-08-04 DIAGNOSIS — Z15.09 MONOALLELIC MUTATION OF CHEK2 GENE IN FEMALE PATIENT: ICD-10-CM

## 2023-08-04 DIAGNOSIS — Z80.8 FAMILY HISTORY OF THYROID CANCER: ICD-10-CM

## 2023-08-04 DIAGNOSIS — Z15.89 MONOALLELIC MUTATION OF CHEK2 GENE IN FEMALE PATIENT: ICD-10-CM

## 2023-08-04 DIAGNOSIS — Z15.01 MONOALLELIC MUTATION OF CHEK2 GENE IN FEMALE PATIENT: ICD-10-CM

## 2023-08-04 DIAGNOSIS — Z15.02 MONOALLELIC MUTATION OF CHEK2 GENE IN FEMALE PATIENT: ICD-10-CM

## 2023-08-04 DIAGNOSIS — Z91.89 INCREASED RISK OF BREAST CANCER: Primary | ICD-10-CM

## 2023-08-04 DIAGNOSIS — Z80.3 FAMILY HISTORY OF BREAST CANCER: ICD-10-CM

## 2023-08-04 PROCEDURE — 99213 OFFICE O/P EST LOW 20 MIN: CPT | Performed by: NURSE PRACTITIONER

## 2023-08-04 NOTE — PROGRESS NOTES
Surgical Oncology Follow Up       [facility]  [facility] [place]  [place] [address]    Gamaliel Miller  1990  [id number]      Chief Complaint   Patient presents with   • Follow-up       Assessment/Plan:  1. Increased risk of breast cancer  - 6 mo f/u visit  - MRI breast bilateral w and wo contrast w cad; Future    2. Family history of breast cancer  - MRI breast bilateral w and wo contrast w cad; Future    3. Monoallelic mutation of CHEK2 gene in female patient  - US thyroid; Future  - MRI breast bilateral w and wo contrast w cad; Future    4. Family history of thyroid cancer  - US thyroid; Future      Discussion/Summary: Patient is a 19-year-old female that carries a CHEK2 mutation and has a family history of breast cancer. I have recommended that she start annual breast MRI at this time. She is agreeable, order placed. There are no worrisome findings on her clinical exam today. We also discussed the increased risk of thyroid cancer with the CHEK2 mutation. Her father and paternal aunt have been diagnosed with thyroid cancer. I recommended a baseline thyroid ultrasound and patient is agreeable. Assuming all of her imaging is benign,  I will see her back in 6 months or sooner should the need arise. She was instructed to contact me with any changes or concerns in the interim. History of Present Illness: Presents today for a follow-up visit. Her genetic testing revealed the same familial CHEK2 mutation. Patient notices no changes on herself breast exam.  She reports no further changes in her family history. Denies difficulty swallowing or voice changes. Result: Positive      Variant 1:   CHEK2 c.277del (p. 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   She underwent genetic testing which revealed the CHEK2 mutation. Review of Systems:  Review of Systems   Constitutional: Negative for chills, fatigue and fever. HENT: Negative for trouble swallowing and voice change. Respiratory: Negative for cough and shortness of breath. Cardiovascular: Negative for chest pain. Hematological: Negative for adenopathy. Psychiatric/Behavioral: Negative for confusion.        Patient Active Problem List   Diagnosis   • Anxiety and depression   • Overactive bladder   • Primary insomnia   • Family history of breast cancer   • Elevated LDL cholesterol level   • Morbid obesity with BMI of 40.0-44.9, adult ([facility])   • Prediabetes   • Increased risk of breast cancer   • Family history of gene mutation   • Chronic pain of both knees   • Well adult exam     Past Medical History:   Diagnosis Date   • Allergic within the last 3 years    Batrium, Wellibutrin, seasonal   • Anxiety 2011   • Arthritis    • Depression    • Migraines      Past Surgical History:   Procedure Laterality Date   • SHOULDER ARTHROSCOPY Left    • SHOULDER SURGERY     • WISDOM TOOTH EXTRACTION     • WISDOM TOOTH EXTRACTION       Family History   Problem Relation Age of Onset   • Heart failure Mother    • Asthma Mother    • Arthritis Mother         RA   • Thyroid cancer Father 36   • Heart disease Father    • Thyroid disease Father    • Cancer Father         thyroid   • Mental illness Father    • No Known Problems Maternal Grandmother    • Hypertension Maternal Grandfather    • Diabetes Maternal Grandfather    • Diabetes Paternal Grandmother    • Hypertension Paternal Grandmother    • Pancreatic cancer Paternal Grandmother 61   • Mental illness Paternal Grandmother    • Depression Paternal Grandmother    • Stroke Paternal Grandmother    • Arthritis Paternal Grandmother    • Cancer Paternal Grandmother         Pancreatic   • Anxiety disorder Paternal Grandmother    • Suicide Attempts Paternal Grandmother    • Diabetes Paternal Grandfather    • No Known Problems Maternal Aunt    • BRCA2 Positive Paternal Aunt    • Thyroid disease Paternal Aunt    • Breast cancer Paternal Aunt         late 45s, early 46s   • BRCA2 Positive Paternal Aunt    • Breast cancer Paternal Aunt         late 45s, early 46s   • BRCA2 Positive Paternal Aunt    • Breast cancer Paternal Aunt         late 45s, early 46s   • Thyroid cancer Paternal Aunt 44   • Thyroid disease Paternal Aunt    • Cancer Paternal Aunt         Thyroid   • Breast cancer Paternal Aunt    • BRCA2 Positive Cousin    • Breast cancer Cousin         late 45s, early 46s   • BRCA2 Positive Cousin    • Breast cancer Cousin         late 45s, early 46s   • Breast cancer Cousin    • Breast cancer Cousin    • Breast cancer Cousin    • Breast cancer additional onset Neg Hx    • BRCA2 Negative Neg Hx    • BRCA1 Positive Neg Hx    • BRCA 1/2 Neg Hx    • BRCA1 Negative Neg Hx    • Ovarian cancer Neg Hx    • Endometrial cancer Neg Hx    • Colon cancer Neg Hx      Social History     Socioeconomic History   • Marital status: /Civil Union     Spouse name: Not on file   • Number of children: Not on file   • Years of education: Not on file   • Highest education level: Not on file   Occupational History   • Not on file   Tobacco Use   • Smoking status: Never   • Smokeless tobacco: Never   • Tobacco comments:     Never used   Vaping Use   • Vaping Use: Never used   Substance and Sexual Activity   • Alcohol use: Not Currently   • Drug use: Never   • Sexual activity: Yes     Partners: Female     Birth control/protection: Injection     Comment: Depo shot   Other Topics Concern   • Not on file   Social History Narrative   • Not on file     Social Determinants of Health     Financial Resource Strain: Not on file   Food Insecurity: Not on file   Transportation Needs: Not on file   Physical Activity: Not on file   Stress: Not on file   Social Connections: Not on file   Intimate Partner Violence: Not on file   Housing Stability: Not on file       Current Outpatient Medications:   •  escitalopram (LEXAPRO) 20 mg tablet, Take 1 tablet (20 mg total) by mouth daily, Disp: 90 tablet, Rfl: 3  •  meloxicam (MOBIC) 15 mg tablet, Take 15 mg by mouth daily, Disp: , Rfl:   •  oxybutynin (DITROPAN) 5 mg tablet, Take 1 tablet (5 mg total) by mouth in the morning, Disp: 90 tablet, Rfl: 3  •  traZODone (DESYREL) 100 mg tablet, Take 1 tablet (100 mg total) by mouth daily at bedtime, Disp: 90 tablet, Rfl: 1  Allergies   Allergen Reactions   • Bupropion Hives   • Sulfamethoxazole-Trimethoprim Hives     Vitals:    08/04/23 0927   BP: 118/77   Pulse: 79   Resp: 16   Temp: 97.5 °F (36.4 °C)   SpO2: 98%       Physical Exam  Vitals reviewed. Constitutional:       Appearance: Normal appearance. HENT:      Head: Normocephalic and atraumatic. Neck:      Thyroid: No thyroid mass or thyromegaly. Pulmonary:      Effort: Pulmonary effort is normal.   Chest:   Breasts:     Right: No swelling, bleeding, inverted nipple, mass, nipple discharge, skin change or tenderness. Left: No swelling, bleeding, inverted nipple, mass, nipple discharge, skin change or tenderness. Lymphadenopathy:      Cervical: No cervical adenopathy. Upper Body:      Right upper body: No supraclavicular or axillary adenopathy. Left upper body: No supraclavicular or axillary adenopathy. Neurological:      General: No focal deficit present. Mental Status: She is alert and oriented to person, place, and time. Psychiatric:         Mood and Affect: Mood normal.         Advance Care Planning/Advance Directives:  Discussed disease status and treatment goals with the patient.

## 2023-08-10 ENCOUNTER — HOSPITAL ENCOUNTER (OUTPATIENT)
Dept: ULTRASOUND IMAGING | Facility: HOSPITAL | Age: 33
Discharge: HOME | End: 2023-08-10
Payer: COMMERCIAL

## 2023-08-10 DIAGNOSIS — Z15.09 MONOALLELIC MUTATION OF CHEK2 GENE IN FEMALE PATIENT: ICD-10-CM

## 2023-08-10 DIAGNOSIS — Z15.01 MONOALLELIC MUTATION OF CHEK2 GENE IN FEMALE PATIENT: ICD-10-CM

## 2023-08-10 DIAGNOSIS — Z80.8 FAMILY HISTORY OF THYROID CANCER: ICD-10-CM

## 2023-08-10 DIAGNOSIS — Z15.02 MONOALLELIC MUTATION OF CHEK2 GENE IN FEMALE PATIENT: ICD-10-CM

## 2023-08-10 DIAGNOSIS — Z15.89 MONOALLELIC MUTATION OF CHEK2 GENE IN FEMALE PATIENT: ICD-10-CM

## 2023-08-10 PROCEDURE — 76536 US EXAM OF HEAD AND NECK: CPT

## 2023-08-15 ENCOUNTER — OFFICE VISIT (OUTPATIENT)
Age: 33
End: 2023-08-15

## 2023-08-15 VITALS
TEMPERATURE: 98.2 F | OXYGEN SATURATION: 97 % | DIASTOLIC BLOOD PRESSURE: 72 MMHG | HEIGHT: 62 IN | WEIGHT: 228.2 LBS | BODY MASS INDEX: 41.99 KG/M2 | HEART RATE: 71 BPM | SYSTOLIC BLOOD PRESSURE: 126 MMHG

## 2023-08-15 DIAGNOSIS — F07.81 POST-CONCUSSION SYNDROME: ICD-10-CM

## 2023-08-15 DIAGNOSIS — R11.0 NAUSEA: Primary | ICD-10-CM

## 2023-08-15 PROCEDURE — 99213 OFFICE O/P EST LOW 20 MIN: CPT | Performed by: NURSE PRACTITIONER

## 2023-08-15 RX ORDER — MEDROXYPROGESTERONE ACETATE 150 MG/ML
150 INJECTION, SUSPENSION INTRAMUSCULAR
COMMUNITY
End: 2023-08-24

## 2023-08-15 RX ORDER — ONDANSETRON 4 MG/1
4 TABLET, FILM COATED ORAL EVERY 12 HOURS PRN
Qty: 10 TABLET | Refills: 0 | Status: SHIPPED | OUTPATIENT
Start: 2023-08-15 | End: 2023-08-24 | Stop reason: SDUPTHER

## 2023-08-15 RX ORDER — ONDANSETRON 4 MG/1
4 TABLET, FILM COATED ORAL EVERY 12 HOURS PRN
Qty: 10 TABLET | Refills: 0 | Status: SHIPPED | OUTPATIENT
Start: 2023-08-15 | End: 2023-08-15

## 2023-08-15 NOTE — LETTER
August 15, 2023     Patient: Qamar Washington  YOB: 1990  Date of Visit: 8/15/2023      To Whom it May Concern:    Lucas Sicard is under my professional care. Eduarda Sharif was seen in my office on 8/15/2023. Eduarda Sharif may return to work on 08/21/2023 . If you have any questions or concerns, please don't hesitate to call.          Sincerely,          CLARENCE March        CC: No Recipients

## 2023-08-15 NOTE — ASSESSMENT & PLAN NOTE
Patient reporting concussion which occurred 3 days ago. Patient reports she was seen in the ER at [facility], records will be requested. Patient reports she had a negative CT in the hospital.  She reports she continues with occasional nausea, brain fog which is improving, fatigue, head pain at the site of impact, and slight sensitivity to light. Patient educated on expected course of healing with postconcussion syndrome. Most patients are feeling significant improvement within 10 days, with almost all patients completely better within 1 month. Patient advised on cognitive and physical rest.  Note provided for work. Patient to follow back up in 1 week. ER precautions discussed.

## 2023-08-15 NOTE — ASSESSMENT & PLAN NOTE
Patient reporting occasional nausea related to postconcussion syndrome. Will send Zofran to help with symptoms at this time. Discussed side effects and use with patient. Patient verbalized understanding.

## 2023-08-15 NOTE — PATIENT INSTRUCTIONS
Post Concussion Syndrome   WHAT YOU NEED TO KNOW:   Post-concussion syndrome (PCS) is a group of symptoms that affect your body, thinking, and behavior. PCS develops 10 to 14 days after a concussion and can last for weeks to years. DISCHARGE INSTRUCTIONS:   Have someone call your local emergency number (911 in the [address]) if:   You have a seizure. You have trouble breathing. You are not responding, or you cannot be woken. Return to the emergency department if:   You have a sudden headache that seems different or much worse than your usual headaches. You cannot stop vomiting. You have sudden changes in your vision, or your pupils are different sizes. Call your doctor if:   You feel depressed. You have nausea or are vomiting. You have trouble concentrating. You have trouble speaking or thinking. Your symptoms get worse. You have questions or concerns about your condition or care. Medicines: You may  need any of the following:  Acetaminophen  decreases pain and fever. It is available without a doctor's order. Ask how much to take and how often to take it. Follow directions. Read the labels of all other medicines you are using to see if they also contain acetaminophen, or ask your doctor or pharmacist. Acetaminophen can cause liver damage if not taken correctly. NSAIDs  help decrease swelling and pain or fever. This medicine is available with or without a doctor's order. NSAIDs can cause stomach bleeding or kidney problems in certain people. If you take blood thinner medicine, always ask your healthcare provider if NSAIDs are safe for you. Always read the medicine label and follow directions. Antidepressants  may be given for depression or sleep problems. Migraine medicines  may be given for migraine headaches.     Prevent PCS:   Follow your treatment plan after a concussion to help you heal.  You will heal more quickly if you follow your healthcare provider's instructions. Make your home safe. Home safety measures can help prevent head injuries that could lead to a concussion. Install handrails for every staircase. Put soft bumpers on furniture edges and corners. Secure furniture, such as dressers and bookcases so they do not fall over. Always wear a seatbelt in the car. A seatbelt helps decrease your risk for a head injury if you are in a car accident. Wear protective sports equipment that fits properly. Helmets help decrease your risk for a serious brain injury. Talk to your provider about other ways that you can decrease your risk for a concussion if you play sports. Ask for more information about sports concussions. Manage your symptoms:   Rest from physical and mental activities as directed. Mental activities need you to think, concentrate, and pay attention. Rest will help you recover from your concussion. Ask your healthcare provider when you can return to school and other daily activities. Go to therapy as directed. A cognitive behavioral therapist teaches you skills to help with any thinking and behavior problems you may have. An occupational therapist teaches you skills to help with daily activities. Do not participate in sports or physical activities until your provider says it is okay. These activities could make your symptoms worse or lead to another concussion. Your provider will tell you when it is okay to return to sports or physical activities. Follow up with your doctor or specialist as directed: Your doctor may refer you to a psychiatrist, a neurologist, or a substance abuse counselor. Write down your questions so you remember to ask them during your visits. For more information:   [employer]  [address] [place] [address] , [address]  Phone: [address]  Phone: [phone]  Web Address: [employer]. Inoapps    © Copyright Sara Brunner 2022 Information is for End User's use only and may not be sold, redistributed or otherwise used for commercial purposes. The above information is an  only. It is not intended as medical advice for individual conditions or treatments. Talk to your doctor, nurse or pharmacist before following any medical regimen to see if it is safe and effective for you.

## 2023-08-15 NOTE — PROGRESS NOTES
Name: Kelley Francois      : 1990      MRN: [id number]  Encounter Provider: CLARENCE Thibodeaux  Encounter Date: 8/15/2023   Encounter department: [address] [place] [address] WITH [facility]    Assessment & Plan     1. Nausea  Assessment & Plan:  Patient reporting occasional nausea related to postconcussion syndrome. Will send Zofran to help with symptoms at this time. Discussed side effects and use with patient. Patient verbalized understanding. Orders:  -     ondansetron (ZOFRAN) 4 mg tablet; Take 1 tablet (4 mg total) by mouth every 12 (twelve) hours as needed for nausea or vomiting for up to 20 doses    2. Post-concussion syndrome  Assessment & Plan:  Patient reporting concussion which occurred 3 days ago. Patient reports she was seen in the ER at [facility], records will be requested. Patient reports she had a negative CT in the hospital.  She reports she continues with occasional nausea, brain fog which is improving, fatigue, head pain at the site of impact, and slight sensitivity to light. Patient educated on expected course of healing with postconcussion syndrome. Most patients are feeling significant improvement within 10 days, with almost all patients completely better within 1 month. Patient advised on cognitive and physical rest.  Note provided for work. Patient to follow back up in 1 week. ER precautions discussed. Orders:  -     ondansetron (ZOFRAN) 4 mg tablet; Take 1 tablet (4 mg total) by mouth every 12 (twelve) hours as needed for nausea or vomiting for up to 20 doses           Subjective      Patient here today for ER F/U, concussion. Patient reports she fell in the shower Saturday night as she was stepping into the shower her feet slipped and she hit the occipital side of her head. She denies losing conciousness, vomiting at time, or dizziness. She reports she had a negative CT at [facility].   Patient reporting postconcussive symptoms of  head pain at site of impact, fatigue,light sensitivity, occasional nausea, and mild brain. Patient does report her symptoms were starting to improve, however she states she started to "overdo it "at home and her symptoms were slightly worse today. Patient has been avoiding screen time, and is currently home from work due to screen activity at work. Patient notes her brain fog has improved. Review of Systems   Constitutional: Positive for fatigue. Negative for appetite change, chills, diaphoresis, fever and unexpected weight change. HENT: Negative. Eyes: Positive for photophobia. Negative for pain, discharge, redness, itching and visual disturbance. Respiratory: Negative. Cardiovascular: Negative. Gastrointestinal: Positive for nausea. Negative for abdominal distention, abdominal pain, anal bleeding, blood in stool, constipation, diarrhea, rectal pain and vomiting. Genitourinary: Negative. Musculoskeletal: Negative. Skin: Negative. Neurological: Positive for headaches. Hematological: Negative. Current Outpatient Medications on File Prior to Visit   Medication Sig   • escitalopram (LEXAPRO) 20 mg tablet Take 1 tablet (20 mg total) by mouth daily   • medroxyPROGESTERone (DEPO-PROVERA) 150 mg/mL injection Inject 150 mg into a muscle every 3 (three) months   • meloxicam (MOBIC) 15 mg tablet Take 15 mg by mouth daily   • oxybutynin (DITROPAN) 5 mg tablet Take 1 tablet (5 mg total) by mouth in the morning   • traZODone (DESYREL) 100 mg tablet Take 1 tablet (100 mg total) by mouth daily at bedtime       Objective     /72 (BP Location: Right arm, Patient Position: Sitting, Cuff Size: Standard)   Pulse 71   Temp 98.2 °F (36.8 °C)   Ht 5' 2" (1.575 m)   Wt 104 kg (228 lb 3.2 oz)   SpO2 97%   BMI 41.74 kg/m²     Physical Exam  Vitals and nursing note reviewed. Constitutional:       General: She is not in acute distress.      Appearance: Normal appearance. HENT:      Head: Normocephalic and atraumatic. Right Ear: External ear normal.      Left Ear: External ear normal.      Nose: Nose normal.   Eyes:      General: No visual field deficit. Right eye: No discharge. Left eye: No discharge. Conjunctiva/sclera: Conjunctivae normal.   Cardiovascular:      Rate and Rhythm: Normal rate and regular rhythm. Heart sounds: Normal heart sounds. No murmur heard. Pulmonary:      Effort: Pulmonary effort is normal.      Breath sounds: Normal breath sounds. Abdominal:      General: Bowel sounds are normal.      Palpations: Abdomen is soft. Musculoskeletal:         General: Normal range of motion. Cervical back: Normal range of motion. Right lower leg: No edema. Left lower leg: No edema. Lymphadenopathy:      Cervical: No cervical adenopathy. Skin:     General: Skin is warm and dry. Neurological:      Mental Status: She is alert and oriented to person, place, and time. Cranial Nerves: Cranial nerves 2-12 are intact. No cranial nerve deficit, dysarthria or facial asymmetry. Sensory: Sensation is intact. Motor: Motor function is intact. No weakness. Coordination: Coordination is intact. Romberg sign negative. Heel to Mariea Harvard Test normal.      Gait: Gait is intact. Gait normal.      Deep Tendon Reflexes: Reflexes are normal and symmetric. Psychiatric:         Mood and Affect: Mood normal.         Behavior: Behavior normal.         Thought Content:  Thought content normal.         Judgment: Judgment normal.       CLARENCE Mccauley

## 2023-08-23 ENCOUNTER — RA CDI HCC (OUTPATIENT)
Dept: OTHER | Facility: HOSPITAL | Age: 33
End: 2023-08-23

## 2023-08-23 DIAGNOSIS — F07.81 POST-CONCUSSION SYNDROME: Primary | ICD-10-CM

## 2023-08-23 NOTE — PROGRESS NOTES
F32.A Depression- found in active problem list - Can Depression be further classified using more specific code    [address] coding opportunities          Chart Reviewed number of suggestions sent to Provider: 1     Patients Insurance        Commercial Insurance: Mahesh Camacho

## 2023-08-24 ENCOUNTER — OFFICE VISIT (OUTPATIENT)
Dept: FAMILY MEDICINE CLINIC | Facility: CLINIC | Age: 33
End: 2023-08-24

## 2023-08-24 VITALS
SYSTOLIC BLOOD PRESSURE: 110 MMHG | BODY MASS INDEX: 42.4 KG/M2 | HEIGHT: 62 IN | DIASTOLIC BLOOD PRESSURE: 81 MMHG | WEIGHT: 230.4 LBS | HEART RATE: 99 BPM | OXYGEN SATURATION: 99 %

## 2023-08-24 DIAGNOSIS — F07.81 POST-CONCUSSION SYNDROME: Primary | ICD-10-CM

## 2023-08-24 DIAGNOSIS — R11.0 NAUSEA: ICD-10-CM

## 2023-08-24 DIAGNOSIS — S16.1XXD STRAIN OF NECK MUSCLE, SUBSEQUENT ENCOUNTER: ICD-10-CM

## 2023-08-24 PROBLEM — S09.90XA HEAD INJURY: Status: ACTIVE | Noted: 2023-08-13

## 2023-08-24 PROBLEM — S16.1XXA NECK STRAIN: Status: ACTIVE | Noted: 2023-08-13
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
## 2023-08-24 PROCEDURE — 99214 OFFICE O/P EST MOD 30 MIN: CPT | Performed by: NURSE PRACTITIONER

## 2023-08-24 RX ORDER — MEDROXYPROGESTERONE ACETATE 150 MG/ML
150 INJECTION, SUSPENSION INTRAMUSCULAR
COMMUNITY
Start: 2023-08-13 | End: 2023-08-24

## 2023-08-24 RX ORDER — CYCLOBENZAPRINE HCL 10 MG
10 TABLET ORAL 3 TIMES DAILY PRN
Qty: 30 TABLET | Refills: 0
Start: 2023-08-24

## 2023-08-24 RX ORDER — ONDANSETRON 4 MG/1
4 TABLET, FILM COATED ORAL EVERY 12 HOURS PRN
Qty: 10 TABLET | Refills: 0 | Status: SHIPPED | OUTPATIENT
Start: 2023-08-24

## 2023-08-24 NOTE — PROGRESS NOTES
Name: Linda Calderon      : 1990      MRN: [id number]  Encounter Provider: CLARENCE Estrada  Encounter Date: 2023   Encounter department: [address] [place] [address]. Post-concussion syndrome  Assessment & Plan:  Patient with reported concussion on 2023 due to a fall in her bathtub. She was seen at the [facility] ER and had a negative brain/spine/cervical CT scan. Patient is here to follow back up due to continued fatigue, brain fog, and headaches. She reports headaches cause her nausea, sensitivity to light, and sensitivity to loud noises. Patient returned back to work on 2023 and reports she had to leave early due to loud noises, and has struggled with stress and computer screens triggering headaches. Patient was referred to the [facility] concussion clinic, but has not called to schedule and appointment. Patient was given number again today and reports she will call today for an appointment. Patient was given a note for the next two day off of work, so she can make time for the concussion clinic. Discussed normal time frames for concussion symptoms and gave reassurance. Patient given a refill on zofran for nausea. She denies tremors, weakness, seizures. Orders:  -     ondansetron (ZOFRAN) 4 mg tablet; Take 1 tablet (4 mg total) by mouth every 12 (twelve) hours as needed for nausea or vomiting for up to 20 doses    2. Strain of neck muscle, subsequent encounter  Assessment & Plan:  Patient was seen at [facility] for a fall in the bathtub, in which she had a concussion and was dx with neck strain. Patient continues to have pain in her neck despite taking mobic. Patient reports a tight pain in the back of her neck. Patient has pain with ROM of her neck. Will give cyclobenzaprine at this time. Discussed SE and use of medication.  Do not take while driving or drink alcohol with medication. Referral placed to PT. You may use heat or ice for cervical pain. May use Voltaren gel for pain. Orders:  -     cyclobenzaprine (FLEXERIL) 10 mg tablet; Take 1 tablet (10 mg total) by mouth 3 (three) times a day as needed for muscle spasms  -     Ambulatory Referral to Physical Therapy; Future    3. Nausea  Assessment & Plan:  Patient experiencing nausea related to post concussion headaches. Will give Zofran for as needed nausea. Orders:  -     ondansetron (ZOFRAN) 4 mg tablet; Take 1 tablet (4 mg total) by mouth every 12 (twelve) hours as needed for nausea or vomiting for up to 20 doses           Subjective      Patient here today to follow-up on concussion which occurred on 08/12/2023. Patient was seen in the [facility] ER due to a fall and head injury which resulted in concussion. Patient had a negative brain/spine/cervical CT scan. Patient reports she continues to have fatigue, brain fog, headaches. Patient reports nausea and light sensitivity related to headaches. Patient notes she was back to school on Monday, 8/21/2023 and had to leave early due to loud noises. Patient reports yesterday students return to school and due to stress she experienced an extreme headache. Patient does take Tylenol for headaches which offer some relief, however when headaches are severe she notes Tylenol is not effective. Patient was referred to the concussion clinic in [place], however she was not sure if they would call her or if she needs to call. Patient does not have an appointment at this time. Patient also reporting posterior neck pain as a result of her neck strain due to fall. She has been taking Mobic which offers little relief. Patient reports posterior neck pain is a tight pain, which limits her range of motion. Review of Systems   Constitutional: Positive for fatigue. HENT: Negative. Eyes: Negative. Respiratory: Negative. Cardiovascular: Negative. Gastrointestinal: Negative. Genitourinary: Negative. Musculoskeletal: Positive for neck pain and neck stiffness. Negative for arthralgias and joint swelling. Neurological: Positive for headaches. Negative for dizziness, tremors, seizures, syncope, facial asymmetry, weakness and numbness. Current Outpatient Medications on File Prior to Visit   Medication Sig   • escitalopram (LEXAPRO) 20 mg tablet Take 1 tablet (20 mg total) by mouth daily   • meloxicam (MOBIC) 15 mg tablet Take 15 mg by mouth daily   • oxybutynin (DITROPAN) 5 mg tablet Take 1 tablet (5 mg total) by mouth in the morning   • traZODone (DESYREL) 100 mg tablet Take 1 tablet (100 mg total) by mouth daily at bedtime   • [DISCONTINUED] medroxyPROGESTERone (DEPO-PROVERA) 150 mg/mL injection Inject 150 mg into a muscle every 3 (three) months   • [DISCONTINUED] medroxyPROGESTERone (Depo-Provera) 150 mg/mL injection 150 mg   • [DISCONTINUED] medroxyPROGESTERone (DEPO-PROVERA) 150 mg/mL injection 150 mg   • [DISCONTINUED] ondansetron (ZOFRAN) 4 mg tablet Take 1 tablet (4 mg total) by mouth every 12 (twelve) hours as needed for nausea or vomiting for up to 20 doses       Objective     /81 (BP Location: Left arm, Patient Position: Sitting, Cuff Size: Large)   Pulse 99   Ht 5' 2" (1.575 m)   Wt 105 kg (230 lb 6.4 oz)   SpO2 99%   BMI 42.14 kg/m²     Physical Exam  Vitals and nursing note reviewed. Constitutional:       General: She is not in acute distress. Appearance: Normal appearance. HENT:      Head: Normocephalic and atraumatic. Right Ear: External ear normal.      Left Ear: External ear normal.      Nose: Nose normal.   Eyes:      General:         Right eye: No discharge. Left eye: No discharge. Conjunctiva/sclera: Conjunctivae normal.   Cardiovascular:      Rate and Rhythm: Normal rate and regular rhythm. Heart sounds: Normal heart sounds. No murmur heard.   Pulmonary:      Effort: Pulmonary effort is normal.      Breath sounds: Normal breath sounds. Abdominal:      General: Bowel sounds are normal.      Palpations: Abdomen is soft. Musculoskeletal:      Cervical back: Spasms and tenderness present. No swelling, edema, deformity, erythema, rigidity, bony tenderness or crepitus. Pain with movement present. Decreased range of motion. Thoracic back: Normal.      Lumbar back: Normal.      Right lower leg: No edema. Left lower leg: No edema. Lymphadenopathy:      Cervical: No cervical adenopathy. Skin:     General: Skin is warm and dry. Neurological:      Mental Status: She is alert and oriented to person, place, and time. Psychiatric:         Mood and Affect: Mood normal.         Behavior: Behavior normal.         Thought Content:  Thought content normal.         Judgment: Judgment normal.       CLARENCE Sellers

## 2023-08-24 NOTE — LETTER
August 24, 2023     Patient: Ashley Gutierrez  YOB: 1990  Date of Visit: 8/24/2023      To Whom it May Concern:    Whitney Burns is under my professional care. Fantasma Yin was seen in my office on 8/24/2023. Fantasma iYn may return to school on 08/28/2023 . If you have any questions or concerns, please don't hesitate to call.          Sincerely,          CLARENCE Martinez        CC: No Recipients

## 2023-08-24 NOTE — ASSESSMENT & PLAN NOTE
Patient was seen at [facility] for a fall in the bathtub, in which she had a concussion and was dx with neck strain. Patient continues to have pain in her neck despite taking mobic. Patient reports a tight pain in the back of her neck. Patient has pain with ROM of her neck. Will give cyclobenzaprine at this time. Discussed SE and use of medication. Do not take while driving or drink alcohol with medication. Referral placed to PT. You may use heat or ice for cervical pain. May use Voltaren gel for pain.

## 2023-08-24 NOTE — ASSESSMENT & PLAN NOTE
Patient with reported concussion on 08/12/2023 due to a fall in her bathtub. She was seen at the [facility] ER and had a negative brain/spine/cervical CT scan. Patient is here to follow back up due to continued fatigue, brain fog, and headaches. She reports headaches cause her nausea, sensitivity to light, and sensitivity to loud noises. Patient returned back to work on Monday 08/21/2023 and reports she had to leave early due to loud noises, and has struggled with stress and computer screens triggering headaches. Patient was referred to the [facility] concussion clinic, but has not called to schedule and appointment. Patient was given number again today and reports she will call today for an appointment. Patient was given a note for the next two day off of work, so she can make time for the concussion clinic. Discussed normal time frames for concussion symptoms and gave reassurance. Patient given a refill on zofran for nausea. She denies tremors, weakness, seizures.

## 2023-08-24 NOTE — PATIENT INSTRUCTIONS
Concussion   WHAT YOU NEED TO KNOW:   A concussion is a mild brain injury. It is usually caused by a bump or blow to the head from a fall, a motor vehicle crash, or a sports injury. Being shaken forcefully may cause a concussion. DISCHARGE INSTRUCTIONS:   Call your local emergency number (911 in the [place]), or have someone call if:   You cannot be woken. You have a seizure, increasing confusion, or a change in personality. Your speech becomes slurred. Return to the emergency department if:   You have sudden or new vision problems. One of your pupils is bigger than the other. You have a severe headache that does not go away. You have arm or leg weakness, numbness, or new problems with coordination. You have blood or clear fluid coming out of the ears or nose. You cannot stop vomiting. Call your doctor if:   You have nausea or are vomiting. You feel more sleepy than usual.    Your symptoms get worse. Your symptoms last longer than 6 weeks. You have questions or concerns about your condition or care. Medicines: You may need any of the following:  Anti-nausea medicine  may be given to treat nausea and vomiting. Acetaminophen  decreases pain and fever. It is available without a doctor's order. Ask how much to take and how often to take it. Follow directions. Read the labels of all other medicines you are using to see if they also contain acetaminophen, or ask your doctor or pharmacist. Acetaminophen can cause liver damage if not taken correctly. Take your medicine as directed. Contact your healthcare provider if you think your medicine is not helping or if you have side effects. Tell your provider if you are allergic to any medicine. Keep a list of the medicines, vitamins, and herbs you take. Include the amounts, and when and why you take them. Bring the list or the pill bottles to follow-up visits. Carry your medicine list with you in case of an emergency.     Self-care: Concussion symptoms usually go away within 10 days, but they may last longer. The following may be recommended to manage your symptoms:  Rest from physical and mental activities as directed. Mental activities are those that require thinking, concentration, and attention. You will need to rest until your symptoms are gone. Rest will allow you to recover from your concussion. Ask your healthcare provider when you can return to work and other daily activities. Have someone stay with you for the first 24 hours after your injury. Your healthcare provider should be contacted if your symptoms get worse, or you develop new symptoms. Do not participate in sports and physical activities until your healthcare provider says it is okay. These can make your symptoms worse or lead to another concussion. Your healthcare provider will tell you when it is okay for you to return to sports or physical activities. Ask for more information about sports concussions. Do not use heavy machinery or drive for 24 hours after your injury, or as directed. This can be dangerous and cause a serious accident. Your healthcare provider will tell you when it is safe for you to return to these activities. Prevent another concussion:   Wear protective sports equipment that fits properly. Helmets help lower your risk for a serious brain injury. Talk to your healthcare provider about ways you can decrease your risk for a concussion if you play sports. Wear your seatbelt every time you travel. This helps lower your risk for a head injury if you are in a car accident. Follow up with your doctor as directed:  Write down your questions so you remember to ask them during your visits. For more information:   [employer]  [address] , [address]  Phone: [address]  Phone: 1- 394 - [phone]  Web Address: [email or web address]. [employer].Me    © Copyright Rudy Escalante 2022 Information is for End User's use only and may not be sold, redistributed or otherwise used for commercial purposes. The above information is an  only. It is not intended as medical advice for individual conditions or treatments. Talk to your doctor, nurse or pharmacist before following any medical regimen to see if it is safe and effective for you.

## 2023-08-24 NOTE — ASSESSMENT & PLAN NOTE
Patient experiencing nausea related to post concussion headaches. Will give Zofran for as needed nausea.

## 2023-08-25 DIAGNOSIS — S16.1XXD STRAIN OF NECK MUSCLE, SUBSEQUENT ENCOUNTER: Primary | ICD-10-CM

## 2023-08-25 DIAGNOSIS — F07.81 POST-CONCUSSION SYNDROME: ICD-10-CM

## 2023-08-25 DIAGNOSIS — F07.81 POST-CONCUSSION SYNDROME: Primary | ICD-10-CM

## 2023-08-30 ENCOUNTER — HOSPITAL ENCOUNTER (OUTPATIENT)
Dept: MRI IMAGING | Facility: HOSPITAL | Age: 33
Discharge: HOME | End: 2023-08-30
Payer: COMMERCIAL

## 2023-08-30 VITALS — BODY MASS INDEX: 42.6 KG/M2 | WEIGHT: 231.48 LBS | HEIGHT: 62 IN

## 2023-08-30 DIAGNOSIS — Z15.09 MONOALLELIC MUTATION OF CHEK2 GENE IN FEMALE PATIENT: ICD-10-CM

## 2023-08-30 DIAGNOSIS — Z80.3 FAMILY HISTORY OF BREAST CANCER: ICD-10-CM

## 2023-08-30 DIAGNOSIS — Z15.02 MONOALLELIC MUTATION OF CHEK2 GENE IN FEMALE PATIENT: ICD-10-CM

## 2023-08-30 DIAGNOSIS — Z15.89 MONOALLELIC MUTATION OF CHEK2 GENE IN FEMALE PATIENT: ICD-10-CM

## 2023-08-30 DIAGNOSIS — Z15.01 MONOALLELIC MUTATION OF CHEK2 GENE IN FEMALE PATIENT: ICD-10-CM

## 2023-08-30 DIAGNOSIS — Z91.89 INCREASED RISK OF BREAST CANCER: ICD-10-CM

## 2023-08-30 PROCEDURE — A9585 GADOBUTROL INJECTION: HCPCS | Performed by: NURSE PRACTITIONER

## 2023-08-30 PROCEDURE — 77049 MRI BREAST C-+ W/CAD BI: CPT

## 2023-08-30 PROCEDURE — C8908 MRI W/O FOL W/CONT, BREAST,: HCPCS

## 2023-08-30 PROCEDURE — G1004 CDSM NDSC: HCPCS

## 2023-08-30 RX ORDER — GADOBUTROL 604.72 MG/ML
10 INJECTION INTRAVENOUS
Status: COMPLETED | OUTPATIENT
Start: 2023-08-30 | End: 2023-08-30

## 2023-08-30 RX ADMIN — GADOBUTROL 10 ML: 604.72 INJECTION INTRAVENOUS at 14:25

## 2023-09-12 ENCOUNTER — EVALUATION (OUTPATIENT)
Age: 33
End: 2023-09-12
Payer: COMMERCIAL

## 2023-09-12 DIAGNOSIS — F07.81 POST-CONCUSSION SYNDROME: ICD-10-CM

## 2023-09-12 DIAGNOSIS — S16.1XXD STRAIN OF NECK MUSCLE, SUBSEQUENT ENCOUNTER: ICD-10-CM

## 2023-09-12 PROCEDURE — 97162 PT EVAL MOD COMPLEX 30 MIN: CPT | Performed by: PHYSICAL THERAPIST

## 2023-09-12 PROCEDURE — 97112 NEUROMUSCULAR REEDUCATION: CPT | Performed by: PHYSICAL THERAPIST

## 2023-09-12 NOTE — PROGRESS NOTES
PT Evaluation     Today's date: 2023  Patient name: Ian Ram  : 1990  MRN: [id number]  Referring provider: CLARENCE Block  Dx:   Encounter Diagnosis     ICD-10-CM    1. Strain of neck muscle, subsequent encounter  S16. 1XXD Ambulatory Referral to Physical Therapy      2. Post-concussion syndrome  F07.81 Ambulatory Referral to Physical Therapy          Start Time: 1530  Stop Time: 1630  Total time in clinic (min): 60 minutes    Assessment  Assessment details: Ian Ram presents with acute Cx pain and post concussion symp following fall at home s LOC. Deficits include decreased Cx ROM, SO hypomobility, poor Cx stabilizer activation, decreased gaze stability/VOR, and difficulty poor function on Bufallo TM test. Symp consistent c referring Dx. Pt would benefit from course of PT to resolve the above mentioned impairments and facilitate return to PLOF.      Impairments: abnormal or restricted ROM, activity intolerance, impaired physical strength, lacks appropriate home exercise program and pain with function    Symptom irritability: moderateUnderstanding of Dx/Px/POC: good   Prognosis: good    Goals  Short Term Functional Goals:   In 4 weeks patient will   1. Pt will demonstrate decreased cervical pain of 2/10 on a 10pt VAS to turn head s difficulty.   2. Patient will demonstrate 75% independence and compliance with home exercise program in order to promote self-management of condition and maximize improvements in functional mobility.   3. Patient will demonstrate 75% independence with maintaining proper posture, body mechanics, self pain management, and ADL modification in order to improve tolerance of ADLs.    4. pt will increase FOTO score by 10pts to reflect a statistically significant improvement.      Long Term Functional Goals (Goals for patient at discharge):    In 6 weeks patient will:   1. Pt will demonstrate decreased cervical pain of 0/10 on a 10pt VAS to perform all home and work tasks s difficulty.   2. Patient will demonstrate 100% independence and compliance with home exercise program in order to promote self-management of condition and maximize improvements in functional mobility.   3. Pt will be able to perform deep Cx flexor endurance for 30 sec to meet age matched norms. 4. pt will score at or above predicted discharge FOTO score of 72 to reflect improvement in subjective functional ability.        Plan  Patient would benefit from: skilled physical therapy  Referral necessary: No  Planned therapy interventions: manual therapy, neuromuscular re-education, patient education, therapeutic exercise, therapeutic activities and home exercise program  Frequency: 2x week  Duration in weeks: 6  Plan of Care beginning date: 2023  Plan of Care expiration date: 10/24/2023  Treatment plan discussed with: patient        Subjective Evaluation    History of Present Illness  Date of onset: 2023  Mechanism of injury: trauma  Mechanism of injury: Garr Epley is a 34 y.o. y/o female who reports 4wk Hx of neck pain and concussion symp which started when she hit her head getting out of the bathtub. CC reported as stiffness when turning head. Symp described as stiffness. Symptoms aggravated by turning head, sleeping. Symptoms improved by ice and heat. Pt stated goal is to resume normal activity. Has avoided doing workouts due to neck pain.   Hx of previous Concussion:  No     Dizziness:  No  Difficulty Concentrating: Yes (intermittent)   Hearing Changes:  No  Headache: Yes    Freq: 2-3x/wk    Duration: few hours        Light/Sound Sensitivity:   No  Taking meds for dizziness:   No  Symptoms change throughout the day:  No   Pacemaker:  No   Cancer: No             Not a recurrent problem   Quality of life: good    Patient Goals  Patient goals for therapy: decreased pain, independence with ADLs/IADLs and return to sport/leisure activities    Pain  Current pain ratin  At best pain ratin  At worst pain rating: 3  Quality: tight and squeezing  Relieving factors: ice and heat  Aggravating factors: overhead activity, lifting and keyboarding          Objective     Concurrent Complaints  Positive for nausea/motion sickness and poor concentration. Negative for headaches, tinnitus, visual change, memory loss, aural fullness and peripheral neuropathy    Palpation   Left   Tenderness of the suboccipitals. Right   Tenderness of the suboccipitals and upper trapezius. Trigger point to suboccipitals and upper trapezius. Active Range of Motion   Cervical/Thoracic Spine       Cervical    Flexion: 40 degrees  with pain  Extension: 35 degrees     with pain  Left rotation: 50 degrees  Right rotation: 45 degrees    with pain    Strength/Myotome Testing     Additional Strength Details  Deep Cx Flexion - 8 sec    Tests   Cervical     Right   Positive Spurling's Test A and cervical flexion-rotation test.   Neuro Exam:     Dizziness  Negative for disequilibrium, vertigo, oscillopsia, motion sickness, light-headedness, rocking or swaying, diplopia and floating or swimming. Headaches   Patient reports headaches: No.     Oculomotor exam   Oculomotor ROM: WNL  Resting nystagmus: not present   Gaze holding nystagmus: not present left  and not present right  Smooth pursuits: within normal limits  Convergence: 16cm  Convergence: abnormal  Dynamic visual acuity: abnormal    Functional outcomes   Functional outcome comment: mCTSIB - composite score 1.54  [place] treadmill test - 2. 2mph 3min   RPE 6  Nausea             Precautions: none     Daily Treatment Diary:      Initial Evaluation Date: 23  Compliance                      Visit Number 1                    Shae-Jonathan                   [address] [place] [address] Blvd Captured y                                                Manual                                                                                        Ther-Ex SO Rot finger sweep x15                                                                                                                                                                               Neuro Re-Ed                      VOR1  Vert/Lat x20 ea                     Pen pushup x20                                                   Ther-Act                                                               Modalities

## 2023-09-14 ENCOUNTER — OFFICE VISIT (OUTPATIENT)
Age: 33
End: 2023-09-14
Payer: COMMERCIAL

## 2023-09-14 DIAGNOSIS — S16.1XXD STRAIN OF NECK MUSCLE, SUBSEQUENT ENCOUNTER: Primary | ICD-10-CM

## 2023-09-14 DIAGNOSIS — F07.81 POST-CONCUSSION SYNDROME: ICD-10-CM

## 2023-09-14 PROCEDURE — 97140 MANUAL THERAPY 1/> REGIONS: CPT | Performed by: PHYSICAL THERAPIST

## 2023-09-14 PROCEDURE — 97112 NEUROMUSCULAR REEDUCATION: CPT | Performed by: PHYSICAL THERAPIST

## 2023-09-14 PROCEDURE — 97110 THERAPEUTIC EXERCISES: CPT | Performed by: PHYSICAL THERAPIST

## 2023-09-14 NOTE — PROGRESS NOTES
Daily Note     Today's date: 2023  Patient name: Sweetie Salazar  : 1990  MRN: [id number]  Referring provider: CLARENCE Jackson  Dx:   Encounter Diagnosis     ICD-10-CM    1. Strain of neck muscle, subsequent encounter  S16. 1XXD       2. Post-concussion syndrome  F07.81           Start Time: 1745  Stop Time: 1830  Total time in clinic (min): 45 minutes    Subjective: pt notes symp unchanged from IE. Tried doing HEP and has difficulty c finger sweeps. Objective: See treatment diary below      Assessment: Tolerated treatment well. Pt had tenderness in Cx and SO areas that limited MT. Performing UE and scapular AROM better tolerated. Postural exercises added and well tolerated. VOR and convergence training perf and pt had difficulty. Symp reproduced req multiple rest breaks. Patient demonstrated fatigue post treatment, would benefit from continued PT and HEP. Plan: Continue per plan of care. Progress treatment as tolerated.        Precautions: none     Daily Treatment Diary:      Initial Evaluation Date: 23  Compliance                    Visit Number 1 2                   Re-Eval  IE                 MC   Foto Captured y                                              Manual                      SOR   4x30"                   Scap depressions/glides  6'                                         Ther-Ex                                                                  SO Rot finger sweep x15  x20 (B)                   Shoulder flex c cane  x20                   Scap "D"   x20                   Wall Postures  15x5"                                                                                                           Neuro Re-Ed                      VOR1  Vert/Lat x20 ea  3x20 ea                   Pen pushup x20 x20                                                  Ther-Act                                                               Modalities

## 2023-09-16 ENCOUNTER — RA CDI HCC (OUTPATIENT)
Dept: OTHER | Facility: HOSPITAL | Age: 33
End: 2023-09-16

## 2023-09-19 ENCOUNTER — OFFICE VISIT (OUTPATIENT)
Age: 33
End: 2023-09-19
Payer: COMMERCIAL

## 2023-09-19 DIAGNOSIS — S16.1XXD STRAIN OF NECK MUSCLE, SUBSEQUENT ENCOUNTER: Primary | ICD-10-CM

## 2023-09-19 DIAGNOSIS — F07.81 POST-CONCUSSION SYNDROME: ICD-10-CM

## 2023-09-19 PROCEDURE — 97110 THERAPEUTIC EXERCISES: CPT | Performed by: PHYSICAL THERAPIST

## 2023-09-19 PROCEDURE — 97112 NEUROMUSCULAR REEDUCATION: CPT | Performed by: PHYSICAL THERAPIST

## 2023-09-19 PROCEDURE — 97140 MANUAL THERAPY 1/> REGIONS: CPT | Performed by: PHYSICAL THERAPIST

## 2023-09-19 NOTE — PROGRESS NOTES
Daily Note     Today's date: 2023  Patient name: Delroy Cheadle  : 1990  MRN: [id number]  Referring provider: CLARENCE Sam  Dx:   Encounter Diagnosis     ICD-10-CM    1. Strain of neck muscle, subsequent encounter  S16. 1XXD       2. Post-concussion syndrome  F07.81           Start Time: 05  Stop Time: 1835  Total time in clinic (min): 50 minutes    Subjective: pt notes she is feeling a little better and trying to walk daily. Objective: See treatment diary below      Assessment: Tolerated treatment well. Pt had tenderness in Cx and SO areas that limited MT, though better than last session. Performing UE and scapular AROM better tolerated. Postural exercises added and well tolerated. VOR and convergence training perf and pt had difficulty. Symp reproduced req multiple rest breaks. Patient demonstrated fatigue post treatment, would benefit from continued PT and HEP. Plan: Continue per plan of care. Progress treatment as tolerated.        Precautions: none     Daily Treatment Diary:      Initial Evaluation Date: 23  Compliance                  Visit Number 1 2  3                 Re-Eval  [address] Captured y                                            Manual                      SOR   4x30"  4x30"                 Scap depressions/glides  6'  6'                                       Ther-Ex                                                                  SO Rot finger sweep x15  x20 (B)  x20 (B)                 Shoulder flex c cane  x20  x20                  Scap "D"   x20  x20                  Wall Postures  15x5"  15x5"                 Chin tuck    10x5"                                                                                   Neuro Re-Ed                      VOR1  Vert/Lat x20 ea  3x20 ea  3x20 ea                 Pen pushup x20 x20 x20          Line reading   (B) x1   6'                                    Ther-Act Modalities

## 2023-09-21 ENCOUNTER — OFFICE VISIT (OUTPATIENT)
Age: 33
End: 2023-09-21
Payer: COMMERCIAL

## 2023-09-21 ENCOUNTER — OFFICE VISIT (OUTPATIENT)
Dept: FAMILY MEDICINE CLINIC | Facility: CLINIC | Age: 33
End: 2023-09-21

## 2023-09-21 VITALS
OXYGEN SATURATION: 98 % | BODY MASS INDEX: 42.76 KG/M2 | SYSTOLIC BLOOD PRESSURE: 123 MMHG | WEIGHT: 232.4 LBS | DIASTOLIC BLOOD PRESSURE: 84 MMHG | HEIGHT: 62 IN | TEMPERATURE: 98.4 F

## 2023-09-21 DIAGNOSIS — S16.1XXD STRAIN OF NECK MUSCLE, SUBSEQUENT ENCOUNTER: Primary | ICD-10-CM

## 2023-09-21 DIAGNOSIS — J40 BRONCHITIS: ICD-10-CM

## 2023-09-21 DIAGNOSIS — F07.81 POST-CONCUSSION SYNDROME: ICD-10-CM

## 2023-09-21 PROBLEM — E04.1 THYROID NODULE: Status: ACTIVE | Noted: 2023-09-21

## 2023-09-21 LAB
SARS-COV-2 AG UPPER RESP QL IA: NEGATIVE
VALID CONTROL: NORMAL

## 2023-09-21 PROCEDURE — 97112 NEUROMUSCULAR REEDUCATION: CPT | Performed by: PHYSICAL THERAPIST

## 2023-09-21 PROCEDURE — 99214 OFFICE O/P EST MOD 30 MIN: CPT | Performed by: NURSE PRACTITIONER

## 2023-09-21 PROCEDURE — MUCINEX DM 12 HR MUCINEX DM 12 HR: Performed by: NURSE PRACTITIONER

## 2023-09-21 PROCEDURE — 97110 THERAPEUTIC EXERCISES: CPT | Performed by: PHYSICAL THERAPIST

## 2023-09-21 PROCEDURE — FLEXERIL 10MG 30 FLEXERIL 10MG 30: Performed by: NURSE PRACTITIONER

## 2023-09-21 PROCEDURE — 87811 SARS-COV-2 COVID19 W/OPTIC: CPT | Performed by: NURSE PRACTITIONER

## 2023-09-21 PROCEDURE — AMOXICILLIN 500MG 21 AMOXICILLIN 500MG 21: Performed by: NURSE PRACTITIONER

## 2023-09-21 PROCEDURE — 97140 MANUAL THERAPY 1/> REGIONS: CPT | Performed by: PHYSICAL THERAPIST

## 2023-09-21 RX ORDER — AMOXICILLIN 500 MG/1
500 CAPSULE ORAL EVERY 12 HOURS SCHEDULED
Qty: 14 CAPSULE | Refills: 0
Start: 2023-09-21 | End: 2023-09-28

## 2023-09-21 RX ORDER — CYCLOBENZAPRINE HCL 10 MG
10 TABLET ORAL 3 TIMES DAILY PRN
Qty: 30 TABLET | Refills: 0
Start: 2023-09-21

## 2023-09-21 NOTE — PROGRESS NOTES
Daily Note     Today's date: 2023  Patient name: Ingrid Mckee  : 1990  MRN: [id number]  Referring provider: CLARENCE Palacios  Dx:   Encounter Diagnosis     ICD-10-CM    1. Strain of neck muscle, subsequent encounter  S16. 1XXD       2. Post-concussion syndrome  F07.81           Start Time: 1500  Stop Time: 1550  Total time in clinic (min): 50 minutes    Subjective: pt notes walking tolerance improved. Doing letter chart really made her understand visual limitations she didn't realize she had. Objective: See treatment diary below      Assessment: Tolerated treatment well. Pt had tenderness in Cx and SO areas that limited MT, though better than last session. Performing UE and scapular AROM better tolerated. Postural exercises added and well tolerated. VOR and convergence training perf and pt had difficulty. Symp reproduced req multiple rest breaks. Patient demonstrated fatigue post treatment, would benefit from continued PT and HEP. Plan: Continue per plan of care. Progress treatment as tolerated.        Precautions: none     Daily Treatment Diary:      Initial Evaluation Date: 23  Compliance                Visit Number 1 2  3  4               Re-Eval  [address] Captured y                                           Manual                      SOR   4x30"  4x30"  4x30"               Scap depressions/glides  6'  6'  6'                                     Ther-Ex                                                                  SO Rot finger sweep x15  x20 (B)  x20 (B)  x20 (B)               Shoulder flex c cane  x20  x20   x20                Scap "D"   x20  x20   x20                Wall Postures  15x5"  15x5"  15x5"               Chin tuck    10x5"  10x5"                                                                                 Neuro Re-Ed                      VOR1  Vert/Lat x20 ea  3x20 ea  3x20 ea  3x20 ea Pen pushup x20 x20 x20 x20         Line reading   (B) x1   6' (B) x1   6'                                   Ther-Act                                                               Modalities

## 2023-09-21 NOTE — PATIENT INSTRUCTIONS
Acute Bronchitis   WHAT YOU NEED TO KNOW:   Acute bronchitis is swelling and irritation in your lungs. It is usually caused by a virus and most often happens in the winter. Bronchitis may also be caused by bacteria or by a chemical irritant, such as smoke. DISCHARGE INSTRUCTIONS:   Return to the emergency department if:   You cough up blood. Your lips or fingernails turn blue. You feel like you are not getting enough air when you breathe. Call your doctor if:   Your symptoms do not go away or get worse, even after treatment. Your cough does not get better within 4 weeks. You have questions or concerns about your condition or care. Medicines: You may need any of the following:  Cough suppressants  decrease your urge to cough. Decongestants  help loosen mucus in your lungs and make it easier to cough up. This can help you breathe easier. Inhalers  may be given. Your healthcare provider may give you one or more inhalers to help you breathe easier and cough less. An inhaler gives you medicine to open your airways. Ask your healthcare provider to show you how to use your inhaler correctly. Antiviral medicine  treats infections caused by a virus. Antibiotics  may be given if your bronchitis is caused by bacteria or if you have lung condition. Acetaminophen  decreases pain and fever. It is available without a doctor's order. Ask how much to take and how often to take it. Follow directions. Read the labels of all other medicines you are using to see if they also contain acetaminophen, or ask your doctor or pharmacist. Acetaminophen can cause liver damage if not taken correctly. NSAIDs  help decrease swelling and pain or fever. This medicine is available with or without a doctor's order. NSAIDs can cause stomach bleeding or kidney problems in certain people. If you take blood thinner medicine, always ask your healthcare provider if NSAIDs are safe for you.  Always read the medicine label and follow directions. Self-care:   Drink liquids as directed. You may need to drink more liquids than usual to stay hydrated. Ask how much liquid to drink each day and which liquids are best for you. Use a cool mist humidifier. This increases air moisture in your home. This may make it easier for you to breathe and help decrease your cough. Get more rest.  Rest helps your body to heal. Slowly start to do more each day. Rest when you feel it is needed. Prevent acute bronchitis:       Ask about vaccines you may need. Get a flu vaccine each year as soon as recommended, usually in September or October. Ask your healthcare provider if you should also get a pneumonia or COVID-19 vaccine. Your healthcare provider can tell you if you should also get other vaccines, and when to get them. Prevent the spread of germs. You can decrease your risk for acute bronchitis and other illnesses by doing the following:     Wash your hands often with soap and water. Carry germ-killing hand lotion or gel with you. You can use the lotion or gel to clean your hands when soap and water are not available. Do not touch your eyes, nose, or mouth unless you have washed your hands first.    Always cover your mouth when you cough to prevent the spread of germs. It is best to cough into a tissue or your shirt sleeve instead of into your hand. Ask those around you to cover their mouths when they cough. Try to avoid people who have a cold or the flu. If you are sick, stay away from others as much as possible. Avoid irritants in the air. Avoid chemicals, fumes, and dust. Wear a face mask if you must work around dust or fumes. Stay inside on days when air pollution levels are high. If you have allergies, stay inside when pollen counts are high. Do not use aerosol products, such as spray-on deodorant, bug spray, and hair spray. Do not smoke or be around others who are smoking.   Nicotine and other chemicals in cigarettes and cigars can cause lung damage. Ask your healthcare provider for information if you currently smoke and need help to quit. E-cigarettes or smokeless tobacco still contain nicotine. Talk to your healthcare provider before you use these products. Follow up with your doctor as directed:  Write down questions you have so you will remember to ask them during your follow-up visits. © Copyright [employer] Juarez 2023 Information is for End User's use only and may not be sold, redistributed or otherwise used for commercial purposes. The above information is an  only. It is not intended as medical advice for individual conditions or treatments. Talk to your doctor, nurse or pharmacist before following any medical regimen to see if it is safe and effective for you.

## 2023-09-21 NOTE — ASSESSMENT & PLAN NOTE
Symptoms present for over 2-week and worsening. Symptoms consistent with bronchitis today. We will treat with amoxicillin, and Mucinex D both given in office today. Discussed side effects and use with patient. Patient is in agreement. Call if worsening or no improvement of symptoms. Patient tested negative for COVID in office today.

## 2023-09-21 NOTE — PROGRESS NOTES
Name: Kelsey Bueno      : 1990      MRN: [id number]  Encounter Provider: CLARENCE Steven  Encounter Date: 2023   Encounter department: [address] [place] [address]     1. Strain of neck muscle, subsequent encounter  Assessment & Plan:  Patient continues to have neck tightness. She is following with physical therapy, first session was last week and she has another session today. Patient reports cyclobenzaprine and heat does help with symptoms. Refill of cyclobenzaprine at this time. Orders:  -     cyclobenzaprine (FLEXERIL) 10 mg tablet; Take 1 tablet (10 mg total) by mouth 3 (three) times a day as needed for muscle spasms    2. Post-concussion syndrome  Assessment & Plan:  Patient continues to have occasional headaches which are relieved with Tylenol. She is currently in physical therapy for postconcussive syndrome. Patient is back to work at this time. Continue with physical therapy at this time. 3. Bronchitis  Assessment & Plan:  Symptoms present for over 2-week and worsening. Symptoms consistent with bronchitis today. We will treat with amoxicillin, and Mucinex D both given in office today. Discussed side effects and use with patient. Patient is in agreement. Call if worsening or no improvement of symptoms. Patient tested negative for COVID in office today. Orders:  -     amoxicillin (AMOXIL) 500 mg capsule; Take 1 capsule (500 mg total) by mouth every 12 (twelve) hours for 7 days  -     dextromethorphan-guaifenesin (MUCINEX DM)  MG per 12 hr tablet; Take 1 tablet by mouth every 12 (twelve) hours  -     POCT Rapid Covid Ag           Subjective       Patient here today to follow-up on post-concussion syndrome. Patient started physical therapy last week for postconcussion syndrome.   She reports during physical therapy throughout some of the exercises such as proprioception, she does experience some double vision. However patient reports outside of physical therapy she is experiencing headaches which do resolve with Tylenol. She does note that she continues to have neck tightness. She reports Flexeril did help her symptoms along with heat. Patient is out of Flexeril at this time and is requesting refills today. Patient has another physical therapy appointment today. Patient also reporting productive cough for over 2 weeks which is worsening. Patient reports she did have a fever 1 week ago of 99.8, but denies fevers at this time. Patient reporting fatigue and irritated throat from coughing. Patient denies current congestion, sinus pressure, fever, nausea, vomiting, diarrhea. Patient has not tested for COVID at this time. Review of Systems   Constitutional: Positive for fatigue. Negative for activity change, appetite change, chills, diaphoresis, fever and unexpected weight change. HENT: Positive for sore throat (irration from coughing). Negative for congestion, dental problem, drooling, ear discharge, ear pain, facial swelling, hearing loss, mouth sores, nosebleeds, postnasal drip, rhinorrhea, sinus pressure, sinus pain, sneezing, tinnitus, trouble swallowing and voice change. Eyes: Negative. Respiratory: Positive for cough (productive). Negative for apnea, choking, chest tightness, shortness of breath, wheezing and stridor. Cardiovascular: Negative. Gastrointestinal: Negative. Musculoskeletal: Negative. Neurological: Negative.         Current Outpatient Medications on File Prior to Visit   Medication Sig   • escitalopram (LEXAPRO) 20 mg tablet Take 1 tablet (20 mg total) by mouth daily   • meloxicam (MOBIC) 15 mg tablet Take 15 mg by mouth daily   • oxybutynin (DITROPAN) 5 mg tablet Take 1 tablet (5 mg total) by mouth in the morning   • traZODone (DESYREL) 100 mg tablet Take 1 tablet (100 mg total) by mouth daily at bedtime       Objective     /84 (BP Location: Left arm, Patient Position: Sitting, Cuff Size: Large)   Temp 98.4 °F (36.9 °C)   Ht 5' 2" (1.575 m)   Wt 105 kg (232 lb 6.4 oz)   SpO2 98%   BMI 42.51 kg/m²     Physical Exam  Vitals and nursing note reviewed. Constitutional:       General: She is not in acute distress. Appearance: Normal appearance. HENT:      Head: Normocephalic and atraumatic. Right Ear: External ear normal. No decreased hearing noted. No drainage or swelling. Tympanic membrane is injected. Tympanic membrane is not scarred, perforated, erythematous, retracted or bulging. Tympanic membrane has normal mobility. Left Ear: External ear normal. No decreased hearing noted. No drainage or swelling. Tympanic membrane is injected. Tympanic membrane is not scarred, perforated, erythematous, retracted or bulging. Nose: Nose normal.      Mouth/Throat:      Mouth: Mucous membranes are moist.      Pharynx: Oropharynx is clear. Eyes:      General:         Right eye: No discharge. Left eye: No discharge. Conjunctiva/sclera: Conjunctivae normal.   Cardiovascular:      Rate and Rhythm: Normal rate and regular rhythm. Heart sounds: Normal heart sounds. No murmur heard. Pulmonary:      Effort: Pulmonary effort is normal. No respiratory distress. Breath sounds: No stridor. Rhonchi (expiratory in LL bilaterally ) present. No wheezing or rales. Chest:      Chest wall: No tenderness. Abdominal:      General: Bowel sounds are normal.      Palpations: Abdomen is soft. Musculoskeletal:         General: Normal range of motion. Cervical back: Normal range of motion. Right lower leg: No edema. Left lower leg: No edema. Lymphadenopathy:      Cervical: No cervical adenopathy. Skin:     General: Skin is warm and dry. Neurological:      Mental Status: She is alert and oriented to person, place, and time.    Psychiatric:         Mood and Affect: Mood normal.         Behavior: Behavior normal.         Thought Content:  Thought content normal.         Judgment: Judgment normal.       CLARENCE Zaldivar

## 2023-09-21 NOTE — ASSESSMENT & PLAN NOTE
Patient continues to have neck tightness. She is following with physical therapy, first session was last week and she has another session today. Patient reports cyclobenzaprine and heat does help with symptoms. Refill of cyclobenzaprine at this time.

## 2023-09-21 NOTE — ASSESSMENT & PLAN NOTE
Patient continues to have occasional headaches which are relieved with Tylenol. She is currently in physical therapy for postconcussive syndrome. Patient is back to work at this time. Continue with physical therapy at this time.

## 2023-09-25 ENCOUNTER — OFFICE VISIT (OUTPATIENT)
Age: 33
End: 2023-09-25

## 2023-09-25 ENCOUNTER — APPOINTMENT (OUTPATIENT)
Age: 33
End: 2023-09-25
Payer: COMMERCIAL

## 2023-09-25 VITALS
BODY MASS INDEX: 42.69 KG/M2 | HEIGHT: 62 IN | TEMPERATURE: 100.6 F | OXYGEN SATURATION: 98 % | HEART RATE: 68 BPM | WEIGHT: 232 LBS | DIASTOLIC BLOOD PRESSURE: 86 MMHG | SYSTOLIC BLOOD PRESSURE: 126 MMHG

## 2023-09-25 DIAGNOSIS — H92.09 OTALGIA, UNSPECIFIED LATERALITY: ICD-10-CM

## 2023-09-25 DIAGNOSIS — R05.1 ACUTE COUGH: ICD-10-CM

## 2023-09-25 DIAGNOSIS — R11.2 NAUSEA AND VOMITING, UNSPECIFIED VOMITING TYPE: ICD-10-CM

## 2023-09-25 DIAGNOSIS — R50.9 FEVER, UNSPECIFIED FEVER CAUSE: ICD-10-CM

## 2023-09-25 DIAGNOSIS — R19.7 DIARRHEA, UNSPECIFIED TYPE: ICD-10-CM

## 2023-09-25 DIAGNOSIS — J40 BRONCHITIS: Primary | ICD-10-CM

## 2023-09-25 PROBLEM — Z00.00 WELL ADULT EXAM: Status: RESOLVED | Noted: 2023-07-27 | Resolved: 2023-09-25

## 2023-09-25 PROCEDURE — 99213 OFFICE O/P EST LOW 20 MIN: CPT | Performed by: STUDENT IN AN ORGANIZED HEALTH CARE EDUCATION/TRAINING PROGRAM

## 2023-09-25 RX ORDER — ONDANSETRON 4 MG/1
4 TABLET, FILM COATED ORAL EVERY 8 HOURS PRN
Qty: 20 TABLET | Refills: 0 | Status: SHIPPED | OUTPATIENT
Start: 2023-09-25

## 2023-09-25 RX ORDER — METHYLPREDNISOLONE 4 MG/1
TABLET ORAL
Qty: 21 EACH | Refills: 0 | Status: SHIPPED | OUTPATIENT
Start: 2023-09-25

## 2023-09-25 RX ORDER — MEDROXYPROGESTERONE ACETATE 150 MG/ML
150 INJECTION, SUSPENSION INTRAMUSCULAR
COMMUNITY

## 2023-09-25 RX ORDER — CEFDINIR 300 MG/1
300 CAPSULE ORAL EVERY 12 HOURS SCHEDULED
Qty: 14 CAPSULE | Refills: 0 | Status: SHIPPED | OUTPATIENT
Start: 2023-09-25 | End: 2023-10-02

## 2023-09-25 NOTE — PROGRESS NOTES
Assessment/Plan:    No problem-specific Assessment & Plan notes found for this encounter. Diagnoses and all orders for this visit:    Bronchitis  -     cefdinir (OMNICEF) 300 mg capsule; Take 1 capsule (300 mg total) by mouth every 12 (twelve) hours for 7 days  -     methylPREDNISolone 4 MG tablet therapy pack; Use as directed on package  -     XR chest pa & lateral; Future    Acute cough  -     cefdinir (OMNICEF) 300 mg capsule; Take 1 capsule (300 mg total) by mouth every 12 (twelve) hours for 7 days  -     methylPREDNISolone 4 MG tablet therapy pack; Use as directed on package  -     XR chest pa & lateral; Future    Otalgia, unspecified laterality    Diarrhea, unspecified type    Nausea and vomiting, unspecified vomiting type  -     ondansetron (ZOFRAN) 4 mg tablet; Take 1 tablet (4 mg total) by mouth every 8 (eight) hours as needed for nausea or vomiting    Fever, unspecified fever cause    Other orders  -     medroxyPROGESTERone (DEPO-PROVERA) 150 mg/mL injection; Inject 150 mg into a muscle every 3 (three) months          Patient is a 25-year-old female who is presenting today for follow-up on recent bronchitis treatment. Patient is having continuing symptoms of cough, low-grade fever, ear pain and nausea vomiting. There is also been 1 day of diarrhea. She thinks this diarrhea may be related to recently switching from 1 week of fast food to home-cooked meals. Her symptoms have been going on longer than 3 weeks so I will x-ray her chest.  I will start her on cefdinir and a Medrol Dosepak. I will provide Zofran to be taken as needed for nausea. I will advise her based on findings of the x-ray. If there is any pneumonia process I will add on a Zithromax Z-VIMAL for treatment. Follow-up in our office if not improved after 1 week of treatment. Recommend supportive care with fluids, rest, flonase 1-2 sprays each nostril, otc claritin or zyrtec daily and mucinex as directed.  Tylenol recommended prn for pain or fever. Instructed pt RTO if symptoms persist or worsen over the course of the next week    20 minutes spent on physical exam and plan of care. This note was dictated using software. Subjective:      Patient ID: Kwan Arndt is a 34 y.o. female. HPI    The following portions of the patient's history were reviewed and updated as appropriate: allergies, current medications, past family history, past medical history, past social history, past surgical history and problem list.    Patient complains of symptoms of follow up bronchitis. Symptoms include cough, ear pain, nausea and vomiting, diarrhea for a day . Onset of symptoms was 3 weeks ago, and has been stable since that time. Treatment to date: antibiotics, antihistamines and cough suppressants. Review of Systems   Constitutional: Positive for fever. HENT: Positive for ear pain. Negative for sore throat. Respiratory: Positive for cough. Negative for shortness of breath. Cardiovascular: Negative for chest pain. Gastrointestinal: Positive for diarrhea, nausea and vomiting. Negative for abdominal pain and constipation. Genitourinary: Negative for difficulty urinating. Skin: Negative for rash. Objective:      /86 (BP Location: Left arm, Patient Position: Sitting, Cuff Size: Large)   Pulse 68   Temp (!) 100.6 °F (38.1 °C)   Ht 5' 2" (1.575 m)   Wt 105 kg (232 lb)   SpO2 98%   BMI 42.43 kg/m²          Physical Exam  Vitals and nursing note reviewed. Constitutional:       General: She is not in acute distress. Appearance: Normal appearance. She is obese. She is ill-appearing. HENT:      Head: Normocephalic and atraumatic. Right Ear: Ear canal and external ear normal. There is no impacted cerumen. Left Ear: Ear canal and external ear normal. There is no impacted cerumen. Ears:      Comments: Middle ear fluid bilaterally     Nose: Congestion present.       Mouth/Throat:      Mouth: Mucous membranes are moist.      Pharynx: Oropharynx is clear. No oropharyngeal exudate or posterior oropharyngeal erythema. Eyes:      Extraocular Movements: Extraocular movements intact. Conjunctiva/sclera: Conjunctivae normal.      Pupils: Pupils are equal, round, and reactive to light. Cardiovascular:      Rate and Rhythm: Normal rate and regular rhythm. Heart sounds: Normal heart sounds. No murmur heard. No friction rub. No gallop. Pulmonary:      Effort: Pulmonary effort is normal. No respiratory distress. Breath sounds: Rhonchi present. No wheezing. Musculoskeletal:         General: Normal range of motion. Cervical back: Normal range of motion. Skin:     General: Skin is warm and dry. Findings: No erythema or rash. Neurological:      General: No focal deficit present. Mental Status: She is alert and oriented to person, place, and time. Mental status is at baseline. Psychiatric:         Mood and Affect: Mood normal.         Behavior: Behavior normal.         Thought Content:  Thought content normal.         Judgment: Judgment normal.

## 2023-09-28 ENCOUNTER — OFFICE VISIT (OUTPATIENT)
Age: 33
End: 2023-09-28
Payer: COMMERCIAL

## 2023-09-28 DIAGNOSIS — S16.1XXD STRAIN OF NECK MUSCLE, SUBSEQUENT ENCOUNTER: Primary | ICD-10-CM

## 2023-09-28 DIAGNOSIS — F07.81 POST-CONCUSSION SYNDROME: ICD-10-CM

## 2023-09-28 PROCEDURE — 97110 THERAPEUTIC EXERCISES: CPT | Performed by: PHYSICAL THERAPIST

## 2023-09-28 PROCEDURE — 97112 NEUROMUSCULAR REEDUCATION: CPT | Performed by: PHYSICAL THERAPIST

## 2023-09-28 PROCEDURE — 97140 MANUAL THERAPY 1/> REGIONS: CPT | Performed by: PHYSICAL THERAPIST

## 2023-09-28 NOTE — PROGRESS NOTES
Daily Note     Today's date: 2023  Patient name: Alex Talbert  : 1990  MRN: [id number]  Referring provider: CLARENCE Soliz  Dx:   Encounter Diagnosis     ICD-10-CM    1. Strain of neck muscle, subsequent encounter  S16. 1XXD       2. Post-concussion syndrome  F07.81           Start Time: 1445  Stop Time: 1535  Total time in clinic (min): 50 minutes    Subjective: pt notes walking tolerance improved. Only feels limited c up/down motions. Lat VOR improved. Objective: See treatment diary below  Convergence - 6cm       Assessment: Tolerated treatment well. Pt had tenderness in Cx and SO areas that limited MT, though better than last session. Convergence measurement is now WNL. Performing UE and scapular AROM better tolerated. Postural exercises added and well tolerated. VOR and convergence training perf and pt had difficulty. Symp reproduced req multiple rest breaks. Patient demonstrated fatigue post treatment, would benefit from continued PT and HEP. Plan: Continue per plan of care. Progress treatment as tolerated.        Precautions: none     Daily Treatment Diary:      Initial Evaluation Date: 23  Compliance              Visit Number 1 2  3  4  5             Re-Eval  [address] Captured y                                        Manual                      SOR   4x30"  4x30"  4x30"  4x30"             Scap depressions/glides  6'  6'  6'  6'                                   Ther-Ex                                                                  SO Rot finger sweep x15  x20 (B)  x20 (B)  x20 (B)  x20 (B)             Shoulder flex c cane  x20  x20   x20   x20              Scap "D"   x20  x20   x20   x20              Wall Postures  15x5"  15x5"  15x5"  15x5"             Chin tuck    10x5"  10x5"  10x5"                                                                               Neuro Re-Ed VOR1  Vert/Lat x20 ea  3x20 ea  3x20 ea  3x20 ea  3x20 ea             Pen pushup x20 x20 x20 x20 x20        Line reading   (B) x1   6' (B) x1   6' (B) x1   6'                                  Ther-Act                                                               Modalities

## 2023-10-02 ENCOUNTER — OFFICE VISIT (OUTPATIENT)
Age: 33
End: 2023-10-02
Payer: COMMERCIAL

## 2023-10-02 DIAGNOSIS — F07.81 POST-CONCUSSION SYNDROME: ICD-10-CM

## 2023-10-02 DIAGNOSIS — S16.1XXD STRAIN OF NECK MUSCLE, SUBSEQUENT ENCOUNTER: Primary | ICD-10-CM

## 2023-10-02 PROCEDURE — 97110 THERAPEUTIC EXERCISES: CPT | Performed by: PHYSICAL THERAPIST

## 2023-10-02 PROCEDURE — 97112 NEUROMUSCULAR REEDUCATION: CPT | Performed by: PHYSICAL THERAPIST

## 2023-10-02 NOTE — PROGRESS NOTES
Daily Note     Today's date: 10/2/2023  Patient name: Cora Orona  : 1990  MRN: [id number]  Referring provider: CLARENCE Deleon  Dx:   Encounter Diagnosis     ICD-10-CM    1. Strain of neck muscle, subsequent encounter  S16. 1XXD       2. Post-concussion syndrome  F07.81           Start Time: 1700  Stop Time: 1745  Total time in clinic (min): 45 minutes    Subjective: pt notes continued improvement. Less dizziness. Objective: See treatment diary below      Assessment: Tolerated treatment well. Performing UE and scapular AROM better tolerated. Postural exercises added and well tolerated. VOR and convergence training perf and pt had difficulty. Balance tasks expanded due to pt improvement. Patient demonstrated fatigue post treatment, would benefit from continued PT and HEP. Plan: Continue per plan of care. Progress treatment as tolerated.        Precautions: none     Daily Treatment Diary:      Initial Evaluation Date: 23  Compliance 9/12  9/14  9/19  9/21  9/28  10/2           Visit Number 1 2  3  4  5  6           Re-Eval  [address] Captured y                           9/12  9/14  9/19 9/21   9/28  10/2           Manual                      SOR   4x30"  4x30"  4x30"  4x30"  4x30"           Scap depressions/glides  6'  6'  6'  6'  6'                                 Ther-Ex                                                                  SO Rot finger sweep x15  x20 (B)  x20 (B)  x20 (B)  x20 (B)  x20 (B)           Shoulder flex c cane  x20  x20   x20   x20               Scap "D"   x20  x20   x20   x20              Wall Postures  15x5"  15x5"  15x5"  15x5"  15x5"           Chin tuck    10x5"  10x5"  10x5" 10x5"                                                                             Neuro Re-Ed                      VOR1  Vert/Lat x20 ea  3x20 ea  3x20 ea  3x20 ea  3x20 ea  3x20 ea           Pen pushup x20 x20 x20 x20 x20 x20       Line reading   (B) x1   6' (B) x1   6' (B) x1   6' (B) x1   6'       (B) Banded ER      Grn 2x10 5"       In-place turning      3x4 (B)       Head turns c Fwd/Bwd walking      10ft x10 ea                    Ther-Act                                                               Modalities

## 2023-10-05 ENCOUNTER — OFFICE VISIT (OUTPATIENT)
Age: 33
End: 2023-10-05
Payer: COMMERCIAL

## 2023-10-05 DIAGNOSIS — F07.81 POST-CONCUSSION SYNDROME: Primary | ICD-10-CM

## 2023-10-05 DIAGNOSIS — S16.1XXD STRAIN OF NECK MUSCLE, SUBSEQUENT ENCOUNTER: ICD-10-CM

## 2023-10-05 PROCEDURE — 97110 THERAPEUTIC EXERCISES: CPT | Performed by: PHYSICAL THERAPIST

## 2023-10-05 PROCEDURE — 97112 NEUROMUSCULAR REEDUCATION: CPT | Performed by: PHYSICAL THERAPIST

## 2023-10-05 NOTE — PROGRESS NOTES
Daily Note     Today's date: 10/6/2023  Patient name: Vianney Garcia  : 1990  MRN: [id number]  Referring provider: CLARENCE Mccall  Dx:   Encounter Diagnosis     ICD-10-CM    1. Post-concussion syndrome  F07.81       2. Strain of neck muscle, subsequent encounter  S16. 1XXD           Start Time: 1500  Stop Time: 1550  Total time in clinic (min): 50 minutes    Subjective: pt notes continued improvement. Feels she is 80-85%. Objective: See treatment diary below      Assessment: Tolerated treatment well. UE AROM is WNL at this time. Postural exercises added and well tolerated. VOR and convergence is near baseline. Dynamic balance and tasks c eyes closed are only impairments at this time. Patient demonstrated fatigue post treatment, would benefit from continued PT and HEP. Plan: Continue per plan of care. Progress treatment as tolerated.        Precautions: none     Daily Treatment Diary:      Initial Evaluation Date: 23  Compliance 9/12  9/14  9/19  9/21  9/28  10/2  10/5         Visit Number 1 2  3  4  5  6  7         Re-Eval  IE                 [facility]   [place] Captured y                           9/12  9/14  9/19 9/21   9/28  10/2  10/5         Manual                      SOR   4x30"  4x30"  4x30"  4x30"  4x30" 4x30"         Scap depressions/glides  6'  6'  6'  6'  6'  6'                               Ther-Ex                                                                  SO Rot finger sweep x15  x20 (B)  x20 (B)  x20 (B)  x20 (B)  x20 (B)  x20 (B)         Shoulder flex c cane  x20  x20   x20   x20               Scap "D"   x20  x20   x20   x20              Wall Postures  15x5"  15x5"  15x5"  15x5"  15x5" 15x5"         Chin tuck    10x5"  10x5"  10x5" 10x5"  10x5"                                                                           Neuro Re-Ed                      VOR1  Vert/Lat x20 ea  3x20 ea  3x20 ea  3x20 ea  3x20 ea  3x20 ea  3x20 ea         Pen pushup x20 x20 x20 x20 x20 x20 x20      Line reading   (B) x1   6' (B) x1   6' (B) x1   6' (B) x1   6' (B) x1   6'      (B) Banded ER      Grn 2x10 5" Grn 2x10 5"      In-place turning      3x4 (B) 3x4 (B)      Head turns c Fwd/Bwd walking      10ft x10 ea 10ft x10 ea                   Ther-Act                                                               Modalities

## 2023-10-10 ENCOUNTER — APPOINTMENT (OUTPATIENT)
Age: 33
End: 2023-10-10
Payer: COMMERCIAL

## 2023-10-12 ENCOUNTER — OFFICE VISIT (OUTPATIENT)
Age: 33
End: 2023-10-12
Payer: COMMERCIAL

## 2023-10-12 DIAGNOSIS — S16.1XXD STRAIN OF NECK MUSCLE, SUBSEQUENT ENCOUNTER: ICD-10-CM

## 2023-10-12 DIAGNOSIS — F07.81 POST-CONCUSSION SYNDROME: Primary | ICD-10-CM

## 2023-10-12 PROCEDURE — 97112 NEUROMUSCULAR REEDUCATION: CPT | Performed by: PHYSICAL THERAPIST

## 2023-10-12 PROCEDURE — 97110 THERAPEUTIC EXERCISES: CPT | Performed by: PHYSICAL THERAPIST

## 2023-10-12 NOTE — PROGRESS NOTES
Daily Note     Today's date: 10/13/2023  Patient name: Alex Talbert  : 1990  MRN: [id number]  Referring provider: CLARENCE Soliz  Dx:   Encounter Diagnosis     ICD-10-CM    1. Post-concussion syndrome  F07.81       2. Strain of neck muscle, subsequent encounter  S16. 1XXD             Start Time: 5777  Stop Time: 1700  Total time in clinic (min): 45 minutes    Subjective: pt notes continued improvement. Feels she is 80-85%. Objective: See treatment diary below      Assessment: Tolerated treatment well. UE AROM is WNL at this time. Postural exercises added and well tolerated. Tri-planer tasks added to challenge habituation. Dynamic balance and tasks c eyes closed are only impairments at this time. Patient demonstrated fatigue post treatment, would benefit from continued PT and HEP. Plan: Continue per plan of care. Progress treatment as tolerated.        Precautions: none     Daily Treatment Diary:      Initial Evaluation Date: 23  Compliance 9/12  9/14  9/19  9/21  9/28  10/2  10/5  10/12       Visit Number 1 2  3  4  5  6  7  8       Re-Eval  [address] Captured y                           9/12  9/14  9/19 9/21   9/28  10/2  10/5  10/12       Manual                      SOR   4x30"  4x30"  4x30"  4x30"  4x30" 4x30"  4x30"       Scap depressions/glides  6'  6'  6'  6'  6'  6'  6'                             Ther-Ex                                                                  SO Rot finger sweep x15  x20 (B)  x20 (B)  x20 (B)  x20 (B)  x20 (B)  x20 (B)  x20 (B)       Shoulder flex c cane  x20  x20   x20   x20               Scap "D"   x20  x20   x20   x20              Wall Postures  15x5"  15x5"  15x5"  15x5"  15x5" 15x5"  15x5"       Chin tuck    10x5"  10x5"  10x5" 10x5"  10x5"  10x5"                                                                         Neuro Re-Ed                      VOR1  Vert/Lat x20 ea  3x20 ea  3x20 ea  3x20 ea  3x20 ea  3x20 ea  3x20 ea  3x20 ea       Pen pushup x20 x20 x20 x20 x20 x20 x20 D/C     Line reading   (B) x1   6' (B) x1   6' (B) x1   6' (B) x1   6' (B) x1   6' (B) x1   6'     (B) Banded ER      Grn 2x10 5" Grn 2x10 5" Grn 2x10 5"     In-place turning      3x4 (B) 3x4 (B) 3x4 (B)     Head turns c Fwd/Bwd walking      10ft x10 ea 10ft x10 ea 10ft x10 ea     Torso PNF        3x5 (B)                  Ther-Act                                                               Modalities

## 2023-10-17 ENCOUNTER — APPOINTMENT (OUTPATIENT)
Age: 33
End: 2023-10-17
Payer: COMMERCIAL

## 2023-10-18 ENCOUNTER — RA CDI HCC (OUTPATIENT)
Dept: OTHER | Facility: HOSPITAL | Age: 33
End: 2023-10-18

## 2023-10-25 ENCOUNTER — SOCIAL WORK (OUTPATIENT)
Age: 33
End: 2023-10-25

## 2023-10-25 ENCOUNTER — CLINICAL SUPPORT (OUTPATIENT)
Dept: FAMILY MEDICINE CLINIC | Facility: CLINIC | Age: 33
End: 2023-10-25

## 2023-10-25 DIAGNOSIS — F41.9 ANXIETY AND DEPRESSION: Primary | ICD-10-CM

## 2023-10-25 DIAGNOSIS — F32.A ANXIETY AND DEPRESSION: Primary | ICD-10-CM

## 2023-10-25 DIAGNOSIS — Z23 ENCOUNTER FOR ADMINISTRATION OF VACCINE: Primary | ICD-10-CM

## 2023-10-25 PROCEDURE — 96372 THER/PROPH/DIAG INJ SC/IM: CPT

## 2023-10-25 PROCEDURE — 90834 PSYTX W PT 45 MINUTES: CPT

## 2023-10-25 RX ORDER — MEDROXYPROGESTERONE ACETATE 150 MG/ML
150 INJECTION, SUSPENSION INTRAMUSCULAR ONCE
Status: COMPLETED | OUTPATIENT
Start: 2023-10-25 | End: 2023-10-25

## 2023-10-25 RX ADMIN — MEDROXYPROGESTERONE ACETATE 150 MG: 150 INJECTION, SUSPENSION INTRAMUSCULAR at 15:57

## 2023-10-25 NOTE — PSYCH
Behavioral Health Psychotherapy Assessment    Date of Initial Psychotherapy Assessment: 10/25/23  Referral Source: Jessica Pereira  Has a release of information been signed for the referral source? Yes    Preferred Name: Tripp Lopez  Preferred Pronouns: She/her  YOB: 1990 Age: 28 y.o. Sex assigned at birth: female   Gender Identity: female  Race:   Preferred Language: English    Emergency Contact:  Full Name: Daysi Hinojosa  Relationship to Client: spouse  Contact information: [phone]    Primary Care Physician:  Nunu Pabon [address] [address]  [phone]  Has a release of information been signed? Yes    Physical Health History:  Past surgical procedures: n/a  Do you have a history of any of the following: thyroid disease and other history of concussion  Do you have any mobility issues? No    Relevant Family History:  Family history of depression and suicide attempts     Presenting Problem (What brings you in?)  My depression is getting worse so I thought I should come talk to someone    Mental Health Advance Directive:  Do you currently have a [facility] Directive? no    Diagnosis:   Diagnosis ICD-10-CM Associated Orders   1. Anxiety and depression  F41.9     F32. A           Initial Assessment:     Current Mental Status:    Appearance: appropriate      Behavior/Manner: cooperative      Affect/Mood:  Anxious and euthymic    Speech:  Normal    Oriented to: oriented to self, oriented to place and oriented to time       Clinical Symptoms    Depression: yes      Anxiety: yes      Depression Symptoms: depressed mood      Anxiety Symptoms: excessive worry, nervous/anxious and difficulty controlling worry      Have you ever been assaultive to others or the environment: No      Have you ever been self-injurious: Yes    Additional Abuse/Self Harm history:  Patient has a history of a suicide attempt in college.      Counseling History:  Previous Counseling or Treatment  (Mental Health or Drug & Alcohol): Yes    Previous Counseling Details:  Patient saw a counselor after her suicide attempt but felt she couldn't trust the therapist because she feared that the therapist would share what she discussed with her parents. Have you previously taken psychiatric medications: Yes    Previous Medications Attempted:  Patient is currently taking Lexapro    Suicide Risk Assessment  Have you ever had a suicide attempt: Yes    Have you had incidents of suicidal ideation: Yes    Are you currently experiencing suicidal thoughts: No    Additional Suicide Risk Information:  Patient denies any current thoughts of suicide. Substance Abuse/Addiction Assessment:  Have you experienced blackouts as a result of substance use: No    Are you currently using any Medication Assisted Treatment for Substance Use: No      Disordered Eating History:  Do you have a history of disordered eating: No      Social Determinants of Health:    SDOH:  Stress    Trauma and Abuse History:    Have you ever been abused: No      Legal History:    Have you ever been arrested  or had a DUI: No      Have you been incarcerated: No      Are you currently on parole/probation: No      Any current Children and Youth involvement: No      Any pending legal charges: No      Relationship History:    Current marital status:       Natural Supports:  Friends and other    Other natural supports:  Xochitl Leaven at her job    Relationship History: Mother-in-law, Carlos Daily    Employment History    Are you currently employed: Yes      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 female who presents for an initial therapy session with this provider. Patient is . She and her spouse have a 10year old child, Janes Bhagat, that they adopted from foster care. Patient is struggling because her family, especially her father, have not accepted the fact that she is vaughn. Patient's mother is "kind of come around" but patient's spouse is "not allowed at their house" to celebrate holidays. In the past, the patient would take Janes Bhagat to celebrate the holidays and Katie, patient's spouse, would stay behind. This is when they were only fostering Janes Bhagat. Now that he has been adopted, patient wants to go as a family, or not at all. Patient reports that she has low self-worth and struggles that she has not "won her parents' approval". Worked with patient to start building her self-esteem and self-worth and developing healthy boundaries for herself. Worked with patient to acknowledge her feelings and work through them. Allowed for patient to identify her allies and look outside her "blood relatives" and identify those people that she can count on for support. Provided a safe space for patient to process feelings, review old and discuss new coping skills. Provided supportive therapy to reduce emotional distress, to work on improving self-esteem and self-care and enhance coping skills using attentive, reflective and sympathetic listening.       Visit start and stop times:    10/25/23  Start Time: 1600  Stop Time: 1652  Total Visit Time: 52 minutes

## 2023-11-08 ENCOUNTER — SOCIAL WORK (OUTPATIENT)
Age: 33
End: 2023-11-08

## 2023-11-08 DIAGNOSIS — F41.9 ANXIETY AND DEPRESSION: Primary | ICD-10-CM

## 2023-11-08 DIAGNOSIS — F32.A ANXIETY AND DEPRESSION: Primary | ICD-10-CM

## 2023-11-08 PROCEDURE — 90834 PSYTX W PT 45 MINUTES: CPT

## 2023-11-08 NOTE — PSYCH
Behavioral Health Psychotherapy Progress Note    Psychotherapy Provided: Individual Psychotherapy     1. Anxiety and depression          During this session, this clinician used the following therapeutic modalities: Cognitive Behavioral Therapy, Cognitive Processing Therapy, Mindfulness-based Strategies, and Supportive Psychotherapy    Substance Abuse was not addressed during this session. If the client is diagnosed with a co-occurring substance use disorder, please indicate any changes in the frequency or amount of use: n/a. Stage of change for addressing substance use diagnoses: No substance use/Not applicable    ASSESSMENT:  Madeleine Shook presents with a Euthymic/ normal mood. her affect is Normal range and intensity, which is congruent, with her mood and the content of the session. The client has made progress on their goals. Madeleine Shook presents with a none risk of suicide, none risk of self-harm, and none risk of harm to others. For any risk assessment that surpasses a "low" rating, a safety plan must be developed. A safety plan was indicated: no  If yes, describe in detail n/a    PLAN: Between sessions, Madeleine Shook will continue to use coping strategies to manage anxiety and depression. Discussed with patient her symptoms, provided supportive counseling, and allowed for patient to identify and explore her emotions. Patient is a big [employer] fan. She takes away symbolism and ideas from the movie / books to manage her depression and anxiety. Discussed how patient can be more "proactive" rather than "reactive" when it comes to people irritating and frustrating her. Patient's father would physically discipline out of anger and had a "short fuse". Patient is aware that she can have that same temper but tries to find ways to manage her anger effectively instead of with destruction.   At the next session, the therapist will use Cognitive Behavioral Therapy, Cognitive Processing Therapy, Mindfulness-based Strategies, and Supportive Psychotherapy to address depression, self-care and emotion regulation. Behavioral Health Treatment Plan and Discharge Planning: Brent Collins is aware of and agrees to continue to work on their treatment plan. They have identified and are working toward their discharge goals.  yes    Visit start and stop times:    11/08/23  Start Time: 1500  Stop Time: 1550  Total Visit Time: 50 minutes

## 2023-11-29 ENCOUNTER — SOCIAL WORK (OUTPATIENT)
Age: 33
End: 2023-11-29

## 2023-11-29 DIAGNOSIS — F41.9 ANXIETY AND DEPRESSION: Primary | ICD-10-CM

## 2023-11-29 DIAGNOSIS — F32.A ANXIETY AND DEPRESSION: Primary | ICD-10-CM

## 2023-11-29 PROCEDURE — 90834 PSYTX W PT 45 MINUTES: CPT

## 2023-11-29 NOTE — PSYCH
Behavioral Health Psychotherapy Progress Note    Psychotherapy Provided: Individual Psychotherapy     1. Anxiety and depression          During this session, this clinician used the following therapeutic modalities: Bereavement Therapy, Cognitive Behavioral Therapy, Cognitive Processing Therapy, Mindfulness-based Strategies, and Supportive Psychotherapy    Substance Abuse was not addressed during this session. If the client is diagnosed with a co-occurring substance use disorder, please indicate any changes in the frequency or amount of use: n/a. Stage of change for addressing substance use diagnoses: No substance use/Not applicable    ASSESSMENT:  Desiree Lopez presents with a Euthymic/ normal mood. her affect is Normal range and intensity, which is congruent, with her mood and the content of the session. The client has made progress on their goals. Desiree Lopez presents with a none risk of suicide, none risk of self-harm, and none risk of harm to others. For any risk assessment that surpasses a "low" rating, a safety plan must be developed. A safety plan was indicated: no  If yes, describe in detail n/a    PLAN: Between sessions, Desiree Lopez will continue to use coping strategies to manage symptoms of depression and anxiety. The patient wanted to talk about this session about her anxiety surrounding the holidays. Patient reports that she and her spouse are financially struggling and not sure how they are going to afford [place] presents for their 10year old son. Patient reports feeling sad. Patient is also feeling sad because she had to put down her cat a few weeks ago. December is also the month her grandfather  due to Covid complications. Validated and normalized feelings. Provided grief and supportive counseling.   At the next session, the therapist will use Cognitive Behavioral Therapy, Cognitive Processing Therapy, Mindfulness-based Strategies, and Supportive Psychotherapy to address anxiety and depression. Behavioral Health Treatment Plan and Discharge Planning: Kely Sanchez is aware of and agrees to continue to work on their treatment plan. They have identified and are working toward their discharge goals.  yes    Visit start and stop times:    11/29/23  Start Time: 1200  Stop Time: 1250  Total Visit Time: 50 minutes

## 2023-12-18 DIAGNOSIS — Z30.8 ENCOUNTER FOR OTHER CONTRACEPTIVE MANAGEMENT: Primary | ICD-10-CM

## 2023-12-18 RX ORDER — MEDROXYPROGESTERONE ACETATE 150 MG/ML
150 INJECTION, SUSPENSION INTRAMUSCULAR
Qty: 1 ML | Refills: 0 | Status: SHIPPED | OUTPATIENT
Start: 2023-12-18

## 2024-01-17 ENCOUNTER — OFFICE VISIT (OUTPATIENT)
Dept: FAMILY MEDICINE CLINIC | Facility: CLINIC | Age: 34
End: 2024-01-17

## 2024-01-17 VITALS
OXYGEN SATURATION: 98 % | WEIGHT: 234 LBS | SYSTOLIC BLOOD PRESSURE: 120 MMHG | HEIGHT: 62 IN | BODY MASS INDEX: 43.06 KG/M2 | TEMPERATURE: 99.1 F | HEART RATE: 118 BPM | DIASTOLIC BLOOD PRESSURE: 86 MMHG

## 2024-01-17 DIAGNOSIS — R21 RASH: ICD-10-CM

## 2024-01-17 DIAGNOSIS — R51.9 ACUTE NONINTRACTABLE HEADACHE, UNSPECIFIED HEADACHE TYPE: ICD-10-CM

## 2024-01-17 DIAGNOSIS — R53.83 TIRED: Primary | ICD-10-CM

## 2024-01-17 DIAGNOSIS — R19.7 DIARRHEA, UNSPECIFIED TYPE: ICD-10-CM

## 2024-01-17 DIAGNOSIS — R11.2 NAUSEA AND VOMITING, UNSPECIFIED VOMITING TYPE: ICD-10-CM

## 2024-01-17 DIAGNOSIS — K30 UPSET STOMACH: ICD-10-CM

## 2024-01-17 LAB
SARS-COV-2 AG UPPER RESP QL IA: NEGATIVE
SL AMB POCT RAPID FLU A: NEGATIVE
SL AMB POCT RAPID FLU B: NEGATIVE
VALID CONTROL: NORMAL

## 2024-01-17 PROCEDURE — 99213 OFFICE O/P EST LOW 20 MIN: CPT | Performed by: STUDENT IN AN ORGANIZED HEALTH CARE EDUCATION/TRAINING PROGRAM

## 2024-01-17 PROCEDURE — 87804 INFLUENZA ASSAY W/OPTIC: CPT | Performed by: STUDENT IN AN ORGANIZED HEALTH CARE EDUCATION/TRAINING PROGRAM

## 2024-01-17 PROCEDURE — 87811 SARS-COV-2 COVID19 W/OPTIC: CPT | Performed by: STUDENT IN AN ORGANIZED HEALTH CARE EDUCATION/TRAINING PROGRAM

## 2024-01-17 RX ORDER — METHYLPREDNISOLONE 4 MG/1
TABLET ORAL
Start: 2024-01-17

## 2024-01-17 RX ORDER — ONDANSETRON 4 MG/1
4 TABLET, FILM COATED ORAL EVERY 8 HOURS PRN
Qty: 30 TABLET | Refills: 0 | Status: SHIPPED | OUTPATIENT
Start: 2024-01-17

## 2024-01-17 NOTE — PROGRESS NOTES
Assessment/Plan:    No problem-specific Assessment & Plan notes found for this encounter.       Diagnoses and all orders for this visit:    Tired  -     POCT Rapid Covid Ag  -     POCT rapid flu A and B  -     CBC and differential; Future  -     Comprehensive metabolic panel; Future  -     Mononucleosis screen; Future  -     CBC and differential  -     Comprehensive metabolic panel  -     Mononucleosis screen    Rash  -     POCT Rapid Covid Ag  -     POCT rapid flu A and B    Upset stomach  -     POCT Rapid Covid Ag  -     POCT rapid flu A and B  -     CBC and differential; Future  -     Comprehensive metabolic panel; Future  -     Lipase; Future  -     CBC and differential  -     Comprehensive metabolic panel  -     Lipase    Acute nonintractable headache, unspecified headache type  -     POCT Rapid Covid Ag  -     POCT rapid flu A and B  -     CBC and differential; Future  -     Comprehensive metabolic panel; Future  -     CBC and differential  -     Comprehensive metabolic panel  -     methylPREDNISolone 4 MG tablet therapy pack; Use as directed on package    Diarrhea, unspecified type  -     CBC and differential; Future  -     Comprehensive metabolic panel; Future  -     Lipase; Future  -     GASTROINTESTINAL PATHOGEN PANEL, REAL-TIME PCR; Future  -     C-reactive protein; Future  -     CBC and differential  -     Comprehensive metabolic panel  -     Lipase  -     GASTROINTESTINAL PATHOGEN PANEL, REAL-TIME PCR  -     C-reactive protein    Nausea and vomiting, unspecified vomiting type  -     ondansetron (ZOFRAN) 4 mg tablet; Take 1 tablet (4 mg total) by mouth every 8 (eight) hours as needed for nausea or vomiting          Patient is a 33-year-old female presenting with fatigue, rash, stomach pain, headache.  She states that this sickness has been going around the home and she has had symptoms for about the past week.  On examination she has some noted spotty red rash to the upper shoulders and back.  There is  "some mild postnasal drip and congestion.  She has some nausea and vomiting and diarrhea several times a day.  I will prescribe her some Zofran.  She is having bad headaches so I will also place her on a Medrol Dosepak.  Will order a baseline lab panel to rule out infection and gastrointestinal pathogens.  I will also do a lipase and Monospot test    Recommend supportive care with fluids, rest, flonase 1-2 sprays each nostril, otc claritin or zyrtec daily and mucinex as directed. Tylenol recommended prn for pain or fever.  Instructed pt RTO if symptoms persist or worsen over the course of the next week     20 minutes spent on physical exam and plan of care.  This note was dictated using software.    Subjective:      Patient ID: Rachael Petersen is a 33 y.o. female.    HPI    The following portions of the patient's history were reviewed and updated as appropriate: allergies, current medications, past family history, past medical history, past social history, past surgical history, and problem list.    Patient is a 33-year-old female who is presenting today with fatigue, rash, upset stomach and headache. Patient started off with having a sore throat and some fatigue. She has been throwing up and having some diarrhea for several days. Patient is having some headache and the sore throat has now resolved.     Review of Systems   Constitutional:  Positive for fatigue.   Respiratory:  Negative for shortness of breath.    Cardiovascular:  Negative for chest pain.   Gastrointestinal:  Positive for abdominal pain. Negative for constipation and diarrhea.   Genitourinary:  Negative for difficulty urinating.   Skin:  Positive for rash.   Neurological:  Positive for headaches.         Objective:      /86 (BP Location: Right arm, Patient Position: Sitting, Cuff Size: Large)   Pulse (!) 118   Temp 99.1 °F (37.3 °C)   Ht 5' 2\" (1.575 m)   Wt 106 kg (234 lb)   SpO2 98%   BMI 42.80 kg/m²          Physical Exam  Vitals and " nursing note reviewed.   Constitutional:       General: She is not in acute distress.     Appearance: Normal appearance. She is obese. She is ill-appearing.   HENT:      Head: Normocephalic and atraumatic.      Right Ear: Tympanic membrane, ear canal and external ear normal.      Left Ear: Tympanic membrane, ear canal and external ear normal.      Nose: Congestion present.      Mouth/Throat:      Mouth: Mucous membranes are moist.      Pharynx: Oropharynx is clear. No oropharyngeal exudate or posterior oropharyngeal erythema.   Eyes:      Extraocular Movements: Extraocular movements intact.      Conjunctiva/sclera: Conjunctivae normal.      Pupils: Pupils are equal, round, and reactive to light.   Cardiovascular:      Rate and Rhythm: Normal rate and regular rhythm.      Heart sounds: Normal heart sounds. No murmur heard.     No friction rub. No gallop.   Pulmonary:      Effort: Pulmonary effort is normal. No respiratory distress.      Breath sounds: Normal breath sounds. No wheezing.   Musculoskeletal:         General: Normal range of motion.      Cervical back: Normal range of motion and neck supple.   Lymphadenopathy:      Cervical: No cervical adenopathy.   Skin:     General: Skin is warm and dry.      Findings: Rash (Spotty red rash noted to upper shoulders and back.) present. No erythema.   Neurological:      General: No focal deficit present.      Mental Status: She is alert and oriented to person, place, and time. Mental status is at baseline.   Psychiatric:         Mood and Affect: Mood normal.         Behavior: Behavior normal.         Thought Content: Thought content normal.         Judgment: Judgment normal.

## 2024-01-18 ENCOUNTER — TELEMEDICINE (OUTPATIENT)
Age: 34
End: 2024-01-18

## 2024-01-18 DIAGNOSIS — F32.A ANXIETY AND DEPRESSION: Primary | ICD-10-CM

## 2024-01-18 DIAGNOSIS — F41.9 ANXIETY AND DEPRESSION: Primary | ICD-10-CM

## 2024-01-18 PROCEDURE — 90834 PSYTX W PT 45 MINUTES: CPT

## 2024-01-18 NOTE — PSYCH
"Behavioral Health Psychotherapy Progress Note    Psychotherapy Provided: Individual Psychotherapy     1. Anxiety and depression            During this session, this clinician used the following therapeutic modalities: Cognitive Behavioral Therapy, Cognitive Processing Therapy, Mindfulness-based Strategies, and Supportive Psychotherapy    Substance Abuse was not addressed during this session. If the client is diagnosed with a co-occurring substance use disorder, please indicate any changes in the frequency or amount of use: n/a. Stage of change for addressing substance use diagnoses: No substance use/Not applicable    ASSESSMENT:  Rachael Petersen presents with a Euthymic/ normal mood.     her affect is Normal range and intensity, which is congruent, with her mood and the content of the session. The client has made progress on their goals.     Rachael Petersen presents with a none risk of suicide, none risk of self-harm, and none risk of harm to others.    For any risk assessment that surpasses a \"low\" rating, a safety plan must be developed.    A safety plan was indicated: no  If yes, describe in detail n/a    PLAN: Between sessions, Rachael Petersen will continue to use coping strategies to manage anxiety and depression. Patient reports that she is struggling with her 7 year old's behavior and it has gotten to the point where her spouse had said that she wants to null the adoption. Patient reports that she doesn't want to do that and feels her spouse said it out of frustration. Patient does have an appointment set-up for tomorrow with Family Based Therapy and is hoping to get help and guidance so she and her spouse are able to parent their son successfully and keep stress down the household. Patient also expressed her sadness and disappointment about how her brother is treating her spouse and choosing to exclude her because she is vaughn. Patient reports that he feels \"dead to me\" because what he is doing is \"so " "hurtful\". Patient plans to join a LGBTQ+ group to find acceptance, connection and support. Discussed with patient her symptoms, provided supportive counseling, and allowed for patient to identify and explore her emotions. At the next session, the therapist will use Cognitive Behavioral Therapy, Cognitive Processing Therapy, Mindfulness-based Strategies, and Supportive Psychotherapy to address anxiety and depression.    Behavioral Health Treatment Plan and Discharge Planning: Rachael Petersen is aware of and agrees to continue to work on their treatment plan. They have identified and are working toward their discharge goals. yes    Visit start and stop times:    01/18/24  Start Time: 1400  Stop Time: 1448  Total Visit Time: 48 minutes      Virtual Regular Visit    Verification of patient location:    Patient is located at Other in the following state in which I hold an active license [place]      Assessment/Plan:    Problem List Items Addressed This Visit        Other    Anxiety and depression - Primary         Encounter provider Sue Cardenas LCSW    Provider located [facility] [place] [address]  [place] [address] [phone]      Recent Visits  No visits were found meeting these conditions.  Showing recent visits within past 7 days and meeting all other requirements  Today's Visits  Date Type Provider Dept   01/18/24 Telemedicine Sue Cardenas LCSW  [facility]   Showing today's visits and meeting all other requirements  Future Appointments  No visits were found meeting these conditions.  Showing future appointments within next 150 days and meeting all other requirements       The patient was identified by name and date of birth. Rachael Petersen was informed that this is a telemedicine visit and that the visit is being conducted throughthe Epic Embedded " platform. She agrees to proceed..  My office door was closed. No one else was in the room.  She acknowledged consent and understanding of privacy and security of the video platform. The patient has agreed to participate and understands they can discontinue the visit at any time.    HPI     Past Medical History:   Diagnosis Date   • Allergic within the last 3 years    [place], seasonal   • Anxiety 2011   • Arthritis    • Depression    • Migraines        Past Surgical History:   Procedure Laterality Date   • SHOULDER ARTHROSCOPY Left    • SHOULDER SURGERY     • WISDOM TOOTH EXTRACTION     • WISDOM TOOTH EXTRACTION         Current Outpatient Medications   Medication Sig Dispense Refill   • escitalopram (LEXAPRO) 20 mg tablet Take 1 tablet (20 mg total) by mouth daily 90 tablet 3   • medroxyPROGESTERone (DEPO-PROVERA) 150 mg/mL injection Inject 1 mL (150 mg total) into a muscle every 3 (three) months 1 mL 0   • meloxicam (MOBIC) 15 mg tablet Take 15 mg by mouth daily     • methylPREDNISolone 4 MG tablet therapy pack Use as directed on package     • ondansetron (ZOFRAN) 4 mg tablet Take 1 tablet (4 mg total) by mouth every 8 (eight) hours as needed for nausea or vomiting 30 tablet 0   • oxybutynin (DITROPAN) 5 mg tablet Take 1 tablet (5 mg total) by mouth in the morning 90 tablet 3   • traZODone (DESYREL) 100 mg tablet Take 1 tablet (100 mg total) by mouth daily at bedtime 90 tablet 1     No current facility-administered medications for this visit.        Allergies   Allergen Reactions   • Bupropion Hives   • Sulfamethoxazole-Trimethoprim Hives

## 2024-01-19 DIAGNOSIS — A49.9 BACTERIAL INFECTION: ICD-10-CM

## 2024-01-19 DIAGNOSIS — R79.82 ELEVATED C-REACTIVE PROTEIN (CRP): Primary | ICD-10-CM

## 2024-01-19 LAB
ALBUMIN SERPL-MCNC: 4.3 G/DL (ref 3.6–5.1)
ALBUMIN/GLOB SERPL: 1.4 (CALC) (ref 1–2.5)
ALP SERPL-CCNC: 97 U/L (ref 31–125)
ALT SERPL-CCNC: 21 U/L (ref 6–29)
AST SERPL-CCNC: 16 U/L (ref 10–30)
BASOPHILS # BLD AUTO: 99 CELLS/UL (ref 0–200)
BASOPHILS NFR BLD AUTO: 0.7 %
BILIRUB SERPL-MCNC: 0.2 MG/DL (ref 0.2–1.2)
BUN SERPL-MCNC: 12 MG/DL (ref 7–25)
BUN/CREAT SERPL: 12 (CALC) (ref 6–22)
C COLI+JEJUNI+LARI FUSA STL QL NAA+PROBE: NORMAL
CALCIUM SERPL-MCNC: 9.8 MG/DL (ref 8.6–10.2)
CHLORIDE SERPL-SCNC: 105 MMOL/L (ref 98–110)
CO2 SERPL-SCNC: 25 MMOL/L (ref 20–32)
CREAT SERPL-MCNC: 1.01 MG/DL (ref 0.5–0.97)
CRP SERPL-MCNC: 13.4 MG/L
EOSINOPHIL # BLD AUTO: 213 CELLS/UL (ref 15–500)
EOSINOPHIL NFR BLD AUTO: 1.5 %
ERYTHROCYTE [DISTWIDTH] IN BLOOD BY AUTOMATED COUNT: 12.6 % (ref 11–15)
GFR/BSA.PRED SERPLBLD CYS-BASED-ARV: 75 ML/MIN/1.73M2
GLOBULIN SER CALC-MCNC: 3.1 G/DL (CALC) (ref 1.9–3.7)
GLUCOSE SERPL-MCNC: 96 MG/DL (ref 65–139)
HCT VFR BLD AUTO: 44.9 % (ref 35–45)
HETEROPH AB SER QL LA: NEGATIVE
HGB BLD-MCNC: 14.4 G/DL (ref 11.7–15.5)
LIPASE SERPL-CCNC: 24 U/L (ref 7–60)
LYMPHOCYTES # BLD AUTO: 3294 CELLS/UL (ref 850–3900)
LYMPHOCYTES NFR BLD AUTO: 23.2 %
MCH RBC QN AUTO: 27.6 PG (ref 27–33)
MCHC RBC AUTO-ENTMCNC: 32.1 G/DL (ref 32–36)
MCV RBC AUTO: 86.2 FL (ref 80–100)
MONOCYTES # BLD AUTO: 1164 CELLS/UL (ref 200–950)
MONOCYTES NFR BLD AUTO: 8.2 %
NEUTROPHILS # BLD AUTO: 9429 CELLS/UL (ref 1500–7800)
NEUTROPHILS NFR BLD AUTO: 66.4 %
PLATELET # BLD AUTO: 368 THOUSAND/UL (ref 140–400)
PMV BLD REES-ECKER: 11 FL (ref 7.5–12.5)
POTASSIUM SERPL-SCNC: 4.1 MMOL/L (ref 3.5–5.3)
PROT SERPL-MCNC: 7.4 G/DL (ref 6.1–8.1)
RBC # BLD AUTO: 5.21 MILLION/UL (ref 3.8–5.1)
SODIUM SERPL-SCNC: 140 MMOL/L (ref 135–146)
WBC # BLD AUTO: 14.2 THOUSAND/UL (ref 3.8–10.8)

## 2024-01-19 RX ORDER — AMOXICILLIN AND CLAVULANATE POTASSIUM 875; 125 MG/1; MG/1
1 TABLET, FILM COATED ORAL EVERY 12 HOURS SCHEDULED
Qty: 14 TABLET | Refills: 0 | Status: SHIPPED | OUTPATIENT
Start: 2024-01-19 | End: 2024-01-26

## 2024-01-24 ENCOUNTER — RA CDI HCC (OUTPATIENT)
Dept: OTHER | Facility: HOSPITAL | Age: 34
End: 2024-01-24

## 2024-01-25 ENCOUNTER — CLINICAL SUPPORT (OUTPATIENT)
Dept: FAMILY MEDICINE CLINIC | Facility: CLINIC | Age: 34
End: 2024-01-25

## 2024-01-25 DIAGNOSIS — Z30.8 ENCOUNTER FOR OTHER CONTRACEPTIVE MANAGEMENT: ICD-10-CM

## 2024-01-25 PROCEDURE — 96372 THER/PROPH/DIAG INJ SC/IM: CPT | Performed by: NURSE PRACTITIONER

## 2024-01-25 RX ORDER — MEDROXYPROGESTERONE ACETATE 150 MG/ML
150 INJECTION, SUSPENSION INTRAMUSCULAR ONCE
Status: COMPLETED | OUTPATIENT
Start: 2024-01-25 | End: 2024-01-25

## 2024-01-25 RX ADMIN — MEDROXYPROGESTERONE ACETATE 150 MG: 150 INJECTION, SUSPENSION INTRAMUSCULAR at 16:58

## 2024-01-29 ENCOUNTER — OFFICE VISIT (OUTPATIENT)
Dept: FAMILY MEDICINE CLINIC | Facility: CLINIC | Age: 34
End: 2024-01-29

## 2024-01-29 VITALS
OXYGEN SATURATION: 98 % | WEIGHT: 235 LBS | DIASTOLIC BLOOD PRESSURE: 80 MMHG | HEART RATE: 98 BPM | SYSTOLIC BLOOD PRESSURE: 126 MMHG | BODY MASS INDEX: 43.24 KG/M2 | HEIGHT: 62 IN

## 2024-01-29 DIAGNOSIS — N32.81 OVERACTIVE BLADDER: ICD-10-CM

## 2024-01-29 DIAGNOSIS — F32.A ANXIETY AND DEPRESSION: Primary | ICD-10-CM

## 2024-01-29 DIAGNOSIS — M25.562 CHRONIC PAIN OF BOTH KNEES: ICD-10-CM

## 2024-01-29 DIAGNOSIS — E66.01 MORBID OBESITY WITH BMI OF 40.0-44.9, ADULT (HCC): ICD-10-CM

## 2024-01-29 DIAGNOSIS — F41.9 ANXIETY AND DEPRESSION: Primary | ICD-10-CM

## 2024-01-29 DIAGNOSIS — G89.29 CHRONIC PAIN OF BOTH KNEES: ICD-10-CM

## 2024-01-29 DIAGNOSIS — F51.01 PRIMARY INSOMNIA: ICD-10-CM

## 2024-01-29 DIAGNOSIS — M25.561 CHRONIC PAIN OF BOTH KNEES: ICD-10-CM

## 2024-01-29 PROBLEM — S09.90XA HEAD INJURY: Status: RESOLVED | Noted: 2023-08-13 | Resolved: 2024-01-29

## 2024-01-29 PROBLEM — F07.81 POST-CONCUSSION SYNDROME: Status: RESOLVED | Noted: 2023-08-15 | Resolved: 2024-01-29

## 2024-01-29 PROBLEM — J40 BRONCHITIS: Status: RESOLVED | Noted: 2023-09-21 | Resolved: 2024-01-29

## 2024-01-29 PROBLEM — S16.1XXA NECK STRAIN: Status: RESOLVED | Noted: 2023-08-13 | Resolved: 2024-01-29
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
## 2024-01-29 PROCEDURE — 99214 OFFICE O/P EST MOD 30 MIN: CPT | Performed by: NURSE PRACTITIONER

## 2024-01-29 RX ORDER — TRAZODONE HYDROCHLORIDE 100 MG/1
100 TABLET ORAL
Qty: 90 TABLET | Refills: 1 | Status: SHIPPED | OUTPATIENT
Start: 2024-01-29

## 2024-01-29 RX ORDER — ESCITALOPRAM OXALATE 20 MG/1
20 TABLET ORAL DAILY
Qty: 90 TABLET | Refills: 1 | Status: SHIPPED | OUTPATIENT
Start: 2024-01-29

## 2024-01-29 RX ORDER — OXYBUTYNIN CHLORIDE 5 MG/1
5 TABLET ORAL DAILY
Qty: 90 TABLET | Refills: 3 | Status: SHIPPED | OUTPATIENT
Start: 2024-01-29

## 2024-01-29 RX ORDER — HYDROXYZINE HYDROCHLORIDE 25 MG/1
25 TABLET, FILM COATED ORAL DAILY PRN
Qty: 90 TABLET | Refills: 0 | Status: SHIPPED | OUTPATIENT
Start: 2024-01-29

## 2024-01-29 NOTE — ASSESSMENT & PLAN NOTE
Patient reporting increased dysphoric mood and anxiety related to signs behaviors.  Patient is following with our behavioral health specialist.  Patient is interested in adding an adjunct medication.  Patient does have an allergy to bupropion.  Patient was taking Ativan in the past.  Patient is agreeable to trialing Atarax as needed.  Discussed that medication may make patient feel tired.  Discussed side effects and use with patient.  Patient denies SI/HI.  Patient is already taking Lexapro 20 mg.

## 2024-01-29 NOTE — PATIENT INSTRUCTIONS
Anxiety   WHAT YOU NEED TO KNOW:   Anxiety is a condition that causes you to feel extremely worried or nervous. The feelings are so strong that they can cause problems with your daily activities or sleep. Anxiety may be triggered by something you fear, or it may happen without a cause. Family or work stress, smoking, caffeine, and alcohol can increase your risk for anxiety. Certain medicines or health conditions can also increase your risk. Anxiety can become a long-term condition if it is not managed or treated.  DISCHARGE INSTRUCTIONS:   Call your local emergency number (911 in the ) if:   You have chest pain, tightness, or heaviness that may spread to your shoulders, arms, jaw, neck, or back.    You think about harming yourself or someone else.    Call your doctor if:   Your symptoms get worse or do not get better with treatment.    Your anxiety keeps you from doing your regular daily activities.    You have new symptoms since your last visit.    You have questions or concerns about your condition or care.    The following resources are available at any time to help you, if needed:   Contact a suicide prevention organization:        For the [employer] Suicide and Crisis Lifeline:     Call or text [employer]     Send a chat on https://Sonalightorg/chat     Call 1-972.828.6502 (1-800-273-TALK)    For the Suicide Hotline, call 1-578.571.6923 (0-634-KAMAZBQ)    For a list of international numbers: https://save.org/find-help/international-resources/  Medicines:  You may need any of the following:  Anxiety or antidepressant medicine  may help relieve or prevent anxiety. You may need to take the medicine for several weeks before you begin to feel better. Tell your healthcare provider about any side effects or problems you have with your medicine. The type or amount of medicine may need to be changed.    Take your medicine as directed.  Contact your healthcare provider if you think your medicine is not helping or if you have side  effects. Tell your provider if you are allergic to any medicine. Keep a list of the medicines, vitamins, and herbs you take. Include the amounts, and when and why you take them. Bring the list or the pill bottles to follow-up visits. Carry your medicine list with you in case of an emergency.    Cognitive behavioral therapy (CBT)  teaches you how to identify and change negative thought patterns.  Manage anxiety:   Talk to someone about your anxiety.  Your healthcare provider may suggest counseling. You might feel more comfortable talking with a friend or family member about your anxiety. Choose someone you know will be supportive and encouraging.    Get regular physical activity.  Physical activity can lower your stress, improve your mood, and help you sleep better. Work with your healthcare provider to develop a plan that you enjoy.         Create a regular sleep schedule.  A routine can help you relax before bed. Listen to music, read, or do yoga. Try to go to bed and wake up at the same time every day. Sleep is important for emotional health.    Do activities you enjoy.  Spend time with friends, or do something fun. Choose activities you are familiar with or comfortable doing. This may help prevent anxiety.    Practice deep breathing.  Deep breathing can help you relax when you feel anxious. Focus on taking slow, deep breaths several times a day, or during an anxiety attack. Breathe in through your nose and out through your mouth. Deep breathing combined with meditation or listening to music may help you feel calmer.    Do not smoke.  Nicotine and other chemicals in cigarettes and cigars can increase anxiety. Ask your healthcare provider for information if you currently smoke and need help to quit. E-cigarettes or smokeless tobacco still contain nicotine. Talk to your healthcare provider before you use these products.    Do not have caffeine.  Caffeine can make your symptoms worse. Do not have foods or drinks that  are meant to increase your energy level.    Do not drink alcohol or use drugs.  Alcohol and drugs can worsen anxiety or make it hard to manage. Talk to your therapist or healthcare provider if you need help to quit.       Follow up with your therapist or doctor as directed:  Your healthcare provider will monitor your progress at follow-up visits. Your provider will also monitor your medicine if you take antidepressants and ask if the medicine is helping. Tell your provider about any side effects or problems you have with your medicine. The type or amount of medicine may need to be changed. Write down your questions so you remember to ask them during your visits.  For more information or support:   [employer] [place] [facility]  [address]  [place] [address]  Phone: 4- 420 - 905-3402  Phone: 0- 767 - 915-4322  Web Address: http://www.marcelino.[employer]  [address] Suicide and Crisis Lifeline  [address]  [place] [address]  Phone: 4- 211 - 017  Web Address: http://www.suicidepreventionlifeline.org OR https://Breathometer.org/chat/    © Copyright [employer] 2023 Information is for End User's use only and may not be sold, redistributed or otherwise used for commercial purposes.  The above information is an  only. It is not intended as medical advice for individual conditions or treatments. Talk to your doctor, nurse or pharmacist before following any medical regimen to see if it is safe and effective for you.

## 2024-01-29 NOTE — PROGRESS NOTES
Name: Rachael Petersen      : 1990      MRN: [id number]  Encounter Provider: CLARENCE Cummins  Encounter Date: 2024   Encounter department: [facility]    Assessment & Plan     1. Anxiety and depression  Assessment & Plan:  Patient reporting increased dysphoric mood and anxiety related to signs behaviors.  Patient is following with our behavioral health specialist.  Patient is interested in adding an adjunct medication.  Patient does have an allergy to bupropion.  Patient was taking Ativan in the past.  Patient is agreeable to trialing Atarax as needed.  Discussed that medication may make patient feel tired.  Discussed side effects and use with patient.  Patient denies SI/HI.  Patient is already taking Lexapro 20 mg.    Orders:  -     hydrOXYzine HCL (ATARAX) 25 mg tablet; Take 1 tablet (25 mg total) by mouth daily as needed for itching  -     escitalopram (LEXAPRO) 20 mg tablet; Take 1 tablet (20 mg total) by mouth daily  -     traZODone (DESYREL) 100 mg tablet; Take 1 tablet (100 mg total) by mouth daily at bedtime    2. Overactive bladder  Assessment & Plan:  Well-controlled on Ditropan 5 mg.    Orders:  -     oxybutynin (DITROPAN) 5 mg tablet; Take 1 tablet (5 mg total) by mouth in the morning    3. Primary insomnia  Assessment & Plan:  Controlled on trazodone 100 mg.  Refilled today.    Orders:  -     traZODone (DESYREL) 100 mg tablet; Take 1 tablet (100 mg total) by mouth daily at bedtime    4. Morbid obesity with BMI of 40.0-44.9, adult (HCC)  Assessment & Plan:  Goal to consume 500 to 1,000 fewer calories per day for a 1-2 lbs weight loss per week. Increase exercise to 30 min, 5 times a week as tolerated for a goal of 150 mins a week. Calorie tracking apps such as myfitness pal can be helpful for keeping track of calorie intake. Decrease simple carbohydrates (white bread, pasta, white rice), and increasing  "vegetables, fruit, and protein.  Increase water.        5. Chronic pain of both knees  Assessment & Plan:  Takes Meloxicam as needed           Subjective      Patient here today for a follow-up today. She reports increased depression and anxiety recently related to her son's behaviors. She is following with our  specialist. Patient denies SI/HI. She is interested in adding an adjunct medication on at this time to help with sx.       Review of Systems   Constitutional: Negative.    HENT: Negative.     Eyes: Negative.    Respiratory: Negative.     Cardiovascular: Negative.    Gastrointestinal: Negative.    Genitourinary: Negative.    Musculoskeletal: Negative.    Neurological: Negative.    Psychiatric/Behavioral:  Positive for dysphoric mood. Negative for agitation, behavioral problems, confusion, decreased concentration, hallucinations, self-injury, sleep disturbance and suicidal ideas. The patient is nervous/anxious. The patient is not hyperactive.        Current Outpatient Medications on File Prior to Visit   Medication Sig   • medroxyPROGESTERone (DEPO-PROVERA) 150 mg/mL injection Inject 1 mL (150 mg total) into a muscle every 3 (three) months   • meloxicam (MOBIC) 15 mg tablet Take 15 mg by mouth daily   • [DISCONTINUED] escitalopram (LEXAPRO) 20 mg tablet Take 1 tablet (20 mg total) by mouth daily   • [DISCONTINUED] oxybutynin (DITROPAN) 5 mg tablet Take 1 tablet (5 mg total) by mouth in the morning   • [DISCONTINUED] traZODone (DESYREL) 100 mg tablet Take 1 tablet (100 mg total) by mouth daily at bedtime   • [DISCONTINUED] methylPREDNISolone 4 MG tablet therapy pack Use as directed on package   • [DISCONTINUED] ondansetron (ZOFRAN) 4 mg tablet Take 1 tablet (4 mg total) by mouth every 8 (eight) hours as needed for nausea or vomiting       Objective     /80 (BP Location: Right arm, Patient Position: Sitting, Cuff Size: Large)   Pulse 98   Ht 5' 2\" (1.575 m)   Wt 107 kg (235 lb)   SpO2 98%   BMI " 42.98 kg/m²     Physical Exam  Vitals and nursing note reviewed.   Constitutional:       General: She is not in acute distress.     Appearance: Normal appearance.   HENT:      Head: Normocephalic and atraumatic.      Right Ear: External ear normal.      Left Ear: External ear normal.      Nose: Nose normal.   Eyes:      General:         Right eye: No discharge.         Left eye: No discharge.      Conjunctiva/sclera: Conjunctivae normal.   Cardiovascular:      Rate and Rhythm: Normal rate and regular rhythm.      Heart sounds: Normal heart sounds. No murmur heard.  Pulmonary:      Effort: Pulmonary effort is normal.      Breath sounds: Normal breath sounds.   Abdominal:      General: Bowel sounds are normal.      Palpations: Abdomen is soft.   Musculoskeletal:         General: Normal range of motion.      Cervical back: Normal range of motion.      Right lower leg: No edema.      Left lower leg: No edema.   Lymphadenopathy:      Cervical: No cervical adenopathy.   Skin:     General: Skin is warm and dry.   Neurological:      Mental Status: She is alert and oriented to person, place, and time.   Psychiatric:         Mood and Affect: Mood normal.         Behavior: Behavior normal.         Thought Content: Thought content normal.         Judgment: Judgment normal.       CLARENCE Cummins

## 2024-01-31 ENCOUNTER — SOCIAL WORK (OUTPATIENT)
Age: 34
End: 2024-01-31

## 2024-01-31 DIAGNOSIS — F32.A ANXIETY AND DEPRESSION: Primary | ICD-10-CM

## 2024-01-31 DIAGNOSIS — F41.9 ANXIETY AND DEPRESSION: Primary | ICD-10-CM

## 2024-01-31 PROCEDURE — 90834 PSYTX W PT 45 MINUTES: CPT

## 2024-01-31 NOTE — PSYCH
"Behavioral Health Psychotherapy Progress Note    Psychotherapy Provided: Individual Psychotherapy     1. Anxiety and depression          During this session, this clinician used the following therapeutic modalities: Cognitive Behavioral Therapy, Cognitive Processing Therapy, Mindfulness-based Strategies, and Supportive Psychotherapy    Substance Abuse was not addressed during this session. If the client is diagnosed with a co-occurring substance use disorder, please indicate any changes in the frequency or amount of use: n/a. Stage of change for addressing substance use diagnoses: No substance use/Not applicable    ASSESSMENT:  Rachael Petersen presents with a Euthymic/ normal mood.     her affect is Normal range and intensity, which is congruent, with her mood and the content of the session. The client has made progress on their goals.     Rachael Petersen presents with a none risk of suicide, none risk of self-harm, and none risk of harm to others.    For any risk assessment that surpasses a \"low\" rating, a safety plan must be developed.    A safety plan was indicated: no  If yes, describe in detail n/a    PLAN: Between sessions, Rachael Petersen will continue to use coping strategies to manage anxiety and depression. Patient reports that she is still trying to look for a group / community that is \"like-minded\" and can provide social support and understanding. She tried one group but felt like a \"fish out of water\" because they were mostly \"non-binary\" or \"transgender\". Patient is going to try a \"lesbian group\" that meets monthly and possibly join a book club that is part of it. Family-based therapy has started and the patient is hopeful that it will be a good experience with positive outcomes. Worked with patient to continue reducing the frequency, intensity and duration of her anxiety and depression. Worked with patient to continue resolving the core conflicts that are the source of her anxiety and " depression. Continued to work with patient to enhance her ability to effectively cope with a variety of life's anxieties. At the next session, the therapist will use Cognitive Behavioral Therapy, Cognitive Processing Therapy, Mindfulness-based Strategies, and Supportive Psychotherapy to address anxiety and depression.    Behavioral Health Treatment Plan and Discharge Planning: Rachael Petersen is aware of and agrees to continue to work on their treatment plan. They have identified and are working toward their discharge goals. yes    Visit start and stop times:    01/31/24  Start Time: 1600  Stop Time: 1652  Total Visit Time: 52 minutes

## 2024-02-05 ENCOUNTER — OFFICE VISIT (OUTPATIENT)
Dept: SURGICAL ONCOLOGY | Facility: CLINIC | Age: 34
End: 2024-02-05
Payer: COMMERCIAL

## 2024-02-05 VITALS
TEMPERATURE: 98.4 F | HEIGHT: 62 IN | BODY MASS INDEX: 43.79 KG/M2 | WEIGHT: 238 LBS | HEART RATE: 100 BPM | DIASTOLIC BLOOD PRESSURE: 80 MMHG | OXYGEN SATURATION: 97 % | SYSTOLIC BLOOD PRESSURE: 110 MMHG

## 2024-02-05 DIAGNOSIS — Z80.3 FAMILY HISTORY OF BREAST CANCER: Primary | ICD-10-CM

## 2024-02-05 DIAGNOSIS — Z15.89 MONOALLELIC MUTATION OF CHEK2 GENE IN FEMALE PATIENT: ICD-10-CM

## 2024-02-05 DIAGNOSIS — Z91.89 INCREASED RISK OF BREAST CANCER: ICD-10-CM

## 2024-02-05 DIAGNOSIS — Z15.01 MONOALLELIC MUTATION OF CHEK2 GENE IN FEMALE PATIENT: ICD-10-CM

## 2024-02-05 DIAGNOSIS — Z15.02 MONOALLELIC MUTATION OF CHEK2 GENE IN FEMALE PATIENT: ICD-10-CM

## 2024-02-05 DIAGNOSIS — Z15.09 MONOALLELIC MUTATION OF CHEK2 GENE IN FEMALE PATIENT: ICD-10-CM

## 2024-02-05 PROCEDURE — 99213 OFFICE O/P EST LOW 20 MIN: CPT | Performed by: NURSE PRACTITIONER

## 2024-02-05 NOTE — PROGRESS NOTES
Surgical Oncology Follow Up       [address]  [facility] [place]  [address]  [facility] [address]    Rachael Petersen  1990  [id number]      Chief Complaint   Patient presents with    Follow-up       Assessment/Plan:  1. Family history of breast cancer  - MRI breast bilateral w and wo contrast w cad; Future    2. Increased risk of breast cancer  - 1 year f/u visit  - MRI breast bilateral w and wo contrast w cad; Future    3. Monoallelic mutation of CHEK2 gene in female patient  - MRI breast bilateral w and wo contrast w cad; Future  - Thyroid u/s 2025    Discussion/Summary: Patient is a 32-year-old female that carries a CHEK2 mutation and has a family history of breast cancer.  I have recommended that she be screened with annual breast MRI and will begin annual mammography in her late 30s, 10 years younger than her cousin's age at diagnosis.  She had a baseline breast MRI in August 2023 which was BI-RADS 1.  She also had a baseline thyroid ultrasound secondary to the gene mutation and a family history of thyroid cancer.  This revealed a single nodule in the right lobe that did not require biopsy or follow-up.  Consider repeat ultrasound next year.  No concerns on today's clinical exam.  Prescription given for MRI due in August.  She will receive a clinical breast exam with her gynecologist in approximately 6 months I will see her back in 1 year or sooner should the need arise.  She was instructed to contact me with any changes or concerns in the interim.  All of her questions were answered today.      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  She notices no changes on self breast exam.  She had a bilateral breast MRI in August which was benign.   She also had a thyroid ultrasound which revealed a single nodule but no follow-up imaging or biopsy was recommended.  Reports no changes in her family history.      Review of Systems:  Review of Systems   Constitutional:  Negative for chills, fatigue and fever.   HENT:  Negative for trouble swallowing and voice change.    Respiratory:  Negative for cough and shortness of breath.    Cardiovascular:  Negative for chest pain.   Hematological:  Negative for adenopathy.   Psychiatric/Behavioral:  Negative for confusion.        Patient Active Problem List   Diagnosis    Anxiety and depression    Overactive bladder    Primary insomnia    Family history of breast cancer    Elevated LDL cholesterol level    Morbid obesity with BMI of 40.0-44.9, adult (HCC)    Prediabetes    Increased risk of breast cancer    Family history of gene mutation    Chronic pain of both knees    Nausea    Thyroid nodule    Monoallelic mutation of CHEK2 gene in female patient     Past Medical History:   Diagnosis Date    Allergic within the last 3 years    Ron Pedro, seasonal    Anxiety 2011    Arthritis     Bronchitis 09/21/2023    Depression     Fall from slipping 08/13/2023    Head injury 08/13/2023    Migraines     Neck strain 08/13/2023    Post-concussion syndrome 08/15/2023     Past Surgical History:   Procedure Laterality Date    SHOULDER ARTHROSCOPY Left     SHOULDER SURGERY      WISDOM TOOTH EXTRACTION      WISDOM TOOTH EXTRACTION       Family History   Problem Relation Age of Onset    Heart failure Mother     Asthma Mother     Arthritis Mother         RA    Thyroid cancer Father 40    Heart disease Father     Thyroid disease Father     Cancer Father         thyroid    Mental illness Father     No Known Problems Maternal Grandmother     Hypertension Maternal Grandfather     Diabetes Maternal Grandfather     Diabetes Paternal Grandmother     Hypertension Paternal Grandmother     Pancreatic cancer Paternal Grandmother 60    Mental  illness Paternal Grandmother     Depression Paternal Grandmother     Stroke Paternal Grandmother     Arthritis Paternal Grandmother     Cancer Paternal Grandmother         Pancreatic    Anxiety disorder Paternal Grandmother     Suicide Attempts Paternal Grandmother     Diabetes Paternal Grandfather     No Known Problems Maternal Aunt     BRCA2 Positive Paternal Aunt     Thyroid disease Paternal Aunt     Breast cancer Paternal Aunt         late 40s, early 50s    BRCA2 Positive Paternal Aunt     Breast cancer Paternal Aunt         late 40s, early 50s    BRCA2 Positive Paternal Aunt     Breast cancer Paternal Aunt         late 40s, early 50s    Thyroid cancer Paternal Aunt 40    Thyroid disease Paternal Aunt     Cancer Paternal Aunt         Thyroid    Breast cancer Paternal Aunt     BRCA2 Positive Cousin     Breast cancer Cousin         late 40s, early 50s    BRCA2 Positive Cousin     Breast cancer Cousin         late 40s, early 50s    Breast cancer Cousin     Breast cancer Cousin     Breast cancer Cousin     Breast cancer additional onset Neg Hx     BRCA2 Negative Neg Hx     BRCA1 Positive Neg Hx     BRCA 1/2 Neg Hx     BRCA1 Negative Neg Hx     Ovarian cancer Neg Hx     Endometrial cancer Neg Hx     Colon cancer Neg Hx      Social History     Socioeconomic History    Marital status: /Civil Union     Spouse name: Not on file    Number of children: Not on file    Years of education: Not on file    Highest education level: Not on file   Occupational History    Not on file   Tobacco Use    Smoking status: Never    Smokeless tobacco: Never    Tobacco comments:     Never used   Vaping Use    Vaping status: Never Used   Substance and Sexual Activity    Alcohol use: Not Currently    Drug use: Never    Sexual activity: Yes     Partners: Female     Birth control/protection: Injection     Comment: Depo shot   Other Topics Concern    Not on file   Social History Narrative    Not on file     Social Determinants of Health      Financial Resource Strain: Not on file   Food Insecurity: Not on file   Transportation Needs: Not on file   Physical Activity: Not on file   Stress: Not on file   Social Connections: Not on file   Intimate Partner Violence: Not on file   Housing Stability: Not on file       Current Outpatient Medications:     escitalopram (LEXAPRO) 20 mg tablet, Take 1 tablet (20 mg total) by mouth daily, Disp: 90 tablet, Rfl: 1    hydrOXYzine HCL (ATARAX) 25 mg tablet, Take 1 tablet (25 mg total) by mouth daily as needed for itching, Disp: 90 tablet, Rfl: 0    medroxyPROGESTERone (DEPO-PROVERA) 150 mg/mL injection, Inject 1 mL (150 mg total) into a muscle every 3 (three) months, Disp: 1 mL, Rfl: 0    oxybutynin (DITROPAN) 5 mg tablet, Take 1 tablet (5 mg total) by mouth in the morning, Disp: 90 tablet, Rfl: 3    traZODone (DESYREL) 100 mg tablet, Take 1 tablet (100 mg total) by mouth daily at bedtime, Disp: 90 tablet, Rfl: 1    meloxicam (MOBIC) 15 mg tablet, Take 15 mg by mouth daily, Disp: , Rfl:   Allergies   Allergen Reactions    Bupropion Hives    Sulfamethoxazole-Trimethoprim Hives     Vitals:    02/05/24 0951   BP: 110/80   Pulse: 100   Temp: 98.4 °F (36.9 °C)   SpO2: 97%       Physical Exam  Vitals reviewed.   Constitutional:       Appearance: Normal appearance.   HENT:      Head: Normocephalic and atraumatic.   Neck:      Thyroid: No thyroid mass or thyromegaly.   Pulmonary:      Effort: Pulmonary effort is normal.   Chest:   Breasts:     Right: No swelling, bleeding, inverted nipple, mass, nipple discharge, skin change or tenderness.      Left: No swelling, bleeding, inverted nipple, mass, nipple discharge, skin change or tenderness.   Lymphadenopathy:      Cervical: No cervical adenopathy.      Upper Body:      Right upper body: No supraclavicular or axillary adenopathy.      Left upper body: No supraclavicular or axillary adenopathy.   Neurological:      General: No focal deficit present.      Mental Status: She is  alert and oriented to person, place, and time.   Psychiatric:         Mood and Affect: Mood normal.             Advance Care Planning/Advance Directives:  Discussed disease status and treatment goals with the patient.

## 2024-02-14 ENCOUNTER — SOCIAL WORK (OUTPATIENT)
Age: 34
End: 2024-02-14

## 2024-02-14 DIAGNOSIS — F41.9 ANXIETY AND DEPRESSION: Primary | ICD-10-CM

## 2024-02-14 DIAGNOSIS — F32.A ANXIETY AND DEPRESSION: Primary | ICD-10-CM

## 2024-02-14 PROCEDURE — 90834 PSYTX W PT 45 MINUTES: CPT

## 2024-02-14 NOTE — PSYCH
"Behavioral Health Psychotherapy Progress Note    Psychotherapy Provided: Individual Psychotherapy     1. Anxiety and depression          During this session, this clinician used the following therapeutic modalities: Cognitive Behavioral Therapy, Cognitive Processing Therapy, Mindfulness-based Strategies, and Supportive Psychotherapy    Substance Abuse was not addressed during this session. If the client is diagnosed with a co-occurring substance use disorder, please indicate any changes in the frequency or amount of use: n/a. Stage of change for addressing substance use diagnoses: No substance use/Not applicable    ASSESSMENT:  Rachael Petersen presents with a Euthymic/ normal mood.     her affect is Normal range and intensity, which is congruent, with her mood and the content of the session. The client has made progress on their goals.     Rachael Petersen presents with a none risk of suicide, none risk of self-harm, and none risk of harm to others.    For any risk assessment that surpasses a \"low\" rating, a safety plan must be developed.    A safety plan was indicated: no  If yes, describe in detail n/a    PLAN: Between sessions, Rachael Petersen will continue to use coping strategies to mange anxiety and depression. Patient reports that she is still struggling with her son, Wei, and his behaviors. Discussed with patient her symptoms, provided supportive counseling, and allowed for patient to identify and explore her emotions. At the next session, the therapist will use Cognitive Behavioral Therapy, Cognitive Processing Therapy, Mindfulness-based Strategies, and Supportive Psychotherapy to address anxiety and depression.    Behavioral Health Treatment Plan and Discharge Planning: Rachael Petersen is aware of and agrees to continue to work on their treatment plan. They have identified and are working toward their discharge goals. yes    Visit start and stop times:    02/14/24  Start Time: 1600  Stop Time: " 4076  Total Visit Time: 50 minutes

## 2024-02-16 ENCOUNTER — TELEMEDICINE (OUTPATIENT)
Dept: FAMILY MEDICINE CLINIC | Facility: CLINIC | Age: 34
End: 2024-02-16

## 2024-02-16 DIAGNOSIS — J02.9 SORE THROAT: ICD-10-CM

## 2024-02-16 DIAGNOSIS — M79.10 MYALGIA: ICD-10-CM

## 2024-02-16 DIAGNOSIS — J11.1 INFLUENZA: Primary | ICD-10-CM

## 2024-02-16 DIAGNOSIS — R09.81 CONGESTION OF NASAL SINUS: ICD-10-CM

## 2024-02-16 DIAGNOSIS — R05.1 ACUTE COUGH: ICD-10-CM

## 2024-02-16 DIAGNOSIS — R11.0 NAUSEA: ICD-10-CM

## 2024-02-16 PROCEDURE — 87804 INFLUENZA ASSAY W/OPTIC: CPT | Performed by: STUDENT IN AN ORGANIZED HEALTH CARE EDUCATION/TRAINING PROGRAM

## 2024-02-16 PROCEDURE — 87811 SARS-COV-2 COVID19 W/OPTIC: CPT | Performed by: STUDENT IN AN ORGANIZED HEALTH CARE EDUCATION/TRAINING PROGRAM

## 2024-02-16 PROCEDURE — 99213 OFFICE O/P EST LOW 20 MIN: CPT | Performed by: STUDENT IN AN ORGANIZED HEALTH CARE EDUCATION/TRAINING PROGRAM

## 2024-02-16 RX ORDER — ONDANSETRON 4 MG/1
4 TABLET, FILM COATED ORAL EVERY 8 HOURS PRN
Qty: 30 TABLET | Refills: 0 | Status: SHIPPED | OUTPATIENT
Start: 2024-02-16

## 2024-02-16 RX ORDER — OSELTAMIVIR PHOSPHATE 75 MG/1
75 CAPSULE ORAL 2 TIMES DAILY
Qty: 10 CAPSULE | Refills: 0 | Status: SHIPPED | OUTPATIENT
Start: 2024-02-16 | End: 2024-02-21

## 2024-02-16 NOTE — PROGRESS NOTES
Virtual Regular Visit    Verification of patient location:    Patient is located at Other in the following state in which I hold an active license [place]    Patient was seen car side in the parking lot of our clinic.  We were unable to establish contact through virtual means which were attempted 3 times.      Assessment/Plan:    Problem List Items Addressed This Visit       Nausea    Relevant Medications    oseltamivir (TAMIFLU) 75 mg capsule    ondansetron (ZOFRAN) 4 mg tablet     Other Visit Diagnoses       Influenza    -  Primary    Relevant Medications    oseltamivir (TAMIFLU) 75 mg capsule    Sore throat        Relevant Medications    oseltamivir (TAMIFLU) 75 mg capsule    Other Relevant Orders    POCT Rapid Covid Ag (Completed)    POCT rapid flu A and B (Completed)    Acute cough        Relevant Medications    oseltamivir (TAMIFLU) 75 mg capsule    Other Relevant Orders    POCT Rapid Covid Ag (Completed)    POCT rapid flu A and B (Completed)    Myalgia        Relevant Medications    oseltamivir (TAMIFLU) 75 mg capsule    Other Relevant Orders    POCT Rapid Covid Ag (Completed)    POCT rapid flu A and B (Completed)    Congestion of nasal sinus        Relevant Medications    oseltamivir (TAMIFLU) 75 mg capsule    Other Relevant Orders    POCT Rapid Covid Ag (Completed)    POCT rapid flu A and B (Completed)                 Patient is a 33-year-old female presenting today with congestion, cough, sore throat, body aches.  Symptoms have been present for 1 day.  She had negative flu and COVID testing however I still have a very strong suspicion for influenza due to the rapidity of symptom onset and progressive worsening over the past few hours.  I had a discussion with patient and we will place her on Tamiflu twice daily for 5 days.  I did discuss side effects with patient and to hydrate frequently.  I will also prescribe her some Zofran to be taken as needed due to the nausea that she is having.  I recommended following  up in office if not improved after treatment course and she may manage other symptoms with conservative care as below.    Recommend supportive care with fluids, rest, flonase 1-2 sprays each nostril, otc claritin or zyrtec daily and mucinex as directed. Tylenol recommended prn for pain or fever.  Instructed pt RTO if symptoms persist or worsen over the course of the next week     20 minutes spent on documentation, physical exam and plan of care.  This note was dictated using software.    Reason for visit is No chief complaint on file.       Encounter provider Rashad Sharif DO    Provider located at [facility] PARTNERSHIP [facility]  [address]  [facility] [address]      Recent Visits  No visits were found meeting these conditions.  Showing recent visits within past 7 days and meeting all other requirements  Future Appointments  No visits were found meeting these conditions.  Showing future appointments within next 150 days and meeting all other requirements       The patient was identified by name and date of birth. Rachael Petersen was informed that this is a telemedicine visit and that the visit is being conducted through the Epic Embedded platform. She agrees to proceed..  My office door was closed. No one else was in the room.  She acknowledged consent and understanding of privacy and security of the video platform. The patient has agreed to participate and understands they can discontinue the visit at any time.    Patient is aware this is a billable service.     Subjective  Racahel Petersen is a 33 y.o. female presenting for sore throat, cough, muscle aches, nausea, congestion.  Symptoms started very abruptly and she feels that she has been progressing and getting worse over the past 24 hours.  She feels nauseous and has some trouble keeping food and fluid down.  She denies any current respiratory problems but she  does cough up mucus at times.  Otherwise she can breathe fine.  No reported chest pain or ear pain.       HPI     Past Medical History:   Diagnosis Date    Allergic within the last 3 years    Ron Pedro, seasonal    Anxiety 2011    Arthritis     Bronchitis 09/21/2023    Depression     Fall from slipping 08/13/2023    Head injury 08/13/2023    Migraines     Neck strain 08/13/2023    Post-concussion syndrome 08/15/2023       Past Surgical History:   Procedure Laterality Date    SHOULDER ARTHROSCOPY Left     SHOULDER SURGERY      WISDOM TOOTH EXTRACTION      WISDOM TOOTH EXTRACTION         Current Outpatient Medications   Medication Sig Dispense Refill    ondansetron (ZOFRAN) 4 mg tablet Take 1 tablet (4 mg total) by mouth every 8 (eight) hours as needed for nausea or vomiting 30 tablet 0    oseltamivir (TAMIFLU) 75 mg capsule Take 1 capsule (75 mg total) by mouth 2 (two) times a day for 5 days 10 capsule 0    escitalopram (LEXAPRO) 20 mg tablet Take 1 tablet (20 mg total) by mouth daily 90 tablet 1    hydrOXYzine HCL (ATARAX) 25 mg tablet Take 1 tablet (25 mg total) by mouth daily as needed for itching 90 tablet 0    medroxyPROGESTERone (DEPO-PROVERA) 150 mg/mL injection Inject 1 mL (150 mg total) into a muscle every 3 (three) months 1 mL 0    meloxicam (MOBIC) 15 mg tablet Take 15 mg by mouth daily      oxybutynin (DITROPAN) 5 mg tablet Take 1 tablet (5 mg total) by mouth in the morning 90 tablet 3    traZODone (DESYREL) 100 mg tablet Take 1 tablet (100 mg total) by mouth daily at bedtime 90 tablet 1     No current facility-administered medications for this visit.        Allergies   Allergen Reactions    Bupropion Hives    Sulfamethoxazole-Trimethoprim Hives       Review of Systems   HENT:  Positive for congestion and sore throat. Negative for ear pain.    Respiratory:  Positive for cough. Negative for shortness of breath.    Cardiovascular:  Negative for chest pain.   Gastrointestinal:  Negative for  abdominal pain.   Musculoskeletal:  Positive for myalgias.   Skin:  Negative for rash.       Video Exam    There were no vitals filed for this visit.    Physical Exam  Constitutional:       General: She is not in acute distress.     Appearance: Normal appearance. She is ill-appearing. She is not toxic-appearing.   HENT:      Head: Normocephalic and atraumatic.      Right Ear: External ear normal.      Left Ear: External ear normal.      Nose: Congestion present.   Eyes:      Extraocular Movements: Extraocular movements intact.      Conjunctiva/sclera: Conjunctivae normal.      Pupils: Pupils are equal, round, and reactive to light.   Pulmonary:      Effort: Pulmonary effort is normal.   Musculoskeletal:         General: Normal range of motion.      Cervical back: Normal range of motion.   Skin:     General: Skin is dry.   Neurological:      Mental Status: She is alert and oriented to person, place, and time. Mental status is at baseline.   Psychiatric:         Mood and Affect: Mood normal.         Behavior: Behavior normal.         Thought Content: Thought content normal.         Judgment: Judgment normal.          Visit Time  Total Visit Duration: 20

## 2024-02-28 ENCOUNTER — SOCIAL WORK (OUTPATIENT)
Age: 34
End: 2024-02-28

## 2024-02-28 DIAGNOSIS — F32.A ANXIETY AND DEPRESSION: Primary | ICD-10-CM

## 2024-02-28 DIAGNOSIS — F41.9 ANXIETY AND DEPRESSION: Primary | ICD-10-CM

## 2024-02-28 PROCEDURE — 90834 PSYTX W PT 45 MINUTES: CPT

## 2024-02-28 NOTE — PSYCH
"Behavioral Health Psychotherapy Progress Note    Psychotherapy Provided: Individual Psychotherapy     1. Anxiety and depression            During this session, this clinician used the following therapeutic modalities: Cognitive Behavioral Therapy, Cognitive Processing Therapy, Mindfulness-based Strategies, and Supportive Psychotherapy    Substance Abuse was not addressed during this session. If the client is diagnosed with a co-occurring substance use disorder, please indicate any changes in the frequency or amount of use: n/a. Stage of change for addressing substance use diagnoses: No substance use/Not applicable    ASSESSMENT:  Rachael Petersen presents with a Euthymic/ normal mood.     her affect is Normal range and intensity, which is congruent, with her mood and the content of the session. The client has made progress on their goals.     Rachael Petersen presents with a none risk of suicide, none risk of self-harm, and none risk of harm to others.    For any risk assessment that surpasses a \"low\" rating, a safety plan must be developed.    A safety plan was indicated: no  If yes, describe in detail n/a    PLAN: Between sessions, Rachael Petersen will continue to use coping strategies to manage anxiety and depression. Patient reports that she was told she was promoted to a new job at work and patient is \"nervous\" and \"excited\" at the same time. Patient admitted that she is not the person that will \"ask for help\" or \"lessen her load\" if she feels like it is too much to handle. Worked with patient to continue reducing the frequency, intensity and duration of her anxiety. Worked with patient to continue resolving the core conflicts that are the source of her anxiety, which could mean not being honest about her workload and \"putting herself down\" if she makes a mistake because she doesn't want to \"let others down\". Continued to work with patient to learn to understand how how thoughts contribute to her anxiety " symptoms. Worked with patient to continue learning to change those thought patterns and reduce the likelihood and intensity of her anxiety symptoms. Continued to work with patient to learn techniques to reduce undesired behaviors associated with her anxiety.   Continued to work with patient to enhance her ability to effectively cope with a variety of life's anxieties. At the next session, the therapist will use Cognitive Behavioral Therapy, Cognitive Processing Therapy, Mindfulness-based Strategies, and Supportive Psychotherapy to address anxiety, stress at work and depression.    Behavioral Health Treatment Plan and Discharge Planning: Rachael Petersen is aware of and agrees to continue to work on their treatment plan. They have identified and are working toward their discharge goals. yes    Visit start and stop times:    02/28/24  Start Time: 1600  Stop Time: 1650  Total Visit Time: 50 minutes

## 2024-03-06 DIAGNOSIS — Z30.8 ENCOUNTER FOR OTHER CONTRACEPTIVE MANAGEMENT: ICD-10-CM

## 2024-03-06 RX ORDER — MEDROXYPROGESTERONE ACETATE 150 MG/ML
INJECTION, SUSPENSION INTRAMUSCULAR
Qty: 1 ML | Refills: 3 | Status: SHIPPED | OUTPATIENT
Start: 2024-03-06

## 2024-03-13 ENCOUNTER — SOCIAL WORK (OUTPATIENT)
Age: 34
End: 2024-03-13

## 2024-03-13 DIAGNOSIS — F32.A ANXIETY AND DEPRESSION: Primary | ICD-10-CM

## 2024-03-13 DIAGNOSIS — F41.9 ANXIETY AND DEPRESSION: Primary | ICD-10-CM

## 2024-03-13 PROCEDURE — 90834 PSYTX W PT 45 MINUTES: CPT

## 2024-03-13 NOTE — PSYCH
"Behavioral Health Psychotherapy Progress Note    Psychotherapy Provided: Individual Psychotherapy     1. Anxiety and depression          During this session, this clinician used the following therapeutic modalities: Cognitive Behavioral Therapy, Cognitive Processing Therapy, Mindfulness-based Strategies, and Supportive Psychotherapy    Substance Abuse was not addressed during this session. If the client is diagnosed with a co-occurring substance use disorder, please indicate any changes in the frequency or amount of use: n/a. Stage of change for addressing substance use diagnoses: No substance use/Not applicable    ASSESSMENT:  Rachael Petersen presents with a Euthymic/ normal mood.     her affect is Normal range and intensity, which is congruent, with her mood and the content of the session. The client has made progress on their goals.     Rachael Petersen presents with a low risk of suicide, none risk of self-harm, and none risk of harm to others.    For any risk assessment that surpasses a \"low\" rating, a safety plan must be developed.    A safety plan was indicated: no  If yes, describe in detail n/a    PLAN: Between sessions, Rachael Petersen will continue to use coping strategies to manage anxiety and depression. Worked with patient to continue reducing the frequency, intensity and duration of her anxiety. Worked with patient to continue resolving the core conflicts that are the source of her anxiety. Continued to work with patient to enhance her ability to effectively cope with a variety of life's anxieties. Worked with patient to explore her relationships and identify patterns of boundary violations and challenges. Worked collaboratively with patient to develop skills for assertive communication, setting limits, and expressing personal needs. At the next session, the therapist will use Cognitive Behavioral Therapy, Cognitive Processing Therapy, Mindfulness-based Strategies, and Supportive Psychotherapy to " address anxiety, depression and healthy boundaries.    Behavioral Health Treatment Plan and Discharge Planning: Rachael Petersen is aware of and agrees to continue to work on their treatment plan. They have identified and are working toward their discharge goals. yes    Visit start and stop times:    03/13/24  Start Time: 1600  Stop Time: 1650  Total Visit Time: 50 minutes

## 2024-04-24 ENCOUNTER — SOCIAL WORK (OUTPATIENT)
Age: 34
End: 2024-04-24

## 2024-04-24 ENCOUNTER — CLINICAL SUPPORT (OUTPATIENT)
Dept: FAMILY MEDICINE CLINIC | Facility: CLINIC | Age: 34
End: 2024-04-24

## 2024-04-24 VITALS
HEART RATE: 100 BPM | SYSTOLIC BLOOD PRESSURE: 110 MMHG | WEIGHT: 238 LBS | OXYGEN SATURATION: 98 % | DIASTOLIC BLOOD PRESSURE: 80 MMHG | HEIGHT: 62 IN | BODY MASS INDEX: 43.79 KG/M2

## 2024-04-24 DIAGNOSIS — F41.9 ANXIETY AND DEPRESSION: Primary | ICD-10-CM

## 2024-04-24 DIAGNOSIS — Z30.9 ENCOUNTER FOR CONTRACEPTIVE MANAGEMENT, UNSPECIFIED TYPE: Primary | ICD-10-CM

## 2024-04-24 DIAGNOSIS — Z30.8 ENCOUNTER FOR OTHER CONTRACEPTIVE MANAGEMENT: ICD-10-CM

## 2024-04-24 DIAGNOSIS — F32.A ANXIETY AND DEPRESSION: Primary | ICD-10-CM

## 2024-04-24 PROCEDURE — 96372 THER/PROPH/DIAG INJ SC/IM: CPT

## 2024-04-24 PROCEDURE — 90834 PSYTX W PT 45 MINUTES: CPT

## 2024-04-24 RX ORDER — MEDROXYPROGESTERONE ACETATE 150 MG/ML
150 INJECTION, SUSPENSION INTRAMUSCULAR ONCE
Status: COMPLETED | OUTPATIENT
Start: 2024-04-24 | End: 2024-04-24

## 2024-04-24 RX ADMIN — MEDROXYPROGESTERONE ACETATE 150 MG: 150 INJECTION, SUSPENSION INTRAMUSCULAR at 15:53

## 2024-04-24 NOTE — PSYCH
"Behavioral Health Psychotherapy Progress Note    Psychotherapy Provided: Individual Psychotherapy     1. Anxiety and depression            Goals addressed in session: Goal 1     DATA: Continued to work with the patient to find more adaptive ways of thinking about life problems. Worked with patient to take a practical and systematic approach to the problems in her life and find effective ways to solve them. Continued to work with patient to work through the root of her depression, helping her understand why she feels a certain way, what her triggers are for depression, and what she can do to stay healthy. Worked with patient to continue reducing the frequency, intensity and duration of her anxiety. Worked with patient to continue resolving the core conflicts that are the source of her anxiety. Continued to work with patient to enhance her ability to effectively cope with a variety of life's anxieties.     During this session, this clinician used the following therapeutic modalities: Cognitive Behavioral Therapy, Cognitive Processing Therapy, Mindfulness-based Strategies, and Supportive Psychotherapy    Substance Abuse was not addressed during this session. If the client is diagnosed with a co-occurring substance use disorder, please indicate any changes in the frequency or amount of use: n/a. Stage of change for addressing substance use diagnoses: No substance use/Not applicable    ASSESSMENT:  Rachael Petersen presents with a Euthymic/ normal mood.     her affect is Normal range and intensity, which is congruent, with her mood and the content of the session. The client has made progress on their goals.     Rachael Petersen presents with a none risk of suicide, none risk of self-harm, and none risk of harm to others.    For any risk assessment that surpasses a \"low\" rating, a safety plan must be developed.    A safety plan was indicated: no  If yes, describe in detail n/a    PLAN: Between sessions, Rachael CARLTON" Trinity will continue to use coping strategies to manage anxiety and depression. At the next session, the therapist will use Cognitive Behavioral Therapy, Cognitive Processing Therapy, Mindfulness-based Strategies, and Supportive Psychotherapy to address anxiety and depression.    Behavioral Health Treatment Plan and Discharge Planning: Rachael Petersen is aware of and agrees to continue to work on their treatment plan. They have identified and are working toward their discharge goals. yes    Visit start and stop times:    04/24/24  Start Time: 1600  Stop Time: 1652  Total Visit Time: 52 minutes

## 2024-04-24 NOTE — BH TREATMENT PLAN
Outpatient Behavioral Health Psychotherapy Treatment Plan    Rachael Petersen  1990     Date of Initial Psychotherapy Assessment: 10/25/2023   Date of Current Treatment Plan: 04/24/24  Treatment Plan Target Date: 09/24/24  Treatment Plan Expiration Date: 09/24/24    Diagnosis:   1. Anxiety and depression            Area(s) of Need: Anxiety and Depression    Long Term Goal 1 (in the client's own words):  Develop concrete skills and techniques to cope with depression, as well as reduce overall frequency, intensity and duration of the anxiety and depression so that daily functioning is not impaired.     Stage of Change: Action    Target Date for completion: n/a     Anticipated therapeutic modalities: Cognitive behavioral therapy, supportive psychotherapy, mindfulness/relaxation practices     People identified to complete this goal: Patient, Therapist, Spouse      Objective 1: (identify the means of measuring success in meeting the objective): Patient will be able to demonstrate at least 50% reduction in anxiety and depression symptoms with the use of at least 2-3 learned coping strategies each week and continued talk-therapy, as self-identified and reported         Objective 2: (identify the means of measuring success in meeting the objective): n/a      Long Term Goal 2 (in the client's own words): n/a    Stage of Change: n/a    Target Date for completion: n/a     Anticipated therapeutic modalities: n/a     People identified to complete this goal: n/a      Objective 1: (identify the means of measuring success in meeting the objective): n/a      Objective 2: (identify the means of measuring success in meeting the objective): n/a     Long Term Goal 3 (in the client's own words): n/a    Stage of Change: n/a    Target Date for completion: n/a     Anticipated therapeutic modalities: n/a     People identified to complete this goal: n/a      Objective 1: (identify the means of measuring success in meeting the  objective): n/a      Objective 2: (identify the means of measuring success in meeting the objective): n/a     I am currently under the care of a [facility] psychiatric provider: no    My [facility] psychiatric provider is: n/a    I am currently taking psychiatric medications: Yes, as prescribed    I feel that I will be ready for discharge from mental health care when I reach the following (measurable goal/objective): goals met    For children and adults who have a legal guardian:   Has there been any change to custody orders and/or guardianship status? NA. If yes, attach updated documentation.    I have created my Crisis Plan and have been offered a copy of this plan    Behavioral Health Treatment Plan [facility]: Diagnosis and Treatment Plan explained to Rachael Petersen acknowledges an understanding of their diagnosis. Rachael Petersen agrees to this treatment plan.    I have been offered a copy of this Treatment Plan. yes

## 2024-05-04 DIAGNOSIS — F32.A ANXIETY AND DEPRESSION: ICD-10-CM

## 2024-05-04 DIAGNOSIS — F41.9 ANXIETY AND DEPRESSION: ICD-10-CM

## 2024-05-05 RX ORDER — HYDROXYZINE HYDROCHLORIDE 25 MG/1
25 TABLET, FILM COATED ORAL DAILY PRN
Qty: 90 TABLET | Refills: 3 | Status: SHIPPED | OUTPATIENT
Start: 2024-05-05

## 2024-05-06 ENCOUNTER — TELEPHONE (OUTPATIENT)
Dept: FAMILY MEDICINE CLINIC | Facility: CLINIC | Age: 34
End: 2024-05-06

## 2024-05-06 NOTE — TELEPHONE ENCOUNTER
I was unable to contact Rachael using the contact number on file. I called Rachael's spouse Debby as the alternate contact and left a message that the referral Rachael requested is ready for  at the office or they may access the referral \via [facility].

## 2024-05-22 ENCOUNTER — SOCIAL WORK (OUTPATIENT)
Age: 34
End: 2024-05-22

## 2024-05-22 DIAGNOSIS — F41.9 ANXIETY AND DEPRESSION: Primary | ICD-10-CM

## 2024-05-22 DIAGNOSIS — F32.A ANXIETY AND DEPRESSION: Primary | ICD-10-CM

## 2024-05-22 PROCEDURE — 90834 PSYTX W PT 45 MINUTES: CPT

## 2024-05-22 NOTE — PSYCH
"Behavioral Health Psychotherapy Progress Note    Psychotherapy Provided: Individual Psychotherapy     1. Anxiety and depression            Goals addressed in session: Goal 1     DATA: Continued to work with the patient to find more adaptive ways of thinking about life problems. Worked with patient to take a practical and systematic approach to the problems in her life and find effective ways to solve them. Continued to work with patient to work through the root of her depression, helping her understand why she feels a certain way, what her triggers are for depression, and what she can do to stay healthy.     During this session, this clinician used the following therapeutic modalities: Cognitive Behavioral Therapy, Cognitive Processing Therapy, Mindfulness-based Strategies, and Supportive Psychotherapy    Substance Abuse was not addressed during this session. If the client is diagnosed with a co-occurring substance use disorder, please indicate any changes in the frequency or amount of use: n/a. Stage of change for addressing substance use diagnoses: No substance use/Not applicable    ASSESSMENT:  Rachael Petersen presents with a Euthymic/ normal mood.     her affect is Normal range and intensity, which is congruent, with her mood and the content of the session. The client has made progress on their goals.     Rachael Petersen presents with a none risk of suicide, none risk of self-harm, and none risk of harm to others.    For any risk assessment that surpasses a \"low\" rating, a safety plan must be developed.    A safety plan was indicated: no  If yes, describe in detail n/a    PLAN: Between sessions, Rachael Petersen will continue to use coping strategies to manage anxiety and depression. At the next session, the therapist will use Cognitive Behavioral Therapy, Cognitive Processing Therapy, Mindfulness-based Strategies, and Supportive Psychotherapy to address anxiety and depression.    Behavioral Health " Treatment Plan and Discharge Planning: Rachael Petersen is aware of and agrees to continue to work on their treatment plan. They have identified and are working toward their discharge goals. yes    Visit start and stop times:    05/22/24  Start Time: 1403  Stop Time: 1455  Total Visit Time: 52 minutes

## 2024-05-22 NOTE — BH CRISIS PLAN
Client Name: Rachael Petersen       Client YOB: 1990    [facility] Safety Plan    Creation Date: 5/22/24 Update Date: 5/22/25   Created By: Sue Cardenas LCSW       Step 1: Warning Signs:   Warning Signs   Panic feeling   Angry / Irritated   Throat tightening   Rapid heartbeat            Step 2: Internal Coping Strategies:   Internal Coping Strategies   Intentional deep breaths   Close eyes /visualize   8D audio   Calm Harm veto   Clear Fear veto            Step 3: People and social settings that provide distraction:   Name Contact Information   Nitin (spouse) [phone]   Nato (boss) [phone]   Iwona (friend) [phone]          Step 4: People whom I can ask for help during a crisis:    Name Contact Information    Nitin [phone]    Nato [phone]    Iwona [phone]      Step 5: Professionals or agencies I can contact during a crisis:    Clinican/Agency Name Phone Emergency Contact    [facility] Crisis Line 988     [facility] [phone]     [facility] Emergency Department Emergency Department Phone Emergency Department Address    [facility] [phone] [address] [place] [address]      Crisis Phone Numbers:   Suicide Prevention Lifeline: Call or Text  988 Crisis Text Line: Text HOME to 874-437   Please note: Some [facility] do not have a separate number for Child/Adolescent specific crisis. If your county is not listed under Child/Adolescent, please call the adult number for your county      Adult Crisis Numbers: Child/Adolescent Crisis Numbers   [facility]: [phone] [facility]: [phone]   [facility]: [phone] [facility]: [phone]   [facility]: [phone] [place]: [phone]   [facility]: [phone] [place] [facility]: [phone]   [place] [facility]: [phone]   [facility]: [phone]   [facility]: [phone]   [facility]: [phone]  Referred by: Deonte Griggs DO; Medical Diagnosis (from order):    Diagnosis Information      Diagnosis    338.18 (ICD-9-CM) - G89.18 (ICD-10-CM) - Post-operative pain    719.54 (ICD-9-CM) - M25.649 (ICD-10-CM) - Stiffness of hand joint, unspecified laterality                Occupational Therapy -  Daily Treatment Note    Visit:  3   Next referring provider appointment: 11/12/2020        SUBJECTIVE                                                                                                             Paperwork duties only with both hands not to exceed 1lb lifting. No heavy gripping, pinching, pushing or pulling. Do not bear weight on the bilateral hands. Keep incisions clean, covered with bandaids.     Patient reports his L hand has been feeling good and demonstrates full composite digit flexion. States his R hand continues to have limited motion due to stiffness and pain.   Functional Change: Unable to flex right fingers to palm he does report completing HEP as instructed and now wearing edema gloves    Pain / Symptoms:  Pain rating (out of 10): Current: 7   Location: Right hand 7-8/10 left 4/10    Quality / Description: burning, sore, stiff, tight.  Alleviating Factors:. Improved left    Progression since onset: improved    OBJECTIVE                                                                                                                        Wrist/Hand Circumference   Metacarpophalangeal row: Right: 23 cm   TREATMENT                                                                                                                  Therapeutic Exercise:  AROM of the bilateral fingers tendon glides  AAROM of all digits composite flexion   Individual flexion of each digit with blocking of other digits   AROM opposition bilaterally to all digits - patient continues to be unable to fully oppose thumb to fingers bilaterally       Manual Therapy:  MEM to R hand followed by AAROM of all digits to bilateral  (daytime) [phone] (after hours, weekends, holidays)      Step 6: Making the environment safer (plan for lethal means safety):   Plan: Medications . Have less in the household (only what is necessary)     Optional: What is most important to me and worth living for?   My family     [place] Safety Plan. Meseret Camacho and Juvenal Hiadlgo. Used with permission of the authors.            hands with tactile and verbal cues for relaxation   STM to scar tissue on R wrist  Intrinsic stretch to digits on the left   Passive stretch to the right and L thumb across the palm     Issued R edema glove for patient as he came to therapy with 2 L gloves and states he has been using both L hand since being issued new gloves.     Skilled input: verbal instruction/cues, tactile instruction/cues and posture correction    Writer verbally educated and received verbal consent for hand placement, positioning of patient, and techniques to be performed today from patient for therapist position for techniques and hand placement and palpation for techniques as described above and how they are pertinent to the patient's plan of care.    Home Exercise Program: Edema gloves   AROM of digits  Individual digit flexion     Ultrasound (34320)  Location: R carpal tunnel region  Position: sitting  Duty Cycle: 100%  Frequency: 3 Mhz  Intensity (w/cm2): 1.0  Duration: 8 minutes  Results: decreased pain  Reaction: no adverse reaction to treatment      ASSESSMENT                                                                                                             Patient presents with continued limited ROM of R hand and improving ROM of the L hand as patient is able to perform composite finger flexion to palm. Thumb motion continues to be limited for opposition this date. Will continue progress patient ROM as tolerated.   Pain/symptoms after session: 6    Patient Education:   Results of above outlined education: Verbalizes understanding, Demonstrates understanding and Needs reinforcement      PLAN                                                                                                                           Suggestions for next session as indicated: Progress per plan of care         Procedures and total treatment time documented Time Entry flowsheet.

## 2024-06-05 ENCOUNTER — SOCIAL WORK (OUTPATIENT)
Age: 34
End: 2024-06-05

## 2024-06-05 DIAGNOSIS — F32.A ANXIETY AND DEPRESSION: Primary | ICD-10-CM

## 2024-06-05 DIAGNOSIS — F41.9 ANXIETY AND DEPRESSION: Primary | ICD-10-CM

## 2024-06-05 PROCEDURE — 90834 PSYTX W PT 45 MINUTES: CPT

## 2024-06-05 NOTE — PSYCH
"Behavioral Health Psychotherapy Progress Note    Psychotherapy Provided: Individual Psychotherapy     1. Anxiety and depression            Goals addressed in session: Goal 1     DATA: Continued to work with patient so she doesn't become easily overwhelmed by her emotions and to lessen the negative reactions to the unpleasant feelings and situations. Continued to work with patient to help her understand how her thoughts contribute to her anxiety symptoms and learn to change those thought patterns. Continued to work with patient to develop concrete skills and techniques to cope with her anxiety. Continued to work with the patient to find more adaptive ways of thinking about life problems. Worked with patient to take a practical and systematic approach to the problems in her life and find effective ways to solve them. Continued to work with patient to work through the root of her depression, helping her understand why she feels a certain way, what her triggers are for depression, and what she can do to stay healthy.       During this session, this clinician used the following therapeutic modalities: Cognitive Behavioral Therapy, Mindfulness-based Strategies, and Supportive Psychotherapy    Substance Abuse was not addressed during this session. If the client is diagnosed with a co-occurring substance use disorder, please indicate any changes in the frequency or amount of use: n/a. Stage of change for addressing substance use diagnoses: No substance use/Not applicable    ASSESSMENT:  Rachael Petersen presents with a Euthymic/ normal mood.     her affect is Normal range and intensity, which is congruent, with her mood and the content of the session. The client has made progress on their goals.     Rachael Petersen presents with a none risk of suicide, none risk of self-harm, and none risk of harm to others.    For any risk assessment that surpasses a \"low\" rating, a safety plan must be developed.    A safety plan was " indicated: no  If yes, describe in detail n/a    PLAN: Between sessions, Rachael Petersen will continue to use coping strategies to manage anxiety and depression. At the next session, the therapist will use Cognitive Behavioral Therapy, Mindfulness-based Strategies, and Supportive Psychotherapy to address anxiety and depression.    Behavioral Health Treatment Plan and Discharge Planning: Rachael Petersen is aware of and agrees to continue to work on their treatment plan. They have identified and are working toward their discharge goals. yes    Visit start and stop times:    06/05/24  Start Time: 1603  Stop Time: 1655  Total Visit Time: 52 minutes

## 2024-06-19 ENCOUNTER — SOCIAL WORK (OUTPATIENT)
Age: 34
End: 2024-06-19

## 2024-06-19 DIAGNOSIS — F32.A ANXIETY AND DEPRESSION: Primary | ICD-10-CM

## 2024-06-19 DIAGNOSIS — F41.9 ANXIETY AND DEPRESSION: Primary | ICD-10-CM

## 2024-06-19 PROCEDURE — 90834 PSYTX W PT 45 MINUTES: CPT

## 2024-06-19 NOTE — PSYCH
"Behavioral Health Psychotherapy Progress Note    Psychotherapy Provided: Individual Psychotherapy     1. Anxiety and depression            Goals addressed in session: Goal 1     DATA: Continued to explore relationship challenges and patterns, continued to gain self-awareness and continued to develop healthy coping skills. Worked with patient to focus on personal growth, emotional healing and improve dynamics, as well as self-care.  Worked with patient to engage in self-initiated behavior to incorporate into her daily routine to focus on his well-being.  Normalized and validated feelings.  Worked with patient to help her draw on her strengths and discover and reflect on things that bring her happiness and peace.     During this session, this clinician used the following therapeutic modalities: Cognitive Behavioral Therapy, Mindfulness-based Strategies, and Supportive Psychotherapy    Substance Abuse was not addressed during this session. If the client is diagnosed with a co-occurring substance use disorder, please indicate any changes in the frequency or amount of use: n/a. Stage of change for addressing substance use diagnoses: No substance use/Not applicable    ASSESSMENT:  Rachael Petersen presents with a Euthymic/ normal mood.     her affect is Normal range and intensity, which is congruent, with her mood and the content of the session. The client has made progress on their goals.     Rachael Petersen presents with a minimal risk of suicide, minimal risk of self-harm, and none risk of harm to others.    For any risk assessment that surpasses a \"low\" rating, a safety plan must be developed.    A safety plan was indicated: no  If yes, describe in detail n/a    PLAN: Between sessions, Rachael Petersen will continue to use coping strategies to manage anxiety and depression. At the next session, the therapist will use Cognitive Behavioral Therapy, Mindfulness-based Strategies, and Supportive Psychotherapy to " address anxiety and depression.    Behavioral Health Treatment Plan and Discharge Planning: Rachael Petersen is aware of and agrees to continue to work on their treatment plan. They have identified and are working toward their discharge goals. yes    Visit start and stop times:    06/19/24  Start Time: 1600  Stop Time: 1652  Total Visit Time: 52 minutes

## 2024-06-26 ENCOUNTER — OFFICE VISIT (OUTPATIENT)
Dept: FAMILY MEDICINE CLINIC | Facility: CLINIC | Age: 34
End: 2024-06-26

## 2024-06-26 VITALS
HEART RATE: 118 BPM | HEIGHT: 62 IN | SYSTOLIC BLOOD PRESSURE: 120 MMHG | WEIGHT: 240 LBS | DIASTOLIC BLOOD PRESSURE: 88 MMHG | BODY MASS INDEX: 44.16 KG/M2

## 2024-06-26 DIAGNOSIS — L02.91 ABSCESS: ICD-10-CM

## 2024-06-26 DIAGNOSIS — L08.9 INFECTION OF SKIN: Primary | ICD-10-CM

## 2024-06-26 PROCEDURE — 99213 OFFICE O/P EST LOW 20 MIN: CPT | Performed by: STUDENT IN AN ORGANIZED HEALTH CARE EDUCATION/TRAINING PROGRAM

## 2024-06-26 RX ORDER — DOXYCYCLINE HYCLATE 100 MG
100 TABLET ORAL 2 TIMES DAILY
Qty: 20 TABLET | Refills: 0 | Status: SHIPPED | OUTPATIENT
Start: 2024-06-26 | End: 2024-07-06

## 2024-06-26 NOTE — PROGRESS NOTES
Ambulatory Visit  Name: Rachael Petersen      : 1990      MRN: [id number]  Encounter Provider: Rashad Sharif DO  Encounter Date: 2024   Encounter department: [facility] IN PARTNERSHIP WITH [facility]    Assessment & Plan   1. Infection of skin  -     doxycycline hyclate (VIBRA-TABS) 100 mg tablet; Take 1 tablet (100 mg total) by mouth 2 (two) times a day for 10 days  -     Culture, Aerobic and Anaerobic w/Gram Stain  2. Abscess  -     doxycycline hyclate (VIBRA-TABS) 100 mg tablet; Take 1 tablet (100 mg total) by mouth 2 (two) times a day for 10 days  -     Culture, Aerobic and Anaerobic w/Gram Stain         Patient is a 33-year-old female presenting today to clinic due to concern of redness and pain of the skin around the breast area.  I had a chaperone GUANACO Miller present in the room while examining the patient.  She has a 2 cm x 2 cm abscess area under the left breast below the nipple.  We took a sample of the discharge to send for culture.  I will place her on doxycycline twice daily for 10 days.  Recommended avoiding excessive sun exposure while on antibiotic    Discussed red flags to monitor for and when to seek emergency care.  She is to inform our office if symptoms begin to worsen over time.  Patient is in agreement with the plan    History of Present Illness     HPI    Review of Systems   Constitutional:  Negative for fever.   Respiratory:  Negative for shortness of breath.    Cardiovascular:  Negative for chest pain.   Skin:  Positive for rash.     Rachael Petersen is a 33 y.o. female who presents for evaluation of a possible skin infection located left breast under nipple. Symptoms include moderate pain. Patient denies fever greater than 100. Precipitating event: none known. Treatment to date has included antibiotic ointment with minimal relief.    Objective     /88 (BP Location: Left arm, Patient Position: Sitting, Cuff Size: Large)    "Pulse (!) 118   Ht 5' 2\" (1.575 m)   Wt 109 kg (240 lb)   BMI 43.90 kg/m²     Physical Exam  Vitals and nursing note reviewed.   Constitutional:       General: She is not in acute distress.     Appearance: Normal appearance. She is well-developed.   HENT:      Head: Normocephalic and atraumatic.      Right Ear: External ear normal.      Left Ear: External ear normal.      Nose: Nose normal.      Mouth/Throat:      Mouth: Mucous membranes are moist.   Eyes:      Extraocular Movements: Extraocular movements intact.      Conjunctiva/sclera: Conjunctivae normal.      Pupils: Pupils are equal, round, and reactive to light.   Cardiovascular:      Rate and Rhythm: Normal rate.   Pulmonary:      Effort: Pulmonary effort is normal. No respiratory distress.   Musculoskeletal:         General: Normal range of motion.      Cervical back: Normal range of motion.   Skin:     General: Skin is warm and dry.      Findings: Erythema (Noted abscess roughly 2 cm x 2 cm noted under left breast below nipple, white pus noted from abscess) and rash present.   Neurological:      General: No focal deficit present.      Mental Status: She is alert and oriented to person, place, and time. Mental status is at baseline.   Psychiatric:         Mood and Affect: Mood normal.         Behavior: Behavior normal.         Thought Content: Thought content normal.         Judgment: Judgment normal.       Administrative Statements   I have spent a total time of 20 minutes on 06/26/24 In caring for this patient including Instructions for management, Documenting in the medical record, and Obtaining or reviewing history  .        "

## 2024-06-27 DIAGNOSIS — Z00.6 ENCOUNTER FOR EXAMINATION FOR NORMAL COMPARISON OR CONTROL IN CLINICAL RESEARCH PROGRAM: ICD-10-CM

## 2024-06-28 ENCOUNTER — APPOINTMENT (OUTPATIENT)
Dept: LAB | Facility: CLINIC | Age: 34
End: 2024-06-28

## 2024-06-28 DIAGNOSIS — Z00.6 ENCOUNTER FOR EXAMINATION FOR NORMAL COMPARISON OR CONTROL IN CLINICAL RESEARCH PROGRAM: ICD-10-CM

## 2024-06-28 PROCEDURE — 36415 COLL VENOUS BLD VENIPUNCTURE: CPT

## 2024-07-03 ENCOUNTER — SOCIAL WORK (OUTPATIENT)
Age: 34
End: 2024-07-03

## 2024-07-03 DIAGNOSIS — F32.A ANXIETY AND DEPRESSION: Primary | ICD-10-CM

## 2024-07-03 DIAGNOSIS — F41.9 ANXIETY AND DEPRESSION: Primary | ICD-10-CM

## 2024-07-03 PROCEDURE — 90834 PSYTX W PT 45 MINUTES: CPT

## 2024-07-03 NOTE — PSYCH
"Behavioral Health Psychotherapy Progress Note    Psychotherapy Provided: Individual Psychotherapy     1. Anxiety and depression            Goals addressed in session: Goal 1     DATA: Continued to work with patient so she doesn't become easily overwhelmed by her emotions and to lessen the negative reactions to the unpleasant feelings and situations. Continued to work with patient to help her understand how her thoughts contribute to her anxiety symptoms and learn to change those thought patterns. Continued to work with patient to develop concrete skills and techniques to cope with her anxiety.    During this session, this clinician used the following therapeutic modalities: Cognitive Behavioral Therapy, Mindfulness-based Strategies, and Supportive Psychotherapy    Substance Abuse was not addressed during this session. If the client is diagnosed with a co-occurring substance use disorder, please indicate any changes in the frequency or amount of use: n/a. Stage of change for addressing substance use diagnoses: No substance use/Not applicable    ASSESSMENT:  Rachael Petersen presents with a Euthymic/ normal mood.     her affect is Normal range and intensity, which is congruent, with her mood and the content of the session. The client has made progress on their goals.     Rachael Petersen presents with a none risk of suicide, none risk of self-harm, and none risk of harm to others.    For any risk assessment that surpasses a \"low\" rating, a safety plan must be developed.    A safety plan was indicated: no  If yes, describe in detail n/a    PLAN: Between sessions, Rachael Petersen will continue to use coping strategies to manage anxiety and depression. At the next session, the therapist will use Cognitive Behavioral Therapy, Mindfulness-based Strategies, and Supportive Psychotherapy to address anxiety and depression.    Behavioral Health Treatment Plan and Discharge Planning: Rachael Petersen is aware of and agrees " to continue to work on their treatment plan. They have identified and are working toward their discharge goals. yes    Visit start and stop times:    07/03/24  Start Time: 1603  Stop Time: 1655  Total Visit Time: 52 minutes

## 2024-07-09 NOTE — PATIENT INSTRUCTIONS
"Patient Education     Routine physical for adults   The Basics   Written by the doctors and editors at [facility]   What is a physical? -- A physical is a routine visit, or \"check-up,\" with your doctor. You might also hear it called a \"wellness visit\" or \"preventive visit.\"  During each visit, the doctor will:   Ask about your physical and mental health   Ask about your habits, behaviors, and lifestyle   Do an exam   Give you vaccines if needed   Talk to you about any medicines you take   Give advice about your health   Answer your questions  Getting regular check-ups is an important part of taking care of your health. It can help your doctor find and treat any problems you have. But it's also important for preventing health problems.  A routine physical is different from a \"sick visit.\" A sick visit is when you see a doctor because of a health concern or problem. Since physicals are scheduled ahead of time, you can think about what you want to ask the doctor.  How often should I get a physical? -- It depends on your age and health. In general, for people age 21 years and older:   If you are younger than 50 years, you might be able to get a physical every 3 years.   If you are 50 years or older, your doctor might recommend a physical every year.  If you have an ongoing health condition, like diabetes or high blood pressure, your doctor will probably want to see you more often.  What happens during a physical? -- In general, each visit will include:   Physical exam - The doctor or nurse will check your height, weight, heart rate, and blood pressure. They will also look at your eyes and ears. They will ask about how you are feeling and whether you have any symptoms that bother you.   Medicines - It's a good idea to bring a list of all the medicines you take to each doctor visit. Your doctor will talk to you about your medicines and answer any questions. Tell them if you are having any side effects that bother you. You " "should also tell them if you are having trouble paying for any of your medicines.   Habits and behaviors - This includes:   Your diet   Your exercise habits   Whether you smoke, drink alcohol, or use drugs   Whether you are sexually active   Whether you feel safe at home  Your doctor will talk to you about things you can do to improve your health and lower your risk of health problems. They will also offer help and support. For example, if you want to quit smoking, they can give you advice and might prescribe medicines. If you want to improve your diet or get more physical activity, they can help you with this, too.   Lab tests, if needed - The tests you get will depend on your age and situation. For example, your doctor might want to check your:   Cholesterol   Blood sugar   Iron level   Vaccines - The recommended vaccines will depend on your age, health, and what vaccines you already had. Vaccines are very important because they can prevent certain serious or deadly infections.   Discussion of screening - \"Screening\" means checking for diseases or other health problems before they cause symptoms. Your doctor can recommend screening based on your age, risk, and preferences. This might include tests to check for:   Cancer, such as breast, prostate, cervical, ovarian, colorectal, prostate, lung, or skin cancer   Sexually transmitted infections, such as chlamydia and gonorrhea   Mental health conditions like depression and anxiety  Your doctor will talk to you about the different types of screening tests. They can help you decide which screenings to have. They can also explain what the results might mean.   Answering questions - The physical is a good time to ask the doctor or nurse questions about your health. If needed, they can refer you to other doctors or specialists, too.  Adults older than 65 years often need other care, too. As you get older, your doctor will talk to you about:   How to prevent falling at " home   Hearing or vision tests   Memory testing   How to take your medicines safely   Making sure that you have the help and support you need at home  All topics are updated as new evidence becomes available and our peer review process is complete.  This topic retrieved from [employer] on: May 02, 2024.  Topic 206968 Version 1.0  Release: 32.4.3 - C32.122  © 2024 UpToDate, Inc. and/or its affiliates. All rights reserved.  Consumer Information Use and Disclaimer   Disclaimer: This generalized information is a limited summary of diagnosis, treatment, and/or medication information. It is not meant to be comprehensive and should be used as a tool to help the user understand and/or assess potential diagnostic and treatment options. It does NOT include all information about conditions, treatments, medications, side effects, or risks that may apply to a specific patient. It is not intended to be medical advice or a substitute for the medical advice, diagnosis, or treatment of a health care provider based on the health care provider's examination and assessment of a patient's specific and unique circumstances. Patients must speak with a health care provider for complete information about their health, medical questions, and treatment options, including any risks or benefits regarding use of medications. This information does not endorse any treatments or medications as safe, effective, or approved for treating a specific patient. UpToDate, Inc. and its affiliates disclaim any warranty or liability relating to this information or the use thereof.The use of this information is governed by the Terms of Use, available at https://www.woltersTellyouwer.com/en/know/clinical-effectiveness-terms. 2024© UpToDate, Inc. and its affiliates and/or licensors. All rights reserved.  Copyright   © 2024 UpToDate, Inc. and/or its affiliates. All rights reserved.

## 2024-07-09 NOTE — PROGRESS NOTES
Assessment        Diagnoses and all orders for this visit:    Encntr for gyn exam (general) (routine) w/o abn findings    Monoallelic mutation of CHEK2 gene in female patient    Increased risk of breast cancer    Cervical cancer screening  -     Liquid-based pap, screening    History of ovarian cyst  -     US pelvis complete w transvaginal; Future    Morbid obesity with BMI of 40.0-44.9, adult (HCC)  -     Ambulatory Referral to Weight Management; Future             Plan      All questions answered.  Await pap smear results.  Educational material distributed.  Follow up in 1 year.  Follow up as needed.  Pelvic ultrasound.  To check for ovarian cysts  Discontinue Depo for now, can consider OCPS  Referred to medical weight management  Plans on diet/exercise  Continue ff up with surgical exam for breast screening      Subjective      Rachael Petersen is a 33 y.o. female who presents for annual exam.      Chief Complaint   Patient presents with    Gynecologic Exam     Yearly        he is sexually active  She has a female partner,  x 12 years  CHEK 2 variant - based on genetic oncology  She is on DEPO PROVERA due to recurrent ovarian cyst.   Last cyst 10 years ago  Has surgical oncology appt for supplemental screening.  She wants to start losing weight   Last DEPO 24, done through PCP    Last Pap: 2022  Last mammogram: 22, getting MRI annually, yearly mammo recommended in her late 30s  MRI scheduled 2025  Colorectal cancer screening:      HPV vaccine completed:no  Current contraception: none  History of abnormal Pap smear: no  History of abnormal mammogram: no  Family history of uterine or ovarian cancer: maternal GGM (unknown age)  Family history of breast cancer: dad's half sister (late 40s)  Dad's 2 cousins (late 40s, early 50s)  2 Paternal great aunts (both 50s)     Family history of colon cancer: no    OB History    Para Term  AB Living   0 0 0 0 0 0   SAB IAB Ectopic Multiple  Live Births   0 0 0 0 0   Obstetric Comments   Menarche: 9       Menstrual History:  OB History          0    Para   0    Term   0       0    AB   0    Living   0         SAB   0    IAB   0    Ectopic   0    Multiple   0    Live Births   0           Obstetric Comments   Menarche: 9              Menarche age: 9  No LMP recorded.             Past Medical History:   Diagnosis Date    Allergic within the last 3 years    Willis, Suzannen, seasonal    Anxiety     Arthritis     Bronchitis 2023    Depression     Fall from slipping 2023    Head injury 2023    Migraines     Neck strain 2023    Post-concussion syndrome 08/15/2023     Past Surgical History:   Procedure Laterality Date    SHOULDER ARTHROSCOPY Left     SHOULDER SURGERY      WISDOM TOOTH EXTRACTION      WISDOM TOOTH EXTRACTION       Family History   Problem Relation Age of Onset    Heart failure Mother     Asthma Mother     Arthritis Mother         RA    Thyroid cancer Father 40    Heart disease Father     Thyroid disease Father     Cancer Father         thyroid    Mental illness Father     No Known Problems Maternal Grandmother     Hypertension Maternal Grandfather     Diabetes Maternal Grandfather     Diabetes Paternal Grandmother     Hypertension Paternal Grandmother     Pancreatic cancer Paternal Grandmother 60    Mental illness Paternal Grandmother     Depression Paternal Grandmother     Stroke Paternal Grandmother     Arthritis Paternal Grandmother     Cancer Paternal Grandmother         Pancreatic    Anxiety disorder Paternal Grandmother     Suicide Attempts Paternal Grandmother     Diabetes Paternal Grandfather     No Known Problems Maternal Aunt     BRCA2 Positive Paternal Aunt     Thyroid disease Paternal Aunt     Breast cancer Paternal Aunt         late 40s, early 50s    BRCA2 Positive Paternal Aunt     Breast cancer Paternal Aunt         late 40s, early 50s    BRCA2 Positive Paternal Aunt     Breast  cancer Paternal Aunt         late 40s, early 50s    Thyroid cancer Paternal Aunt 40    Thyroid disease Paternal Aunt     Cancer Paternal Aunt         Thyroid    Breast cancer Paternal Aunt     BRCA2 Positive Cousin     Breast cancer Cousin         late 40s, early 50s    BRCA2 Positive Cousin     Breast cancer Cousin         late 40s, early 50s    Breast cancer Cousin     Breast cancer Cousin     Breast cancer Cousin     Breast cancer additional onset Neg Hx     BRCA2 Negative Neg Hx     BRCA1 Positive Neg Hx     BRCA 1/2 Neg Hx     BRCA1 Negative Neg Hx     Ovarian cancer Neg Hx     Endometrial cancer Neg Hx     Colon cancer Neg Hx        Social History     Tobacco Use    Smoking status: Never    Smokeless tobacco: Never    Tobacco comments:     Never used   Vaping Use    Vaping status: Never Used   Substance Use Topics    Alcohol use: Not Currently    Drug use: Never          Current Outpatient Medications:     escitalopram (LEXAPRO) 20 mg tablet, Take 1 tablet (20 mg total) by mouth daily (Patient not taking: Reported on 6/26/2024), Disp: 90 tablet, Rfl: 1    hydrOXYzine HCL (ATARAX) 25 mg tablet, TAKE 1 TABLET DAILY AS NEEDED FOR ITCHING, Disp: 90 tablet, Rfl: 3    medroxyPROGESTERone (DEPO-PROVERA) 150 mg/mL injection, INJECT 1 ML (150 MG TOTAL) INTO THE MUSCLE EVERY 3 MONTHS, Disp: 1 mL, Rfl: 3    meloxicam (MOBIC) 15 mg tablet, Take 15 mg by mouth daily, Disp: , Rfl:     oxybutynin (DITROPAN) 5 mg tablet, Take 1 tablet (5 mg total) by mouth in the morning, Disp: 90 tablet, Rfl: 3    traZODone (DESYREL) 100 mg tablet, Take 1 tablet (100 mg total) by mouth daily at bedtime, Disp: 90 tablet, Rfl: 1    Allergies   Allergen Reactions    Bupropion Hives    Sulfamethoxazole-Trimethoprim Hives           Review of Systems   Constitutional: Negative.    HENT: Negative.     Eyes: Negative.    Respiratory: Negative.     Cardiovascular: Negative.    Gastrointestinal: Negative.    Endocrine: Negative.    Genitourinary:       "   As noted in HPI   Musculoskeletal: Negative.    Skin: Negative.    Allergic/Immunologic: Negative.    Neurological: Negative.    Hematological: Negative.    Psychiatric/Behavioral: Negative.         /72   Pulse 98   Ht 5' 2\" (1.575 m)   Wt 107 kg (235 lb 12.8 oz)   SpO2 98%   BMI 43.13 kg/m²         Physical Exam  Constitutional:       Appearance: She is well-developed.   Genitourinary:      Vulva, bladder and rectum normal.      No lesions in the vagina.      Right Labia: No rash, tenderness, lesions, skin changes or Bartholin's cyst.     Left Labia: No tenderness, lesions, skin changes, Bartholin's cyst or rash.     No inguinal adenopathy present in the right or left side.     No vaginal discharge, tenderness or bleeding.      No vaginal prolapse present.     No vaginal atrophy present.       Right Adnexa: not tender, not full and no mass present.     Left Adnexa: not tender, not full and no mass present.     No cervical motion tenderness, friability, lesion or polyp.      Uterus is not enlarged or tender.      Pelvic exam was performed with patient in the lithotomy position.   Rectum:      No external hemorrhoid.   Breasts:     Right: No mass, nipple discharge, skin change or tenderness.      Left: No mass, nipple discharge, skin change or tenderness.   HENT:      Head: Normocephalic.      Nose: Nose normal.   Eyes:      Conjunctiva/sclera: Conjunctivae normal.   Neck:      Thyroid: No thyromegaly.   Cardiovascular:      Rate and Rhythm: Normal rate.   Pulmonary:      Effort: Pulmonary effort is normal.   Abdominal:      General: There is no distension.      Palpations: Abdomen is soft. There is no mass.      Tenderness: There is no abdominal tenderness. There is no guarding or rebound.   Musculoskeletal:         General: No tenderness.      Cervical back: Neck supple. No muscular tenderness.   Lymphadenopathy:      Cervical: No cervical adenopathy.      Lower Body: No right inguinal adenopathy. No " left inguinal adenopathy.   Neurological:      Mental Status: She is alert and oriented to person, place, and time.   Skin:     General: Skin is warm and dry.   Psychiatric:         Mood and Affect: Mood normal.         Behavior: Behavior normal.   Vitals and nursing note reviewed. Exam conducted with a chaperone present.             Future Appointments   Date Time Provider Department Center   7/17/2024  4:00 PM Sue Pilek, LCSW [facility]   7/26/2024 10:00 AM ALIVIA GODOYISSING NURSE KAROLINE LILIAN GODOY [facility] [place]   8/5/2024  7:00 AM CLARENCE Cummins PCP [place]   8/14/2024  4:00 PM Sue Pilek, LCSW [facility]   8/21/2024  2:15 PM OW MRI 1 OW MRI [facility]   8/28/2024  4:00 PM Sue Pilek, LCSW [facility]   9/11/2024  4:00 PM Sue Pilek, LCSW [facility]   9/25/2024  4:00 PM Sue Pilek, LCSW [facility]   10/9/2024  4:00 PM Sue Pilek, LCSW [facility]   10/23/2024  4:00 PM Sue Pilek, LCSW [facility]   11/6/2024  4:00 PM Sue Pilek, LCSW [facility]   11/20/2024  4:00 PM Sue Pilek, LCSW [facility]   12/4/2024  4:00 PM Sue Pilek, LCSW [facility]   12/18/2024  4:00 PM Sue Pilek, LCSW [facility]   2/7/2025  9:00 AM CLARENCE Mcgill SURG [facility]

## 2024-07-10 ENCOUNTER — ANNUAL EXAM (OUTPATIENT)
Dept: OBGYN CLINIC | Facility: CLINIC | Age: 34
End: 2024-07-10
Payer: COMMERCIAL

## 2024-07-10 VITALS
DIASTOLIC BLOOD PRESSURE: 72 MMHG | HEART RATE: 98 BPM | SYSTOLIC BLOOD PRESSURE: 122 MMHG | HEIGHT: 62 IN | OXYGEN SATURATION: 98 % | BODY MASS INDEX: 43.39 KG/M2 | WEIGHT: 235.8 LBS

## 2024-07-10 DIAGNOSIS — Z91.89 INCREASED RISK OF BREAST CANCER: ICD-10-CM

## 2024-07-10 DIAGNOSIS — E66.01 MORBID OBESITY WITH BMI OF 40.0-44.9, ADULT (HCC): ICD-10-CM

## 2024-07-10 DIAGNOSIS — Z87.42 HISTORY OF OVARIAN CYST: ICD-10-CM

## 2024-07-10 DIAGNOSIS — Z15.02 MONOALLELIC MUTATION OF CHEK2 GENE IN FEMALE PATIENT: ICD-10-CM

## 2024-07-10 DIAGNOSIS — Z15.09 MONOALLELIC MUTATION OF CHEK2 GENE IN FEMALE PATIENT: ICD-10-CM

## 2024-07-10 DIAGNOSIS — Z12.4 CERVICAL CANCER SCREENING: ICD-10-CM

## 2024-07-10 DIAGNOSIS — Z01.419 ENCNTR FOR GYN EXAM (GENERAL) (ROUTINE) W/O ABN FINDINGS: Primary | ICD-10-CM

## 2024-07-10 DIAGNOSIS — Z15.89 MONOALLELIC MUTATION OF CHEK2 GENE IN FEMALE PATIENT: ICD-10-CM

## 2024-07-10 DIAGNOSIS — Z15.01 MONOALLELIC MUTATION OF CHEK2 GENE IN FEMALE PATIENT: ICD-10-CM

## 2024-07-10 PROCEDURE — G0145 SCR C/V CYTO,THINLAYER,RESCR: HCPCS | Performed by: OBSTETRICS & GYNECOLOGY

## 2024-07-10 PROCEDURE — S0612 ANNUAL GYNECOLOGICAL EXAMINA: HCPCS | Performed by: OBSTETRICS & GYNECOLOGY

## 2024-07-10 PROCEDURE — G0476 HPV COMBO ASSAY CA SCREEN: HCPCS | Performed by: OBSTETRICS & GYNECOLOGY

## 2024-07-11 LAB
HPV HR 12 DNA CVX QL NAA+PROBE: NEGATIVE
HPV16 DNA CVX QL NAA+PROBE: NEGATIVE
HPV18 DNA CVX QL NAA+PROBE: NEGATIVE

## 2024-07-12 ENCOUNTER — HOSPITAL ENCOUNTER (OUTPATIENT)
Dept: ULTRASOUND IMAGING | Facility: HOSPITAL | Age: 34
End: 2024-07-12
Attending: OBSTETRICS & GYNECOLOGY
Payer: COMMERCIAL

## 2024-07-12 DIAGNOSIS — Z87.42 HISTORY OF OVARIAN CYST: ICD-10-CM

## 2024-07-12 LAB
APOB+LDLR+PCSK9 GENE MUT ANL BLD/T: NOT DETECTED
BRCA1+BRCA2 DEL+DUP + FULL MUT ANL BLD/T: NOT DETECTED
MLH1+MSH2+MSH6+PMS2 GN DEL+DUP+FUL M: NOT DETECTED

## 2024-07-12 PROCEDURE — 76856 US EXAM PELVIC COMPLETE: CPT

## 2024-07-12 PROCEDURE — 76830 TRANSVAGINAL US NON-OB: CPT

## 2024-07-15 DIAGNOSIS — F32.A ANXIETY AND DEPRESSION: ICD-10-CM

## 2024-07-15 DIAGNOSIS — F51.01 PRIMARY INSOMNIA: ICD-10-CM

## 2024-07-15 DIAGNOSIS — F41.9 ANXIETY AND DEPRESSION: ICD-10-CM

## 2024-07-15 RX ORDER — ESCITALOPRAM OXALATE 20 MG/1
20 TABLET ORAL DAILY
Qty: 90 TABLET | Refills: 3 | Status: SHIPPED | OUTPATIENT
Start: 2024-07-15

## 2024-07-15 RX ORDER — TRAZODONE HYDROCHLORIDE 100 MG/1
100 TABLET ORAL
Qty: 90 TABLET | Refills: 3 | Status: SHIPPED | OUTPATIENT
Start: 2024-07-15

## 2024-07-16 LAB
LAB AP GYN PRIMARY INTERPRETATION: NORMAL
Lab: NORMAL

## 2024-07-17 ENCOUNTER — SOCIAL WORK (OUTPATIENT)
Age: 34
End: 2024-07-17

## 2024-07-17 DIAGNOSIS — F32.A ANXIETY AND DEPRESSION: Primary | ICD-10-CM

## 2024-07-17 DIAGNOSIS — F41.9 ANXIETY AND DEPRESSION: Primary | ICD-10-CM

## 2024-07-17 PROCEDURE — 90834 PSYTX W PT 45 MINUTES: CPT

## 2024-07-17 NOTE — PSYCH
"Behavioral Health Psychotherapy Progress Note    Psychotherapy Provided: Individual Psychotherapy     1. Anxiety and depression            Goals addressed in session: Goal 1     DATA: Worked with patient to understand, identify and work through her triggers and how she can address them before, during and after a situation where she may be triggered. Worked with patient how she can focus on regulating her reaction to a stressor to help reduce the impact and then be able to reality-check her thoughts. Worked with patient to identify destructive patterns in the marital relationship, especially ones that \"disconnect them\". Continued to explore the patient's perspective of the \"root of the problem\" and what could strengthen the connection. Worked with patient to help her identify underlying issues and strategies to work on communication, compromise and commitment.         During this session, this clinician used the following therapeutic modalities: Cognitive Behavioral Therapy, Mindfulness-based Strategies, and Supportive Psychotherapy    Substance Abuse was not addressed during this session. If the client is diagnosed with a co-occurring substance use disorder, please indicate any changes in the frequency or amount of use: n/a. Stage of change for addressing substance use diagnoses: No substance use/Not applicable    ASSESSMENT:  Rachael Petersen presents with a Euthymic/ normal mood.     her affect is Normal range and intensity, which is congruent, with her mood and the content of the session. The client has made progress on their goals.     Rachael Petersen presents with a none risk of suicide, none risk of self-harm, and none risk of harm to others.    For any risk assessment that surpasses a \"low\" rating, a safety plan must be developed.    A safety plan was indicated: no  If yes, describe in detail n/a    PLAN: Between sessions, Rachael Petersen will continue to use coping strategies to manage anxiety and " depression. At the next session, the therapist will use Cognitive Behavioral Therapy, Mindfulness-based Strategies, and Supportive Psychotherapy to address anxiety and depression.    Behavioral Health Treatment Plan and Discharge Planning: Rachael Petersen is aware of and agrees to continue to work on their treatment plan. They have identified and are working toward their discharge goals. yes    Visit start and stop times:    07/17/24  Start Time: 1559  Stop Time: 1637  Total Visit Time: 38 minutes

## 2024-08-05 ENCOUNTER — OFFICE VISIT (OUTPATIENT)
Dept: FAMILY MEDICINE CLINIC | Facility: CLINIC | Age: 34
End: 2024-08-05

## 2024-08-05 VITALS
OXYGEN SATURATION: 98 % | HEART RATE: 90 BPM | DIASTOLIC BLOOD PRESSURE: 80 MMHG | SYSTOLIC BLOOD PRESSURE: 120 MMHG | BODY MASS INDEX: 43.79 KG/M2 | HEIGHT: 62 IN | WEIGHT: 238 LBS

## 2024-08-05 DIAGNOSIS — M25.561 CHRONIC PAIN OF BOTH KNEES: ICD-10-CM

## 2024-08-05 DIAGNOSIS — N32.81 OVERACTIVE BLADDER: ICD-10-CM

## 2024-08-05 DIAGNOSIS — F32.A ANXIETY AND DEPRESSION: ICD-10-CM

## 2024-08-05 DIAGNOSIS — E66.01 MORBID OBESITY WITH BMI OF 40.0-44.9, ADULT (HCC): ICD-10-CM

## 2024-08-05 DIAGNOSIS — J06.9 UPPER RESPIRATORY TRACT INFECTION, UNSPECIFIED TYPE: ICD-10-CM

## 2024-08-05 DIAGNOSIS — Z00.00 ANNUAL PHYSICAL EXAM: Primary | ICD-10-CM

## 2024-08-05 DIAGNOSIS — G89.29 CHRONIC PAIN OF BOTH KNEES: ICD-10-CM

## 2024-08-05 DIAGNOSIS — F41.9 ANXIETY AND DEPRESSION: ICD-10-CM

## 2024-08-05 DIAGNOSIS — F51.01 PRIMARY INSOMNIA: ICD-10-CM

## 2024-08-05 DIAGNOSIS — M25.562 CHRONIC PAIN OF BOTH KNEES: ICD-10-CM

## 2024-08-05 PROCEDURE — MEDROL 4MG MEDROL 4MG: Performed by: NURSE PRACTITIONER

## 2024-08-05 PROCEDURE — 99213 OFFICE O/P EST LOW 20 MIN: CPT | Performed by: NURSE PRACTITIONER

## 2024-08-05 PROCEDURE — 99395 PREV VISIT EST AGE 18-39: CPT | Performed by: NURSE PRACTITIONER

## 2024-08-05 PROCEDURE — MUCINEX DM 12 HR MUCINEX DM 12 HR: Performed by: NURSE PRACTITIONER

## 2024-08-05 RX ORDER — HYDROXYZINE 50 MG/1
50 TABLET, FILM COATED ORAL 3 TIMES DAILY PRN
Qty: 90 TABLET | Refills: 0 | Status: SHIPPED | OUTPATIENT
Start: 2024-08-05

## 2024-08-05 RX ORDER — METHYLPREDNISOLONE 4 MG/1
TABLET ORAL
Qty: 21 EACH | Refills: 0 | Status: SHIPPED | OUTPATIENT
Start: 2024-08-05

## 2024-08-05 NOTE — ASSESSMENT & PLAN NOTE
Pt reports anxiety and depression is well controlled with Lexapro 20mg and atarax as needed and trazodone 100mg at bedtime. Pt denies SI/HI.

## 2024-08-05 NOTE — ASSESSMENT & PLAN NOTE
Medrol dose pack and mucinex DM given in office.     Increase fluid intake and get plenty of rest.         If you have nasal congestions, post nasal drip, sinus pressure, runny nose you may try the following:        Clearing your sinuses in a nice steamy shower or in bathroom filled with steamy air.     Nasal saline rinses every 1-2 hours while awake may also help decrease nasal congestion, drainage.     You may try Mucinex D 12 hour version.  1/2 to 1 tablet one to two times a day as needed for nasal congestion, runny nose, post nasal drip, or cough.     If you have sore / scratch / irritated throat, you may try the following:          Warm salt water gargles every 1-2 hours while awake, throat lozenges, Tylenol and/or ibuprofen.       Please note that a cough is not necessarily a bad thing.  It is the body's way of protecting the airways.  If a cough is keeping you from sleeping at night, you may use cough suppressant such as Delsym Cough Syrup, NyQuil, Robitussin DM.       Most upper respiratory symptoms start to improve after 7-10 days but may take a few weeks to completely resolve.     You may also use Ibuprofen or Acetaminophen containing product for symptom relief.   Follow up in 1 week if your symptoms persist or worsen.    Please call the office if you have any questions.   The patient verbalized understanding of treatment plan.

## 2024-08-05 NOTE — PATIENT INSTRUCTIONS
"Patient Education     Routine physical for adults   The Basics   Written by the doctors and editors at [facility]   What is a physical? -- A physical is a routine visit, or \"check-up,\" with your doctor. You might also hear it called a \"wellness visit\" or \"preventive visit.\"  During each visit, the doctor will:   Ask about your physical and mental health   Ask about your habits, behaviors, and lifestyle   Do an exam   Give you vaccines if needed   Talk to you about any medicines you take   Give advice about your health   Answer your questions  Getting regular check-ups is an important part of taking care of your health. It can help your doctor find and treat any problems you have. But it's also important for preventing health problems.  A routine physical is different from a \"sick visit.\" A sick visit is when you see a doctor because of a health concern or problem. Since physicals are scheduled ahead of time, you can think about what you want to ask the doctor.  How often should I get a physical? -- It depends on your age and health. In general, for people age 21 years and older:   If you are younger than 50 years, you might be able to get a physical every 3 years.   If you are 50 years or older, your doctor might recommend a physical every year.  If you have an ongoing health condition, like diabetes or high blood pressure, your doctor will probably want to see you more often.  What happens during a physical? -- In general, each visit will include:   Physical exam - The doctor or nurse will check your height, weight, heart rate, and blood pressure. They will also look at your eyes and ears. They will ask about how you are feeling and whether you have any symptoms that bother you.   Medicines - It's a good idea to bring a list of all the medicines you take to each doctor visit. Your doctor will talk to you about your medicines and answer any questions. Tell them if you are having any side effects that bother you. You " "should also tell them if you are having trouble paying for any of your medicines.   Habits and behaviors - This includes:   Your diet   Your exercise habits   Whether you smoke, drink alcohol, or use drugs   Whether you are sexually active   Whether you feel safe at home  Your doctor will talk to you about things you can do to improve your health and lower your risk of health problems. They will also offer help and support. For example, if you want to quit smoking, they can give you advice and might prescribe medicines. If you want to improve your diet or get more physical activity, they can help you with this, too.   Lab tests, if needed - The tests you get will depend on your age and situation. For example, your doctor might want to check your:   Cholesterol   Blood sugar   Iron level   Vaccines - The recommended vaccines will depend on your age, health, and what vaccines you already had. Vaccines are very important because they can prevent certain serious or deadly infections.   Discussion of screening - \"Screening\" means checking for diseases or other health problems before they cause symptoms. Your doctor can recommend screening based on your age, risk, and preferences. This might include tests to check for:   Cancer, such as breast, prostate, cervical, ovarian, colorectal, prostate, lung, or skin cancer   Sexually transmitted infections, such as chlamydia and gonorrhea   Mental health conditions like depression and anxiety  Your doctor will talk to you about the different types of screening tests. They can help you decide which screenings to have. They can also explain what the results might mean.   Answering questions - The physical is a good time to ask the doctor or nurse questions about your health. If needed, they can refer you to other doctors or specialists, too.  Adults older than 65 years often need other care, too. As you get older, your doctor will talk to you about:   How to prevent falling at " home   Hearing or vision tests   Memory testing   How to take your medicines safely   Making sure that you have the help and support you need at home  All topics are updated as new evidence becomes available and our peer review process is complete.  This topic retrieved from [employer] on: May 02, 2024.  Topic 313189 Version 1.0  Release: 32.4.3 - C32.122  © 2024 UpToDate, Inc. and/or its affiliates. All rights reserved.  Consumer Information Use and Disclaimer   Disclaimer: This generalized information is a limited summary of diagnosis, treatment, and/or medication information. It is not meant to be comprehensive and should be used as a tool to help the user understand and/or assess potential diagnostic and treatment options. It does NOT include all information about conditions, treatments, medications, side effects, or risks that may apply to a specific patient. It is not intended to be medical advice or a substitute for the medical advice, diagnosis, or treatment of a health care provider based on the health care provider's examination and assessment of a patient's specific and unique circumstances. Patients must speak with a health care provider for complete information about their health, medical questions, and treatment options, including any risks or benefits regarding use of medications. This information does not endorse any treatments or medications as safe, effective, or approved for treating a specific patient. UpToDate, Inc. and its affiliates disclaim any warranty or liability relating to this information or the use thereof.The use of this information is governed by the Terms of Use, available at https://www.woltersJumpHawkuwer.com/en/know/clinical-effectiveness-terms. 2024© UpToDate, Inc. and its affiliates and/or licensors. All rights reserved.  Copyright   © 2024 UpToDate, Inc. and/or its affiliates. All rights reserved.

## 2024-08-05 NOTE — PROGRESS NOTES
Adult Annual Physical  Name: Rachael Petersen      : 1990      MRN: [id number]  Encounter Provider: CLARENCE Cummins  Encounter Date: 2024   Encounter department: [facility]SHIP [facility]    Assessment & Plan   1. Annual physical exam  -     Comprehensive metabolic panel; Future  -     CBC and differential; Future  -     Lipid Panel with Direct LDL reflex; Future  2. Anxiety and depression  Assessment & Plan:  Pt reports anxiety and depression is well controlled with Lexapro 20mg and atarax as needed and trazodone 100mg at bedtime. Pt denies SI/HI.   Orders:  -     hydrOXYzine HCL (ATARAX) 50 mg tablet; Take 1 tablet (50 mg total) by mouth 3 (three) times a day as needed for itching  3. Overactive bladder  Assessment & Plan:  Well-controlled on Ditropan 5 mg.  4. Chronic pain of both knees  Assessment & Plan:  Takes meloxicam as needed. Advise weight loss.   5. Primary insomnia  Assessment & Plan:  Increased atarax to 50mg at this time. Pt takes Trazodone 100mg.   6. Morbid obesity with BMI of 40.0-44.9, adult (HCC)  Assessment & Plan:  Goal to consume 500 to 1,000 fewer calories per day for a 1-2 lbs weight loss per week. Increase exercise to 30 min, 5 times a week as tolerated for a goal of 150 mins a week. Calorie tracking apps such as myfitness pal can be helpful for keeping track of calorie intake. Decrease simple carbohydrates (white bread, pasta, white rice), and increasing vegetables, fruit, and protein.  Increase water.    7. Upper respiratory tract infection, unspecified type  Assessment & Plan:  Medrol dose pack and mucinex DM given in office.     Increase fluid intake and get plenty of rest.         If you have nasal congestions, post nasal drip, sinus pressure, runny nose you may try the following:        Clearing your sinuses in a nice steamy shower or in bathroom filled with steamy air.     Nasal saline rinses every 1-2  hours while awake may also help decrease nasal congestion, drainage.     You may try Mucinex D 12 hour version.  1/2 to 1 tablet one to two times a day as needed for nasal congestion, runny nose, post nasal drip, or cough.     If you have sore / scratch / irritated throat, you may try the following:          Warm salt water gargles every 1-2 hours while awake, throat lozenges, Tylenol and/or ibuprofen.       Please note that a cough is not necessarily a bad thing.  It is the body's way of protecting the airways.  If a cough is keeping you from sleeping at night, you may use cough suppressant such as Delsym Cough Syrup, NyQuil, Robitussin DM.       Most upper respiratory symptoms start to improve after 7-10 days but may take a few weeks to completely resolve.     You may also use Ibuprofen or Acetaminophen containing product for symptom relief.   Follow up in 1 week if your symptoms persist or worsen.    Please call the office if you have any questions.   The patient verbalized understanding of treatment plan.     Orders:  -     methylPREDNISolone 4 MG tablet therapy pack; Use as directed on package  -     dextromethorphan-guaifenesin (MUCINEX DM)  MG per 12 hr tablet; Take 1 tablet by mouth every 12 (twelve) hours  Immunizations and preventive care screenings were discussed with patient today. Appropriate education was printed on patient's after visit summary.    Counseling:  Alcohol/drug use: discussed moderation in alcohol intake, the recommendations for healthy alcohol use, and avoidance of illicit drug use.  Dental Health: discussed importance of regular tooth brushing, flossing, and dental visits.  Injury prevention: discussed safety/seat belts, safety helmets, smoke detectors, carbon dioxide detectors, and smoking near bedding or upholstery.  Sexual health: discussed sexually transmitted diseases, partner selection, use of condoms, avoidance of unintended pregnancy, and contraceptive  "alternatives.  Exercise: the importance of regular exercise/physical activity was discussed. Recommend exercise 3-5 times per week for at least 30 minutes.       Depression Screening and Follow-up Plan: Patient's depression screening was positive with a PHQ-9 score of 6. Continue regular follow-up with their mental health provider who is managing their mental health condition(s).         History of Present Illness     Adult Annual Physical:  Patient presents for annual physical.     Diet and Physical Activity:  - Diet/Nutrition: well balanced diet.  - Exercise: no formal exercise.    Depression Screening:    - PHQ-9 Score: 6    General Health:  - Sleep: sleeps well.  - Hearing: normal hearing bilateral ears.  - Vision: no vision problems.  - Dental: regular dental visits.    /GYN Health:  - Follows with GYN: yes.   - Menopause: premenopausal.     Review of Systems   Constitutional: Negative.  Negative for chills and fever.   HENT: Negative.  Negative for ear pain and sore throat.    Eyes: Negative.  Negative for pain and visual disturbance.   Respiratory: Negative.  Negative for cough and shortness of breath.    Cardiovascular: Negative.  Negative for chest pain and palpitations.   Gastrointestinal: Negative.  Negative for abdominal pain and vomiting.   Genitourinary:  Negative for dysuria and hematuria.   Musculoskeletal: Negative.  Negative for arthralgias and back pain.   Skin: Negative.  Negative for color change and rash.   Neurological: Negative.  Negative for seizures and syncope.   Psychiatric/Behavioral: Negative.  Negative for suicidal ideas.    All other systems reviewed and are negative.        Objective     /80 (BP Location: Right arm, Patient Position: Sitting, Cuff Size: Large)   Pulse 90   Ht 5' 2\" (1.575 m)   Wt 108 kg (238 lb)   SpO2 98%   BMI 43.53 kg/m²     Physical Exam  Vitals and nursing note reviewed.   Constitutional:       General: She is not in acute distress.     Appearance: " She is well-developed.   HENT:      Head: Normocephalic and atraumatic.      Right Ear: Tympanic membrane, ear canal and external ear normal.      Left Ear: Tympanic membrane, ear canal and external ear normal.      Nose: Nose normal.      Mouth/Throat:      Mouth: Mucous membranes are moist.      Pharynx: Oropharynx is clear.   Eyes:      General:         Right eye: No discharge.         Left eye: No discharge.      Conjunctiva/sclera: Conjunctivae normal.      Pupils: Pupils are equal, round, and reactive to light.   Cardiovascular:      Rate and Rhythm: Normal rate and regular rhythm.      Heart sounds: No murmur heard.  Pulmonary:      Effort: Pulmonary effort is normal. No respiratory distress.      Breath sounds: Normal breath sounds.   Abdominal:      General: Bowel sounds are normal.      Palpations: Abdomen is soft.      Tenderness: There is no abdominal tenderness.   Musculoskeletal:         General: No swelling.      Cervical back: Neck supple.      Right lower leg: No edema.      Left lower leg: No edema.   Skin:     General: Skin is warm and dry.      Capillary Refill: Capillary refill takes less than 2 seconds.   Neurological:      Mental Status: She is alert and oriented to person, place, and time.   Psychiatric:         Mood and Affect: Mood normal.         Behavior: Behavior normal.         Thought Content: Thought content normal.         Judgment: Judgment normal.       Administrative Statements   I have spent a total time of 38 minutes in caring for this patient on the day of the visit/encounter including Instructions for management, Patient and family education, Importance of tx compliance, Risk factor reductions, Impressions, Counseling / Coordination of care, and Obtaining or reviewing history  .

## 2024-08-14 ENCOUNTER — SOCIAL WORK (OUTPATIENT)
Age: 34
End: 2024-08-14

## 2024-08-14 DIAGNOSIS — F41.9 ANXIETY AND DEPRESSION: Primary | ICD-10-CM

## 2024-08-14 DIAGNOSIS — F32.A ANXIETY AND DEPRESSION: Primary | ICD-10-CM

## 2024-08-14 PROCEDURE — 90834 PSYTX W PT 45 MINUTES: CPT

## 2024-08-14 NOTE — PSYCH
"Behavioral Health Psychotherapy Progress Note    Psychotherapy Provided: Individual Psychotherapy     1. Anxiety and depression            Goals addressed in session: Goal 1     DATA: Worked with patient to understand, identify and work through her triggers and how she can address them before, during and after a situation where she may be triggered. Worked with patient how she can focus on regulating her reaction to a stressor to help reduce the impact and then be able to reality-check her thoughts.  During this session, this clinician used the following therapeutic modalities: Cognitive Behavioral Therapy, Mindfulness-based Strategies, and Supportive Psychotherapy    Substance Abuse was not addressed during this session. If the client is diagnosed with a co-occurring substance use disorder, please indicate any changes in the frequency or amount of use: n/a. Stage of change for addressing substance use diagnoses: No substance use/Not applicable    ASSESSMENT:  Rachael Petersen presents with a Euthymic/ normal mood.     her affect is Normal range and intensity, which is congruent, with her mood and the content of the session. The client has made progress on their goals.     Rachael Petersen presents with a none risk of suicide, none risk of self-harm, and none risk of harm to others.    For any risk assessment that surpasses a \"low\" rating, a safety plan must be developed.    A safety plan was indicated: no  If yes, describe in detail n/a    PLAN: Between sessions, Rachael Petersen will continue to use coping strategies to manage anxiety and depression. At the next session, the therapist will use Cognitive Behavioral Therapy, Mindfulness-based Strategies, and Supportive Psychotherapy to address anxiety and depression.    Behavioral Health Treatment Plan and Discharge Planning: Rachael Petersen is aware of and agrees to continue to work on their treatment plan. They have identified and are working toward their " discharge goals. yes    Visit start and stop times:    08/14/24  Start Time: 1600  Stop Time: 1652  Total Visit Time: 52 minutes

## 2024-08-21 ENCOUNTER — HOSPITAL ENCOUNTER (OUTPATIENT)
Dept: MRI IMAGING | Facility: HOSPITAL | Age: 34
Discharge: HOME | End: 2024-08-21
Payer: COMMERCIAL

## 2024-08-21 VITALS — BODY MASS INDEX: 42.33 KG/M2 | WEIGHT: 230 LBS | HEIGHT: 62 IN

## 2024-08-21 DIAGNOSIS — Z15.02 MONOALLELIC MUTATION OF CHEK2 GENE IN FEMALE PATIENT: ICD-10-CM

## 2024-08-21 DIAGNOSIS — Z15.09 MONOALLELIC MUTATION OF CHEK2 GENE IN FEMALE PATIENT: ICD-10-CM

## 2024-08-21 DIAGNOSIS — Z15.01 MONOALLELIC MUTATION OF CHEK2 GENE IN FEMALE PATIENT: ICD-10-CM

## 2024-08-21 DIAGNOSIS — Z80.3 FAMILY HISTORY OF BREAST CANCER: ICD-10-CM

## 2024-08-21 DIAGNOSIS — Z15.89 MONOALLELIC MUTATION OF CHEK2 GENE IN FEMALE PATIENT: ICD-10-CM

## 2024-08-21 DIAGNOSIS — Z91.89 INCREASED RISK OF BREAST CANCER: ICD-10-CM

## 2024-08-21 PROCEDURE — C8908 MRI W/O FOL W/CONT, BREAST,: HCPCS

## 2024-08-21 PROCEDURE — 77049 MRI BREAST C-+ W/CAD BI: CPT

## 2024-08-21 PROCEDURE — G1004 CDSM NDSC: HCPCS

## 2024-08-21 PROCEDURE — A9585 GADOBUTROL INJECTION: HCPCS | Performed by: NURSE PRACTITIONER

## 2024-08-21 RX ORDER — GADOBUTROL 604.72 MG/ML
10 INJECTION INTRAVENOUS
Status: COMPLETED | OUTPATIENT
Start: 2024-08-21 | End: 2024-08-21

## 2024-08-21 RX ADMIN — GADOBUTROL 10 ML: 604.72 INJECTION INTRAVENOUS at 14:53

## 2024-08-28 ENCOUNTER — SOCIAL WORK (OUTPATIENT)
Age: 34
End: 2024-08-28

## 2024-08-28 DIAGNOSIS — F32.A ANXIETY AND DEPRESSION: Primary | ICD-10-CM

## 2024-08-28 DIAGNOSIS — F41.9 ANXIETY AND DEPRESSION: Primary | ICD-10-CM

## 2024-08-28 PROCEDURE — 90834 PSYTX W PT 45 MINUTES: CPT

## 2024-08-28 NOTE — PSYCH
"Behavioral Health Psychotherapy Progress Note    Psychotherapy Provided: Individual Psychotherapy     1. Anxiety and depression            Goals addressed in session: Goal 1     DATA: Continued to explore relationship challenges and patterns, continued to gain self-awareness and continued to develop healthy coping skills. Worked with patient to focus on personal growth, emotional healing and improve dynamics, as well as self-care.  Worked with patient to engage in self-initiated behavior to incorporate into her daily routine to focus on his well-being.  Normalized and validated feelings.  Worked with patient to help her draw on her strengths and discover and reflect on things that bring her happiness and peace. Worked with patient to explore her relationships and identify patterns of boundary violations and challenges. Worked collaboratively with patient to develop skills for assertive communication, setting limits, and expressing personal needs.       During this session, this clinician used the following therapeutic modalities: Cognitive Behavioral Therapy, Mindfulness-based Strategies, and Supportive Psychotherapy    Substance Abuse was not addressed during this session. If the client is diagnosed with a co-occurring substance use disorder, please indicate any changes in the frequency or amount of use: n/a. Stage of change for addressing substance use diagnoses: No substance use/Not applicable    ASSESSMENT:  Rachael Petersen presents with a Euthymic/ normal mood.     her affect is Normal range and intensity, which is congruent, with her mood and the content of the session. The client has made progress on their goals.     Rachael Petersen presents with a none risk of suicide, none risk of self-harm, and none risk of harm to others.    For any risk assessment that surpasses a \"low\" rating, a safety plan must be developed.    A safety plan was indicated: no  If yes, describe in detail n/a    PLAN: Between " sessions, Rachael Petersen will continue to use coping strategies to manage anxiety and depression. At the next session, the therapist will use Cognitive Behavioral Therapy, Mindfulness-based Strategies, and Supportive Psychotherapy to address anxiety and depression.    Behavioral Health Treatment Plan and Discharge Planning: Rachael Petersen is aware of and agrees to continue to work on their treatment plan. They have identified and are working toward their discharge goals. yes    Visit start and stop times:    08/28/24  Start Time: 1600  Stop Time: 1652  Total Visit Time: 52 minutes

## 2024-09-10 NOTE — PROGRESS NOTES
Ambulatory Visit  Name: Rachael Petersen      : 1990      MRN: [id number]  Encounter Provider: Rashad Sharif DO  Encounter Date: 2024   Encounter department: [facility] IN PARTNERSHIP WITH [facility]    Assessment & Plan  Fatigue, unspecified type  Patient having 1 month of fatigue, excess daytime sleepiness, increased length of napping.  She feels significantly more tired and harder to do her daily activities.  She is having trouble sleeping at nighttime due to napping during the day.  She does report a history of reported loud snoring from her partner.  I will order a baseline lab panel to rule out medical causes of fatigue and will refer patient to sleep medicine for a sleep study/sleep apnea evaluation.  Orders:    CBC and differential    Comprehensive metabolic panel    TSH, 3rd generation with Free T4 reflex    Vitamin D 25 hydroxy    Vitamin B12/Folate, Serum Panel    POCT Rapid Covid Ag    C-reactive protein    Ambulatory Referral to Sleep Medicine; Future    Loud snoring    Orders:    Ambulatory Referral to Sleep Medicine; Future         Is a 33-year-old female who is presenting today for an acute visit due to fatigue and daytime sleepiness.  See problem based charting as above for full details and workup/plan of care.  I will address any lab abnormalities with patient that may be causing fatigue.  I recommended an evaluation by a sleep specialist to rule in/out sleep apnea.  Suspicion is high for sleep apnea at this point.    History of Present Illness     HPI    History obtained from : patient  Review of Systems   Constitutional:  Positive for fatigue. Negative for chills and fever.        Daytime sleepiness   Respiratory:  Negative for shortness of breath.    Cardiovascular:  Negative for chest pain.   Gastrointestinal:  Negative for abdominal pain.   Genitourinary:  Negative for difficulty urinating.   Skin:  Negative for rash.       Patient  "complains of fatigue. Symptoms began about a month ago. The patient feels the fatigue began with: a witnessed or suspected sleep apnea. Symptoms of her fatigue have been general malaise. Patient describes the following psychological symptoms: none. Patient denies change in hair texture, constipation, fever, unusual rashes, and witnessed or suspected sleep apnea. Symptoms have gradually worsened. Symptom severity: struggles to carry out day to day responsibilities.. Previous visits for this problem: none.     She does report that she has been told that she snores at night and has had nighttime waking up.  She has been napping up to 2-1/2 hours now which is significantly longer than she used to nap.    Objective     /74 (BP Location: Left arm, Patient Position: Sitting, Cuff Size: Large)   Pulse 105   Ht 5' 2\" (1.575 m)   Wt 109 kg (240 lb)   SpO2 96%   BMI 43.90 kg/m²     Physical Exam  Vitals and nursing note reviewed.   Constitutional:       General: She is not in acute distress.     Appearance: Normal appearance. She is well-developed. She is obese.   HENT:      Head: Normocephalic and atraumatic.      Right Ear: Tympanic membrane, ear canal and external ear normal. There is no impacted cerumen.      Left Ear: Tympanic membrane, ear canal and external ear normal. There is no impacted cerumen.      Nose: Nose normal. No congestion or rhinorrhea.      Mouth/Throat:      Mouth: Mucous membranes are moist.      Pharynx: No oropharyngeal exudate or posterior oropharyngeal erythema.   Eyes:      Extraocular Movements: Extraocular movements intact.      Conjunctiva/sclera: Conjunctivae normal.      Pupils: Pupils are equal, round, and reactive to light.   Cardiovascular:      Rate and Rhythm: Normal rate and regular rhythm.      Heart sounds: Normal heart sounds. No murmur heard.  Pulmonary:      Effort: Pulmonary effort is normal. No respiratory distress.      Breath sounds: Normal breath sounds. No wheezing. "   Musculoskeletal:         General: Normal range of motion.      Cervical back: Normal range of motion.   Skin:     General: Skin is warm and dry.      Findings: No rash.   Neurological:      General: No focal deficit present.      Mental Status: She is alert. Mental status is at baseline.   Psychiatric:         Mood and Affect: Mood normal.         Behavior: Behavior normal.         Thought Content: Thought content normal.         Judgment: Judgment normal.       Administrative Statements   I have spent a total time of 20 minutes in caring for this patient on the day of the visit/encounter including Instructions for management, Documenting in the medical record, and Obtaining or reviewing history  .

## 2024-09-11 ENCOUNTER — OFFICE VISIT (OUTPATIENT)
Dept: FAMILY MEDICINE CLINIC | Facility: CLINIC | Age: 34
End: 2024-09-11

## 2024-09-11 ENCOUNTER — SOCIAL WORK (OUTPATIENT)
Age: 34
End: 2024-09-11

## 2024-09-11 VITALS
SYSTOLIC BLOOD PRESSURE: 100 MMHG | BODY MASS INDEX: 44.16 KG/M2 | DIASTOLIC BLOOD PRESSURE: 74 MMHG | OXYGEN SATURATION: 96 % | WEIGHT: 240 LBS | HEART RATE: 105 BPM | HEIGHT: 62 IN

## 2024-09-11 DIAGNOSIS — R53.83 FATIGUE, UNSPECIFIED TYPE: Primary | ICD-10-CM

## 2024-09-11 DIAGNOSIS — F41.9 ANXIETY AND DEPRESSION: Primary | ICD-10-CM

## 2024-09-11 DIAGNOSIS — F32.A ANXIETY AND DEPRESSION: Primary | ICD-10-CM

## 2024-09-11 DIAGNOSIS — R06.83 LOUD SNORING: ICD-10-CM

## 2024-09-11 LAB
SARS-COV-2 AG UPPER RESP QL IA: NEGATIVE
VALID CONTROL: NORMAL

## 2024-09-11 PROCEDURE — 99213 OFFICE O/P EST LOW 20 MIN: CPT | Performed by: STUDENT IN AN ORGANIZED HEALTH CARE EDUCATION/TRAINING PROGRAM

## 2024-09-11 PROCEDURE — 90834 PSYTX W PT 45 MINUTES: CPT

## 2024-09-11 PROCEDURE — 36415 COLL VENOUS BLD VENIPUNCTURE: CPT | Performed by: STUDENT IN AN ORGANIZED HEALTH CARE EDUCATION/TRAINING PROGRAM

## 2024-09-11 PROCEDURE — 87811 SARS-COV-2 COVID19 W/OPTIC: CPT | Performed by: STUDENT IN AN ORGANIZED HEALTH CARE EDUCATION/TRAINING PROGRAM

## 2024-09-11 NOTE — ASSESSMENT & PLAN NOTE
Patient having 1 month of fatigue, excess daytime sleepiness, increased length of napping.  She feels significantly more tired and harder to do her daily activities.  She is having trouble sleeping at nighttime due to napping during the day.  She does report a history of reported loud snoring from her partner.  I will order a baseline lab panel to rule out medical causes of fatigue and will refer patient to sleep medicine for a sleep study/sleep apnea evaluation.  Orders:    CBC and differential    Comprehensive metabolic panel    TSH, 3rd generation with Free T4 reflex    Vitamin D 25 hydroxy    Vitamin B12/Folate, Serum Panel    POCT Rapid Covid Ag    C-reactive protein    Ambulatory Referral to Sleep Medicine; Future

## 2024-09-11 NOTE — PSYCH
"Behavioral Health Psychotherapy Progress Note    Psychotherapy Provided: Individual Psychotherapy     1. Anxiety and depression            Goals addressed in session: Goal 1     DATA: Continued to work with the patient to find more adaptive ways of thinking about life problems. Worked with patient to take a practical and systematic approach to the problems in her life and find effective ways to solve them. Continued to work with patient to work through the root of her depression, helping her understand why she feels a certain way, what her triggers are for depression, and what she can do to stay healthy. Discussed with patient her symptoms, provided supportive counseling, and allowed for patient to identify and explore her emotions.     During this session, this clinician used the following therapeutic modalities: Cognitive Behavioral Therapy, Mindfulness-based Strategies, and Supportive Psychotherapy    Substance Abuse was not addressed during this session. If the client is diagnosed with a co-occurring substance use disorder, please indicate any changes in the frequency or amount of use: n/a. Stage of change for addressing substance use diagnoses: No substance use/Not applicable    ASSESSMENT:  Rachael Petersen presents with a Euthymic/ normal mood.     her affect is Normal range and intensity, which is congruent, with her mood and the content of the session. The client has made progress on their goals.     Rachael Petersen presents with a none risk of suicide, none risk of self-harm, and none risk of harm to others.    For any risk assessment that surpasses a \"low\" rating, a safety plan must be developed.    A safety plan was indicated: no  If yes, describe in detail n/a    PLAN: Between sessions, Rachael Petersen will continue to use coping strategies to manage anxiety and depression. At the next session, the therapist will use Cognitive Behavioral Therapy, Mindfulness-based Strategies, and Supportive " Psychotherapy to address anxiety, depression and relationship stressors.    Behavioral Health Treatment Plan and Discharge Planning: Rachael Petersen is aware of and agrees to continue to work on their treatment plan. They have identified and are working toward their discharge goals. yes    Visit start and stop times:    09/11/24  Start Time: 1606  Stop Time: 1658  Total Visit Time: 52 minutes

## 2024-09-12 LAB
25(OH)D3 SERPL-MCNC: 30 NG/ML (ref 30–100)
ALBUMIN SERPL-MCNC: 4.4 G/DL (ref 3.6–5.1)
ALBUMIN/GLOB SERPL: 1.5 (CALC) (ref 1–2.5)
ALP SERPL-CCNC: 113 U/L (ref 31–125)
ALT SERPL-CCNC: 25 U/L (ref 6–29)
AST SERPL-CCNC: 19 U/L (ref 10–30)
BASOPHILS # BLD AUTO: 83 CELLS/UL (ref 0–200)
BASOPHILS NFR BLD AUTO: 0.6 %
BILIRUB SERPL-MCNC: 0.3 MG/DL (ref 0.2–1.2)
BUN SERPL-MCNC: 8 MG/DL (ref 7–25)
BUN/CREAT SERPL: NORMAL (CALC) (ref 6–22)
CALCIUM SERPL-MCNC: 9.9 MG/DL (ref 8.6–10.2)
CHLORIDE SERPL-SCNC: 101 MMOL/L (ref 98–110)
CO2 SERPL-SCNC: 27 MMOL/L (ref 20–32)
CREAT SERPL-MCNC: 0.79 MG/DL (ref 0.5–0.97)
CRP SERPL-MCNC: 6.3 MG/L
EOSINOPHIL # BLD AUTO: 264 CELLS/UL (ref 15–500)
EOSINOPHIL NFR BLD AUTO: 1.9 %
ERYTHROCYTE [DISTWIDTH] IN BLOOD BY AUTOMATED COUNT: 13.4 % (ref 11–15)
FOLATE SERPL-MCNC: 3.8 NG/ML
GFR/BSA.PRED SERPLBLD CYS-BASED-ARV: 101 ML/MIN/1.73M2
GLOBULIN SER CALC-MCNC: 2.9 G/DL (CALC) (ref 1.9–3.7)
GLUCOSE SERPL-MCNC: 88 MG/DL (ref 65–99)
HCT VFR BLD AUTO: 44.3 % (ref 35–45)
HGB BLD-MCNC: 14.2 G/DL (ref 11.7–15.5)
LYMPHOCYTES # BLD AUTO: 2877 CELLS/UL (ref 850–3900)
LYMPHOCYTES NFR BLD AUTO: 20.7 %
MCH RBC QN AUTO: 27.5 PG (ref 27–33)
MCHC RBC AUTO-ENTMCNC: 32.1 G/DL (ref 32–36)
MCV RBC AUTO: 85.7 FL (ref 80–100)
MONOCYTES # BLD AUTO: 1084 CELLS/UL (ref 200–950)
MONOCYTES NFR BLD AUTO: 7.8 %
NEUTROPHILS # BLD AUTO: 9591 CELLS/UL (ref 1500–7800)
NEUTROPHILS NFR BLD AUTO: 69 %
PLATELET # BLD AUTO: 374 THOUSAND/UL (ref 140–400)
PMV BLD REES-ECKER: 10.6 FL (ref 7.5–12.5)
POTASSIUM SERPL-SCNC: 4.3 MMOL/L (ref 3.5–5.3)
PROT SERPL-MCNC: 7.3 G/DL (ref 6.1–8.1)
RBC # BLD AUTO: 5.17 MILLION/UL (ref 3.8–5.1)
SODIUM SERPL-SCNC: 136 MMOL/L (ref 135–146)
TSH SERPL-ACNC: 1.8 MIU/L
VIT B12 SERPL-MCNC: 336 PG/ML (ref 200–1100)
WBC # BLD AUTO: 13.9 THOUSAND/UL (ref 3.8–10.8)

## 2024-09-25 ENCOUNTER — SOCIAL WORK (OUTPATIENT)
Age: 34
End: 2024-09-25

## 2024-09-25 DIAGNOSIS — F41.9 ANXIETY AND DEPRESSION: Primary | ICD-10-CM

## 2024-09-25 DIAGNOSIS — F32.A ANXIETY AND DEPRESSION: Primary | ICD-10-CM

## 2024-09-25 PROCEDURE — 90834 PSYTX W PT 45 MINUTES: CPT

## 2024-09-25 NOTE — PSYCH
"Behavioral Health Psychotherapy Progress Note    Psychotherapy Provided: Individual Psychotherapy     1. Anxiety and depression            Goals addressed in session: Goal 1     DATA: Worked with patient this session how she can accept accountability of her own emotions and not try to take them on for someone else. Worked with patient to help her recognize that when she\"owns someone's emotions\", her anxiety symptoms worsen. Worked with patient to be more present with someone without thinking she needs to solve or fix anything for them. Worked with patient so she can remain present and listen, and not focus on removing others' discomfort as the goal. Worked with patient to begin exploring her own unfinished emotional issues, stressors and difficulties she has experienced that continue to take up space in her mind and affect present relationships. Allowed for a safe space for patient to begin recognizing root causes and working through the emotions.    During this session, this clinician used the following therapeutic modalities: Cognitive Behavioral Therapy, Mindfulness-based Strategies, and Supportive Psychotherapy    Substance Abuse was not addressed during this session. If the client is diagnosed with a co-occurring substance use disorder, please indicate any changes in the frequency or amount of use: n/a. Stage of change for addressing substance use diagnoses: No substance use/Not applicable    ASSESSMENT:  Rachael Petersen presents with a Euthymic/ normal mood.     her affect is Normal range and intensity, which is congruent, with her mood and the content of the session. The client has made progress on their goals.     Rachael Petersen presents with a none risk of suicide, none risk of self-harm, and none risk of harm to others.    For any risk assessment that surpasses a \"low\" rating, a safety plan must be developed.    A safety plan was indicated: no  If yes, describe in detail n/a    PLAN: Between sessions, " Rachael Petersen will continue to use coping strategies to manage anxiety and depression and work on communication with her spouse that effectively expresses her feelings when situations happen to better problem solve, rather than argue. At the next session, the therapist will use Cognitive Behavioral Therapy, Mindfulness-based Strategies, and Supportive Psychotherapy to address anxiety, depression and relationship challenges.    Behavioral Health Treatment Plan and Discharge Planning: Rachael Petersen is aware of and agrees to continue to work on their treatment plan. They have identified and are working toward their discharge goals. yes    Visit start and stop times:    09/25/24  Start Time: 1601  Stop Time: 1653  Total Visit Time: 52 minutes

## 2024-10-09 ENCOUNTER — SOCIAL WORK (OUTPATIENT)
Age: 34
End: 2024-10-09

## 2024-10-09 DIAGNOSIS — F32.A ANXIETY AND DEPRESSION: Primary | ICD-10-CM

## 2024-10-09 DIAGNOSIS — F41.9 ANXIETY AND DEPRESSION: Primary | ICD-10-CM

## 2024-10-09 PROCEDURE — 90834 PSYTX W PT 45 MINUTES: CPT

## 2024-10-09 NOTE — PSYCH
"Behavioral Health Psychotherapy Progress Note    Psychotherapy Provided: Individual Psychotherapy     1. Anxiety and depression            Goals addressed in session: Goal 1     DATA: Worked with patient to identify destructive patterns in the marital relationship, especially ones that \"disconnect them\". Continued to explore the patient's perspective of the \"root of the problem\" and what could strengthen the connection. Worked with patient to help her identify underlying issues and strategies to work on communication, compromise and commitment.       During this session, this clinician used the following therapeutic modalities: Cognitive Behavioral Therapy, Mindfulness-based Strategies, and Supportive Psychotherapy    Substance Abuse was not addressed during this session. If the client is diagnosed with a co-occurring substance use disorder, please indicate any changes in the frequency or amount of use: n/a. Stage of change for addressing substance use diagnoses: No substance use/Not applicable    ASSESSMENT:  Rachael Petersen presents with a Anxious and Depressed mood.     her affect is Flat and Tearful, which is congruent, with her mood and the content of the session. The client has made progress on their goals.     Rachael Petersen presents with a minimal risk of suicide, low risk of self-harm, and none risk of harm to others.    For any risk assessment that surpasses a \"low\" rating, a safety plan must be developed.    A safety plan was indicated: yes  If yes, describe in detail Patient will use the [facility] Mental Health Hotline (542) or call her boss, [employer], if she feels like self-harming. Thinking about her son, Wei and not wanting to be that person anymore has been helpful. She also is going to speak with her spouse about what has been upsetting her.     PLAN: Between sessions, Rachael Petersen will continue to use coping strategies to manage anxiety and depression and work on communicating with her " spouse about the issues she has been struggling with in their relationship. At the next session, the therapist will use Cognitive Behavioral Therapy, Mindfulness-based Strategies, and Supportive Psychotherapy to address anxiety, depression and marital conflict.    Behavioral Health Treatment Plan and Discharge Planning: Rachael Petersen is aware of and agrees to continue to work on their treatment plan. They have identified and are working toward their discharge goals. yes    Visit start and stop times:    10/09/24  Start Time: 1600  Stop Time: 1652  Total Visit Time: 52 minutes

## 2024-11-06 ENCOUNTER — SOCIAL WORK (OUTPATIENT)
Age: 34
End: 2024-11-06

## 2024-11-06 DIAGNOSIS — F41.9 ANXIETY AND DEPRESSION: Primary | ICD-10-CM

## 2024-11-06 DIAGNOSIS — F32.A ANXIETY AND DEPRESSION: Primary | ICD-10-CM

## 2024-11-06 PROCEDURE — 90834 PSYTX W PT 45 MINUTES: CPT

## 2024-11-06 NOTE — BH TREATMENT PLAN
Outpatient Behavioral Health Psychotherapy Treatment Plan    Rachael Petersen  1990     Date of Initial Psychotherapy Assessment: 10/25/23   Date of Current Treatment Plan: 11/06/24  Treatment Plan Target Date: 05/06/25  Treatment Plan Expiration Date: 06/06/25    Diagnosis:   1. Anxiety and depression            Area(s) of Need: Coping strategies, relationship conflicts, managing anxiety and depression    Long Term Goal 1 (in the client's own words): To understand myself better and identify symptoms, triggers and unhelpful coping strategies as well as experience a reduction in anxiety symptoms and their severity so life and transitions feel more manageable. In addition, I want to use healthier coping skills more often.      Stage of Change: Preparation and Action    Target Date for completion: n/a     Anticipated therapeutic modalities: Cognitive behavioral therapy, supportive psychotherapy, mindfulness/relaxation practices        People identified to complete this goal: Patient, therapist      Objective 1: (identify the means of measuring success in meeting the objective): Patient will be able to demonstrate at least 65% reduction in anxiety as well as improve mood by 50%, with the use of at least 2-3 learned healthier methods of coping each week and continued talk-therapy, as self-identified and reported          Objective 2: (identify the means of measuring success in meeting the objective): Patient will improve communication of feelings, wants and needs at least 50% more of the time, as self-identified and reported      Long Term Goal 2 (in the client's own words): n/a    Stage of Change: n/a    Target Date for completion: n/a     Anticipated therapeutic modalities: n/a     People identified to complete this goal: n/a      Objective 1: (identify the means of measuring success in meeting the objective): n/a      Objective 2: (identify the means of measuring success in meeting the objective): n/a     Long  Term Goal 3 (in the client's own words): n/a     I am currently under the care of a [facility] psychiatric provider: no    My [facility] psychiatric provider is: n/a    I am currently taking psychiatric medications: Yes, as prescribed    I feel that I will be ready for discharge from mental health care when I reach the following (measurable goal/objective): Goals met    For children and adults who have a legal guardian:   Has there been any change to custody orders and/or guardianship status? NA. If yes, attach updated documentation.    I have reviewed my Crisis Plan and have been offered a copy of this plan    Behavioral Health Treatment Plan [facility]: Diagnosis and Treatment Plan explained to Rachael Petersen acknowledges an understanding of their diagnosis. Rachael Petersen agrees to this treatment plan.    I have been offered a copy of this Treatment Plan. yes

## 2024-11-06 NOTE — PSYCH
"Behavioral Health Psychotherapy Progress Note    Psychotherapy Provided: Individual Psychotherapy     1. Anxiety and depression            Goals addressed in session: Goal 1     DATA: Worked with patient to identify destructive patterns in the marital relationship, especially ones that \"disconnect them\". Continued to explore the patient's perspective of the \"root of the problem\" and what could strengthen the connection. Worked with patient to help her identify underlying issues and strategies to work on communication, compromise and commitment.       During this session, this clinician used the following therapeutic modalities: Cognitive Behavioral Therapy, Mindfulness-based Strategies, and Supportive Psychotherapy    Substance Abuse was not addressed during this session. If the client is diagnosed with a co-occurring substance use disorder, please indicate any changes in the frequency or amount of use: n/a. Stage of change for addressing substance use diagnoses: No substance use/Not applicable    ASSESSMENT:  Rachael Petersen presents with a Euthymic/ normal mood.     her affect is Normal range and intensity, which is congruent, with her mood and the content of the session. The client has made progress on their goals.     Rachael Petersen presents with a none risk of suicide, none risk of self-harm, and none risk of harm to others.    For any risk assessment that surpasses a \"low\" rating, a safety plan must be developed.    A safety plan was indicated: no  If yes, describe in detail n/a    PLAN: Between sessions, Rachael Petersen will continue to use coping strategies to manage anxiety and depression and continue working on communicating needs to spouse. At the next session, the therapist will use Cognitive Behavioral Therapy, Mindfulness-based Strategies, and Supportive Psychotherapy to address anxiety, depression and relationship conflicts.    Behavioral Health Treatment Plan and Discharge Planning: Rachael CARLTON" [facility] is aware of and agrees to continue to work on their treatment plan. They have identified and are working toward their discharge goals. yes    Visit start and stop times:    11/06/24  Start Time: 1600  Stop Time: 1652  Total Visit Time: 52 minutes

## 2024-11-12 ENCOUNTER — APPOINTMENT (OUTPATIENT)
Age: 34
End: 2024-11-12
Payer: COMMERCIAL

## 2024-11-12 ENCOUNTER — OFFICE VISIT (OUTPATIENT)
Age: 34
End: 2024-11-12
Payer: COMMERCIAL

## 2024-11-12 VITALS
HEIGHT: 62 IN | TEMPERATURE: 97.3 F | DIASTOLIC BLOOD PRESSURE: 84 MMHG | SYSTOLIC BLOOD PRESSURE: 132 MMHG | HEART RATE: 94 BPM | BODY MASS INDEX: 44.83 KG/M2 | WEIGHT: 243.6 LBS | OXYGEN SATURATION: 97 % | RESPIRATION RATE: 18 BRPM

## 2024-11-12 DIAGNOSIS — S50.12XA CONTUSION OF LEFT FOREARM, INITIAL ENCOUNTER: Primary | ICD-10-CM
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
## 2024-11-12 DIAGNOSIS — M79.632 LEFT FOREARM PAIN: ICD-10-CM

## 2024-11-12 PROCEDURE — 73090 X-RAY EXAM OF FOREARM: CPT

## 2024-11-12 PROCEDURE — G0382 LEV 3 HOSP TYPE B ED VISIT: HCPCS | Performed by: PHYSICIAN ASSISTANT

## 2024-11-12 PROCEDURE — S9083 URGENT CARE CENTER GLOBAL: HCPCS | Performed by: PHYSICIAN ASSISTANT
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
## 2024-11-19 ENCOUNTER — TELEMEDICINE (OUTPATIENT)
Age: 34
End: 2024-11-19

## 2024-11-19 DIAGNOSIS — U07.1 COVID: Primary | ICD-10-CM

## 2024-11-19 PROCEDURE — 99213 OFFICE O/P EST LOW 20 MIN: CPT | Performed by: NURSE PRACTITIONER

## 2024-11-19 RX ORDER — ONDANSETRON 4 MG/1
4 TABLET, FILM COATED ORAL EVERY 8 HOURS PRN
Qty: 20 TABLET | Refills: 0 | Status: SHIPPED | OUTPATIENT
Start: 2024-11-19

## 2024-11-19 RX ORDER — NIRMATRELVIR AND RITONAVIR 300-100 MG
3 KIT ORAL 2 TIMES DAILY
Qty: 30 TABLET | Refills: 0 | Status: SHIPPED | OUTPATIENT
Start: 2024-11-19 | End: 2024-11-24

## 2024-11-19 NOTE — PROGRESS NOTES
Name: Rachael Petersen      : 1990      MRN: [id number]  Encounter Provider: CLARENCE Cummins  Encounter Date: 2024   Encounter department: [facility]  :  Assessment & Plan           History of Present Illness {?Quick Links Encounters * My Last Note * Last Note in Specialty * Snapshot * Since Last Visit * History :87260}    Virtual Brief Visit  Name: Rachael Petersen      : 1990      MRN: [id number]  Encounter Provider: Vanessa Jessica CLARENCE Saavedra  Encounter Date: 2024   Encounter department: [facility]    This Visit is being completed by telephone. The Patient is located at Home and in the following state in which I hold an active license [place]    The patient was identified by name and date of birth. Rachael Petersen was informed that this is a telemedicine visit and that the visit is being conducted through the Epic Embedded platform. She agrees to proceed..  My office door was closed. No one else was in the room.  She acknowledged consent and understanding of privacy and security of the video platform. The patient has agreed to participate and understands they can discontinue the visit at any time.    Patient is aware this is a billable service.     :This SmartSection cannot be displayed as plain text.    History of Present Illness  [unfilled]    Visit Time  Total Visit Duration: 20    Sx started yesterday. She tested positve for COVID yesterday. She had a fever and vomitting, HA, and bodyaches.                   Review of Systems  {Select to insert medical history sections (Optional):75798}     Objective {?Quick Links Trend Vitals * Enter New Vitals * Results Review * Timeline (Adult) * Labs * Imaging * Cardiology * Procedures * Lung Cancer Screening * Surgical eConsent :88497}  LMP 10/23/2024      Physical Exam  {Administrative / Billing  Section (Optional):30603}

## 2024-11-19 NOTE — PATIENT INSTRUCTIONS
"Patient Education     COVID-19 in adults - Discharge instructions   The Basics   Written by the doctors and editors at [facility]   What are discharge instructions? -- Discharge instructions are information about how to take care of yourself after getting medical care for a health problem.  What is COVID-19? -- COVID-19 stands for \"coronavirus disease 2019.\" It is caused by a virus called SARS-CoV-2.  The virus that causes COVID-19 mainly spreads from person to person. This usually happens when an infected person coughs, sneezes, or talks near other people. A person can be infected, and spread the virus to others, even without having any symptoms.  How do I care for myself at home? -- Ask the doctor or nurse what you should do when you go home. Make sure that you understand exactly what you need to do to care for yourself. Ask questions if there is anything you do not understand.  You can also:   Follow all instructions for taking your medicines, if your doctor prescribed any.   Drink plenty of fluids, such as water, juice, or broth. This helps replace fluids lost from a fever.   Take acetaminophen (sample brand name: Tylenol) to help reduce a fever. If this does not help, you can try medicines like ibuprofen (sample brand names: Advil, Motrin).   Use a cool mist humidifier. This might make it easier to breathe.   Lower the chance of passing the infection to others:   Stay home while you are feeling sick or have a fever.   At home, try to limit close contact with other people. You can also help protect others by wearing a face mask.   Wash your hands often (figure 1).   Cover your mouth and nose with the inside of your elbow when you cough or sneeze.   Do not go to work or school until your symptoms are improving and your fever has been gone for at least 24 hours without taking medicine such as acetaminophen.  If you have not already been vaccinated, consider getting a COVID-19 vaccine as soon as you have recovered. " Being vaccinated is the best way to protect yourself and others.  When should I call the doctor? -- Call for an ambulance (in the [place] and [place], call 9-1-1) if:   You are having so much trouble breathing that you cannot speak a full sentence.   You are very confused or cannot stay awake.   Your lips or skin start to turn blue.   You think that you might be having a medical emergency - Examples include severe chest pain, feeling extremely weak or like you might pass out, or losing control of your body (like being unable to speak normally or move your arm or leg).  Call your doctor if:   You develop new shortness of breath, or your breathing gets worse (but you can still talk in full sentences).   You become weak or dizzy.   You have very dark urine or do not urinate for more than 8 hours.   You have new or worsening symptoms that concern you - COVID-19 symptoms can include fever, cough, feeling very tired, shaking, chills, headache, and trouble swallowing. They can also include digestive problems like vomiting or diarrhea.  All topics are updated as new evidence becomes available and our peer review process is complete.  This topic retrieved from [employer] on: Mar 13, 2024.  Topic 767509 Version 2.0  Release: 32.2.4 - C32.71  © 2024 UpToDate, Inc. and/or its affiliates. All rights reserved.  figure 1: How to wash your hands     Wet your hands with clean water, and apply a small amount of soap. Lather and rub hands together for at least 20 seconds. Clean your wrists, palms, backs of your hands, between your fingers, tips of your fingers, thumbs, and under and around your nails. Rinse well, and dry your hands using a clean towel.  Graphic 514584 Version 7.0  Consumer Information Use and Disclaimer   Disclaimer: This generalized information is a limited summary of diagnosis, treatment, and/or medication information. It is not meant to be comprehensive and should be used as a tool to help the user understand and/or assess  potential diagnostic and treatment options. It does NOT include all information about conditions, treatments, medications, side effects, or risks that may apply to a specific patient. It is not intended to be medical advice or a substitute for the medical advice, diagnosis, or treatment of a health care provider based on the health care provider's examination and assessment of a patient's specific and unique circumstances. Patients must speak with a health care provider for complete information about their health, medical questions, and treatment options, including any risks or benefits regarding use of medications. This information does not endorse any treatments or medications as safe, effective, or approved for treating a specific patient. UpToDate, Inc. and its affiliates disclaim any warranty or liability relating to this information or the use thereof.The use of this information is governed by the Terms of Use, available at https://www.RxAdvanceuwer.com/en/know/clinical-effectiveness-terms. 2024© UpToDate, Inc. and its affiliates and/or licensors. All rights reserved.  Copyright   © 2024 UpToDate, Inc. and/or its affiliates. All rights reserved.

## 2024-11-19 NOTE — PROGRESS NOTES
COVID-19 Outpatient Progress Note  Name: Rachael Petersen      : 1990      MRN: [id number]  Encounter Provider: CLARENCE Cummins  Encounter Date: 2024   Encounter department: [facility]    Assessment & Plan  COVID  Discussed SE and use of Paxlovid. Pt agreeable to starting Paxlovid. We discussed vit C, Vit D, and Zinc as well as sx management. Pt advised to increase fluids and take tylenol/ibuprofen as needed for fever and sx. Advised of sx which warrant more emergent care. Work note provided.   Orders:    nirmatrelvir & ritonavir (Paxlovid, 300/100,) tablet therapy pack; Take 3 tablets by mouth 2 (two) times a day for 5 days Take 2 nirmatrelvir tablets + 1 ritonavir tablet together per dose    ondansetron (ZOFRAN) 4 mg tablet; Take 1 tablet (4 mg total) by mouth every 8 (eight) hours as needed for nausea or vomiting for up to 20 doses      Disposition:     Discussed vitamin D, vitamin C, and/or zinc supplementation with patient.     Patient meets criteria for Paxlovid and they have been counseled appropriately regarding risks, benefits, side effects, and alternative treatment options. After discussion, patient agrees to treatment.    Possible side effects of Paxlovid?    Possible side effects of Paxlovid are:  - Liver Problems. Notify us right away if you start to experience loss of appetite, yellowing of your skin and the whites of eyes (jaundice), dark-colored urine, pale colored stools and itchy skin, stomach area (abdominal) pain.  - Resistance to HIV Medicines. If you have untreated HIV infection, Paxlovid may lead to some HIV medicines not working as well in the future.  - Other possible side effects include: altered sense of taste, diarrhea, high blood pressure, or muscle aches.    I have spent a total time of 18 minutes on the day of the encounter for this patient including risks and benefits of treatment options,  patient and family education, impressions, documenting in the medical record and obtaining or reviewing history.          Encounter provider: CLARENCE Cummins     Provider located at:  IN PARTNERSHIP WITH [facility]  [address] [place] [address] [phone]     Recent Visits  No visits were found meeting these conditions.  Showing recent visits within past 7 days and meeting all other requirements  Today's Visits  Date Type Provider Dept   11/19/24 Telemedicine CLARENCE Cummins [facility]   Showing today's visits and meeting all other requirements  Future Appointments  No visits were found meeting these conditions.  Showing future appointments within next 150 days and meeting all other requirements    History of Present Illness      This virtual check-in was done via [employer] Embedded and patient was informed that this is a secure, HIPAA-compliant platform. She agrees to proceed.    Patient agrees to participate in a virtual check in via telephone or video visit instead of presenting to the office to address urgent/immediate medical needs. Patient is aware this is a billable service. She acknowledged consent and understanding of privacy and security of the video platform. The patient has agreed to participate and understands they can discontinue the visit at any time.    After connecting through [employer], the patient was identified by name and date of birth. Rachael Petersen was informed that this was a telemedicine visit and that the exam was being conducted confidentially over secure lines. My office door was closed. No one else was in the room. Rachael Petersen acknowledged consent and understanding of privacy and security of the telemedicine visit. I informed the patient that I have reviewed her record in Epic and presented the opportunity for her to ask any questions regarding the visit today. The patient agreed to  participate.     Verification of patient location:  Patient is located in the following state in which I hold an active license: [place]    Subjective:   Rachael Petersen is a 34 y.o. female who is concerned about COVID-19. Patient's symptoms include fever, chills, fatigue, sore throat, cough, nausea, vomiting and headache. Patient denies malaise, congestion, rhinorrhea, anosmia, loss of taste, shortness of breath, chest tightness, abdominal pain, diarrhea and myalgias.     - Date of symptom onset: 11/18/2024      COVID-19 vaccination status: Fully vaccinated (primary series)    Pt here today to discuss sx of COVID. Pt reports sx started yesterday and she tested positive for COVID. She has taken Tylenol for fevers.     Lab Results   Component Value Date    SARSCOV2 Negative 04/02/2021    SARSCOVAG Negative 09/11/2024       Review of Systems   Constitutional:  Positive for chills, fatigue and fever. Negative for activity change, appetite change and diaphoresis.   HENT:  Positive for sore throat. Negative for congestion, ear discharge, ear pain, rhinorrhea, sinus pressure, sinus pain and trouble swallowing.    Respiratory:  Positive for cough. Negative for apnea, choking, chest tightness, shortness of breath, wheezing and stridor.    Cardiovascular: Negative.  Negative for chest pain.   Gastrointestinal:  Positive for nausea and vomiting. Negative for abdominal distention, abdominal pain, anal bleeding, constipation, diarrhea and rectal pain.   Musculoskeletal: Negative.  Negative for myalgias.   Neurological:  Positive for headaches. Negative for dizziness, tremors, speech difficulty and weakness.     Objective   LMP 10/23/2024     Physical Exam  Constitutional:       General: She is not in acute distress.     Appearance: Normal appearance. She is ill-appearing. She is not toxic-appearing or diaphoretic.   HENT:      Head: Normocephalic and atraumatic.   Eyes:      General:         Right eye: No discharge.         Left  eye: No discharge.   Pulmonary:      Effort: Pulmonary effort is normal. No respiratory distress.   Skin:     Coloration: Skin is not jaundiced or pale.      Findings: No bruising, erythema, lesion or rash.   Neurological:      Mental Status: She is alert and oriented to person, place, and time.   Psychiatric:         Mood and Affect: Mood normal.         Behavior: Behavior normal.         Thought Content: Thought content normal.         Judgment: Judgment normal.

## 2024-11-19 NOTE — LETTER
November 19, 2024     Patient: Rachael Petersen  YOB: 1990  Date of Visit: 11/19/2024      To Whom it May Concern:    Rachael Petersen is under my professional care. Rachael was seen in my office on 11/19/2024. Rachael may return to work on 11/25/2024 .    If you have any questions or concerns, please don't hesitate to call.         Sincerely,          CLARENCE Cummins        CC: No Recipients

## 2024-11-20 ENCOUNTER — TELEMEDICINE (OUTPATIENT)
Age: 34
End: 2024-11-20

## 2024-11-20 DIAGNOSIS — F41.9 ANXIETY AND DEPRESSION: Primary | ICD-10-CM

## 2024-11-20 DIAGNOSIS — F32.A ANXIETY AND DEPRESSION: Primary | ICD-10-CM

## 2024-11-20 PROCEDURE — 90834 PSYTX W PT 45 MINUTES: CPT

## 2024-11-20 NOTE — PSYCH
"Behavioral Health Psychotherapy Progress Note    Psychotherapy Provided: Individual Psychotherapy     1. Anxiety and depression            Goals addressed in session: Goal 1     DATA: Worked with patient to identify destructive patterns in the marital relationship, especially ones that \"disconnect them\". Continued to explore the patient's perspective of the \"root of the problem\" and what could strengthen the connection. Worked with patient to help her identify underlying issues and strategies to work on communication, compromise and commitment. Worked with patient how she can truly listen to her spouse and work together to solve a problem, rather than treat her spouse as the problem, in order to resolve the conflict in a cooperative way. Worked with patient so she can begin to redirect the conflict without emotional detouring. Worked with patient how she can unite with her spouse when facing difficult situations rather than combat each other to overcome difficult issues. Helped patient discover win-win scenarios and help her identify new behavior patterns that may help them avoid engaging in negative actions and reactions in the future. Worked with patient how she can keep kennedy and gentleness in mind as she and her spouse continue navigating their relationship and making healthy changes and dealing with conflict.     During this session, this clinician used the following therapeutic modalities: Cognitive Behavioral Therapy, Mindfulness-based Strategies, and Supportive Psychotherapy    Substance Abuse was not addressed during this session. If the client is diagnosed with a co-occurring substance use disorder, please indicate any changes in the frequency or amount of use: n/a. Stage of change for addressing substance use diagnoses: No substance use/Not applicable    ASSESSMENT:  Rachael Petersen presents with a Euthymic/ normal mood.     her affect is Normal range and intensity, which is congruent, with her mood and " "the content of the session. The client has made progress on their goals.     Rachael Petersen presents with a none risk of suicide, none risk of self-harm, and none risk of harm to others.    For any risk assessment that surpasses a \"low\" rating, a safety plan must be developed.    A safety plan was indicated: no  If yes, describe in detail n/a    PLAN: Between sessions, Rachael Petersen will continue to use coping strategies discussed to manage anxiety and depression. At the next session, the therapist will use Cognitive Behavioral Therapy, Mindfulness-based Strategies, and Supportive Psychotherapy to address anxiety, depression and relationship issues.    Behavioral Health Treatment Plan and Discharge Planning: Rachael Petersen is aware of and agrees to continue to work on their treatment plan. They have identified and are working toward their discharge goals. yes    Visit start and stop times:    11/20/24  Start Time: 1600  Stop Time: 1646  Total Visit Time: 46 minutes      Virtual Regular Visit    Verification of patient location:    Patient is located at Home in the following state in which I hold an active license [place]      Assessment/Plan:    Problem List Items Addressed This Visit       Anxiety and depression - Primary       Goals addressed in session: Goal 1          Reason for visit is No chief complaint on file.       Encounter provider Sue Cardenas LCSW      Recent Visits  Date Type Provider Dept   11/19/24 Telemedicine CLARENCE Cummins [facility] [place]   Showing recent visits within past 7 days and meeting all other requirements  Today's Visits  Date Type Provider Dept   11/20/24 Telemedicine Sue Cardenas LCSW [facility] [place]   Showing today's visits and meeting all other requirements  Future Appointments  No visits were found meeting these conditions.  Showing future appointments within next 150 days and meeting all other requirements   "     The patient was identified by name and date of birth. Rachael Petersen was informed that this is a telemedicine visit and that the visit is being conducted throughthe Epic Embedded platform. She agrees to proceed..  My office door was closed. No one else was in the room.  She acknowledged consent and understanding of privacy and security of the video platform. The patient has agreed to participate and understands they can discontinue the visit at any time.    HPI     Past Medical History:   Diagnosis Date    Allergic within the last 3 years    Ron Pedro, seasonal    Anxiety 2011    Arthritis     Bronchitis 09/21/2023    Depression     Fall from slipping 08/13/2023    Head injury 08/13/2023    Migraines     Neck strain 08/13/2023    Post-concussion syndrome 08/15/2023       Past Surgical History:   Procedure Laterality Date    SHOULDER ARTHROSCOPY Left     SHOULDER SURGERY      WISDOM TOOTH EXTRACTION      WISDOM TOOTH EXTRACTION         Current Outpatient Medications   Medication Sig Dispense Refill    escitalopram (LEXAPRO) 20 mg tablet TAKE 1 TABLET DAILY 90 tablet 3    hydrOXYzine HCL (ATARAX) 50 mg tablet Take 1 tablet (50 mg total) by mouth 3 (three) times a day as needed for itching 90 tablet 0    meloxicam (MOBIC) 15 mg tablet Take 15 mg by mouth daily      nirmatrelvir & ritonavir (Paxlovid, 300/100,) tablet therapy pack Take 3 tablets by mouth 2 (two) times a day for 5 days Take 2 nirmatrelvir tablets + 1 ritonavir tablet together per dose 30 tablet 0    ondansetron (ZOFRAN) 4 mg tablet Take 1 tablet (4 mg total) by mouth every 8 (eight) hours as needed for nausea or vomiting for up to 20 doses 20 tablet 0    oxybutynin (DITROPAN) 5 mg tablet Take 1 tablet (5 mg total) by mouth in the morning 90 tablet 3    traZODone (DESYREL) 100 mg tablet TAKE 1 TABLET DAILY AT BEDTIME 90 tablet 3     No current facility-administered medications for this visit.        Allergies   Allergen Reactions     Bupropion Hives    Sulfamethoxazole-Trimethoprim Hives

## 2024-12-04 ENCOUNTER — SOCIAL WORK (OUTPATIENT)
Age: 34
End: 2024-12-04

## 2024-12-04 DIAGNOSIS — F41.9 ANXIETY AND DEPRESSION: Primary | ICD-10-CM

## 2024-12-04 DIAGNOSIS — F32.A ANXIETY AND DEPRESSION: Primary | ICD-10-CM

## 2024-12-04 PROCEDURE — 90834 PSYTX W PT 45 MINUTES: CPT

## 2024-12-04 NOTE — PSYCH
"Behavioral Health Psychotherapy Progress Note    Psychotherapy Provided: Individual Psychotherapy     1. Anxiety and depression            Goals addressed in session: Goal 1     DATA: Worked with patient to continue reducing the frequency, intensity and duration of her anxiety. Worked with patient to continue resolving the core conflicts that are the source of her anxiety. Continued to work with patient to enhance her ability to effectively cope with a variety of life's anxieties.     During this session, this clinician used the following therapeutic modalities: Cognitive Behavioral Therapy, Mindfulness-based Strategies, and Supportive Psychotherapy    Substance Abuse was not addressed during this session. If the client is diagnosed with a co-occurring substance use disorder, please indicate any changes in the frequency or amount of use: n/a. Stage of change for addressing substance use diagnoses: No substance use/Not applicable    ASSESSMENT:  Rachael Petersen presents with a Euthymic/ normal mood.     her affect is Normal range and intensity, which is congruent, with her mood and the content of the session. The client has made progress on their goals.     Rachael Petersen presents with a none risk of suicide, none risk of self-harm, and none risk of harm to others.    For any risk assessment that surpasses a \"low\" rating, a safety plan must be developed.    A safety plan was indicated: no  If yes, describe in detail n/a    PLAN: Between sessions, Rachael Petersen will continue to use coping strategies discussed to manage anxiety and depression. Offered a few more coping strategies to try in between appointments. At the next session, the therapist will use Cognitive Behavioral Therapy, Mindfulness-based Strategies, and Supportive Psychotherapy to address anxiety, depression and stress at home.    Behavioral Health Treatment Plan and Discharge Planning: Rachael Petersen is aware of and agrees to continue to work " on their treatment plan. They have identified and are working toward their discharge goals. yes    Visit start and stop times:    12/04/24  Start Time: 1603  Stop Time: 1655  Total Visit Time: 52 minutes

## 2024-12-27 ENCOUNTER — PATIENT OUTREACH (OUTPATIENT)
Dept: BARIATRICS | Facility: CLINIC | Age: 34
End: 2024-12-27

## 2024-12-27 NOTE — PROGRESS NOTES
Weight History     Highest Weight: 240# (current weight)  Lowest Weight: 130#    Past Attempts at Weight Loss: Supervised Physician Weight Loss Program, WW Program, Nutrition Counseling (to reduce calorie intake but this wasn't helpful)    Food Recall  Breakfast: skips meal. Drinks 12oz [employer] [place] iced coffee.   Snack: skip  Lunch: leftovers from the night before (see below)  Snack: skip  Dinner: pork chop + rice + vegetables OR steak + noodles OR chicken nitin (her wife will cook with olive oil or butter)   Snack: bowl of ice cream     Beverages: 3-4 cups water, 1-2 cups [place] iced tea, 2 cups iced coffee  Volume of Beverage Intake: see above    Frequency of Dining out: once per week    Would the patient benefit from visit with BH specialist?: n/a    Physical Activity Intake  Activity: Pilates (15 minutes each night)  Frequency of Exercise: daily  Physical Limitations to Exercise: n/a    Review of Programs  Overview of medical weight management programs provided?: Yes   Is patient interested in discussing medication?: Yes   Is patient interested in discussing bariatric surgery?: No  Does patient know which pathway they are interested in?: Yes

## 2024-12-31 ENCOUNTER — OFFICE VISIT (OUTPATIENT)
Dept: BARIATRICS | Facility: CLINIC | Age: 34
End: 2024-12-31
Payer: COMMERCIAL

## 2024-12-31 VITALS
HEIGHT: 62 IN | RESPIRATION RATE: 16 BRPM | SYSTOLIC BLOOD PRESSURE: 120 MMHG | DIASTOLIC BLOOD PRESSURE: 84 MMHG | TEMPERATURE: 97.5 F | WEIGHT: 236.8 LBS | HEART RATE: 94 BPM | BODY MASS INDEX: 43.58 KG/M2

## 2024-12-31 DIAGNOSIS — G47.33 OSA (OBSTRUCTIVE SLEEP APNEA): Primary | ICD-10-CM

## 2024-12-31 DIAGNOSIS — E66.01 MORBID OBESITY WITH BMI OF 40.0-44.9, ADULT (HCC): ICD-10-CM

## 2024-12-31 PROCEDURE — 99204 OFFICE O/P NEW MOD 45 MIN: CPT | Performed by: PHYSICIAN ASSISTANT

## 2024-12-31 RX ORDER — NICOTINE POLACRILEX 4 MG/1
GUM, CHEWING ORAL
COMMUNITY

## 2024-12-31 RX ORDER — TIRZEPATIDE 2.5 MG/.5ML
2.5 INJECTION, SOLUTION SUBCUTANEOUS WEEKLY
Qty: 2 ML | Refills: 0 | Status: SHIPPED | OUTPATIENT
Start: 2024-12-31 | End: 2025-01-28

## 2024-12-31 NOTE — ASSESSMENT & PLAN NOTE
-encouraged continued use of CPAP machine  -may improve with 20-30% weight loss  - to start on zepbound

## 2024-12-31 NOTE — PROGRESS NOTES
Assessment/Plan:    Morbid obesity with BMI of 40.0-44.9, adult ([facility])  -Discussed options of HealthyCORE-Intensive Lifestyle Intervention Program, Very Low Calorie Diet-VLCD, Conservative Program, Greg-En-Y Gastric Bypass, and Vertical Sleeve Gastrectomy and the role of weight loss medications.Explained the importance of making lifestyle changes if utilizing medication to aid in weight loss  -Initial weight loss goal of 5-10% weight loss for improved health  -Not interested in bariatric surgery  -Screening labs and records reviewed from prior    -Patient is interested in pursuing Conservative Program and RD visit  -Calorie goals, sample menu (2883-9338 calories), portion size guidelines, and food logging reviewed with the patient.       Goals:  -Food log (ie.) [email or web address],[employer]-  - To drink at least 64oz of water daily.No sugary beverages.try to reduce iced tea. Discussed doing protein coffee in the AM\  -continue pilates daily    To start on zepbound. To start on initial dose of medication and then to titrate as tolerated.  They have tried more than 6 months of lifestyle modifications including diet and activity changes and has had insignificant weight loss of less than 1 lb a week. Patient denies personal and family history of MCT and MEN2 tumors. Patient denies personal history of pancreatitis. Side effects discussed but not limited to diarrhea, bloating, constipation, GI upset, heartburn, increased heart rate, headache, low blood sugar, fatigue and dizziness. Titration and medication administration discussed.  Medication agreement signed 12/31/24    Initial Weight:236.8  Goal Weight:150lb       SHAUNA (obstructive sleep apnea)  -encouraged continued use of CPAP machine  -may improve with 20-30% weight loss  - to start on zepbound      Return in about 3 months (around 3/31/2025) for rd visit.    Diagnoses and all orders for this visit:    SHAUNA (obstructive sleep apnea)  -     tirzepatide  (Zepbound) 2.5 mg/0.5 mL auto-injector; Inject 0.5 mL (2.5 mg total) under the skin once a week for 28 days    Morbid obesity with BMI of 40.0-44.9, adult ([facility])  -     Ambulatory Referral to Weight Management  -     tirzepatide (Zepbound) 2.5 mg/0.5 mL auto-injector; Inject 0.5 mL (2.5 mg total) under the skin once a week for 28 days    Other orders  -     Omeprazole 20 MG TBEC; Take by mouth          Subjective:   Chief Complaint   Patient presents with   • Consult     MWM Consult; GW 150lbs; Waist 41in; Patient has sleep apnea        Patient ID: Rachael Petersen  is a 34 y.o. female with excess weight/obesity here to pursue weight management.    Past Medical History:   Diagnosis Date   • Allergic within the last 3 years    Batrium, Wellibutrin, seasonal   • Anxiety 2011   • Arthritis    • Bronchitis 09/21/2023   • Depression    • Fall from slipping 08/13/2023   • Head injury 08/13/2023   • Migraines    • Neck strain 08/13/2023   • Obstructive sleep apnea on CPAP 12/08/2024   • Post-concussion syndrome 08/15/2023       HPI: Here for MWM consult    Has had  weight concern for last 5 years. She did see RD through her job but diet plan was too restrictive. She is interested in med options.  She is trying to stay more active now.   Obesity/Excess Weight:  Severity:  BMI 40-45  Onset:  last 5 years    Modifiers: Diet and Exercise and Physician Supervised Weight Loss Program. Worked with RD prior and they wanted to her do 1000 calorie diet .  Pill   Contributing factors: Medications and lifestyle changes with covid    Hydration:48-64 oz water, iced cofe, 1-2 glasses of ice tea  Alcohol: rare  Exercise:doing pil"[employer] bar about 15 min a day  Occupation:more desk work  Sleep:6 hours of sleep  Dining out/takeout:1 x week outside work, 2 x week takeout hoagie    Diet Recall  L:leftovers or turkey hoagie ( about 2 x  week)  D: protein, starch , vegetable    The following portions of the patient's history were reviewed and  updated as appropriate: She  has a past medical history of Allergic (within the last 3 years), Anxiety (2011), Arthritis, Bronchitis (09/21/2023), Depression, Fall from slipping (08/13/2023), Head injury (08/13/2023), Migraines, Neck strain (08/13/2023), Obstructive sleep apnea on CPAP (12/08/2024), and Post-concussion syndrome (08/15/2023).  She   Patient Active Problem List    Diagnosis Date Noted   • SHAUNA (obstructive sleep apnea) 12/31/2024   • Fatigue 09/11/2024   • Annual physical exam 08/05/2024   • Upper respiratory tract infection 08/05/2024   • Monoallelic mutation of CHEK2 gene in female patient 02/05/2024   • Thyroid nodule 09/21/2023   • Nausea 08/15/2023   • Chronic pain of both knees 04/20/2023   • Increased risk of breast cancer 02/03/2023   • Family history of gene mutation 02/03/2023   • Prediabetes 01/19/2023   • Anxiety and depression 07/15/2022   • Overactive bladder 07/15/2022   • Primary insomnia 07/15/2022   • Family history of breast cancer 07/15/2022   • Elevated LDL cholesterol level 07/15/2022   • Morbid obesity with BMI of 40.0-44.9, adult (HCC) 07/15/2022     She  has a past surgical history that includes Shoulder surgery; [place] tooth extraction; Shoulder arthroscopy (Left); and [place] tooth extraction.  Her family history includes Anxiety disorder in her paternal grandmother; Arthritis in her mother and paternal grandmother; Asthma in her mother; BRCA2 Positive in her cousin, cousin, paternal aunt, paternal aunt, and paternal aunt; Breast cancer in her cousin, cousin, cousin, cousin, cousin, paternal aunt, paternal aunt, paternal aunt, and paternal aunt; Cancer in her father, paternal aunt, and paternal grandmother; Depression in her paternal grandmother; Diabetes in her maternal grandfather, paternal grandfather, and paternal grandmother; Heart disease in her father; Heart failure in her mother; Hypertension in her maternal grandfather and paternal grandmother; Mental illness in her  father and paternal grandmother; No Known Problems in her maternal aunt and maternal grandmother; Pancreatic cancer (age of onset: 60) in her paternal grandmother; Stroke in her paternal grandmother; Suicide Attempts in her paternal grandmother; Thyroid cancer (age of onset: 40) in her father and paternal aunt; Thyroid disease in her father, paternal aunt, and paternal aunt.  She  reports that she has never smoked. She has never used smokeless tobacco. She reports that she does not currently use alcohol. She reports that she does not use drugs.  Current Outpatient Medications   Medication Sig Dispense Refill   • escitalopram (LEXAPRO) 20 mg tablet TAKE 1 TABLET DAILY 90 tablet 3   • hydrOXYzine HCL (ATARAX) 50 mg tablet Take 1 tablet (50 mg total) by mouth 3 (three) times a day as needed for itching 90 tablet 0   • meloxicam (MOBIC) 15 mg tablet Take 15 mg by mouth daily     • oxybutynin (DITROPAN) 5 mg tablet Take 1 tablet (5 mg total) by mouth in the morning 90 tablet 3   • tirzepatide (Zepbound) 2.5 mg/0.5 mL auto-injector Inject 0.5 mL (2.5 mg total) under the skin once a week for 28 days 2 mL 0   • traZODone (DESYREL) 100 mg tablet TAKE 1 TABLET DAILY AT BEDTIME 90 tablet 3   • Omeprazole 20 MG TBEC Take by mouth     • ondansetron (ZOFRAN) 4 mg tablet Take 1 tablet (4 mg total) by mouth every 8 (eight) hours as needed for nausea or vomiting for up to 20 doses (Patient not taking: Reported on 12/31/2024) 20 tablet 0     No current facility-administered medications for this visit.     Current Outpatient Medications on File Prior to Visit   Medication Sig   • escitalopram (LEXAPRO) 20 mg tablet TAKE 1 TABLET DAILY   • hydrOXYzine HCL (ATARAX) 50 mg tablet Take 1 tablet (50 mg total) by mouth 3 (three) times a day as needed for itching   • meloxicam (MOBIC) 15 mg tablet Take 15 mg by mouth daily   • oxybutynin (DITROPAN) 5 mg tablet Take 1 tablet (5 mg total) by mouth in the morning   • traZODone (DESYREL) 100 mg  "tablet TAKE 1 TABLET DAILY AT BEDTIME   • Omeprazole 20 MG TBEC Take by mouth   • ondansetron (ZOFRAN) 4 mg tablet Take 1 tablet (4 mg total) by mouth every 8 (eight) hours as needed for nausea or vomiting for up to 20 doses (Patient not taking: Reported on 12/31/2024)     No current facility-administered medications on file prior to visit.     She is allergic to bupropion and sulfamethoxazole-trimethoprim..    Review of Systems   Constitutional:  Negative for fever.   Respiratory:  Negative for shortness of breath.    Cardiovascular:  Negative for chest pain and palpitations.   Gastrointestinal:  Negative for abdominal pain, constipation, diarrhea and vomiting.   Genitourinary:  Negative for difficulty urinating.   Skin:  Negative for rash.   Neurological:  Negative for headaches.   Psychiatric/Behavioral:  Positive for sleep disturbance. Negative for dysphoric mood. The patient is nervous/anxious.        Objective:    /84 (BP Location: Left arm, Patient Position: Sitting)   Pulse 94   Temp 97.5 °F (36.4 °C) (Tympanic)   Resp 16   Ht 5' 2\" (1.575 m)   Wt 107 kg (236 lb 12.8 oz)   BMI 43.31 kg/m²     Physical Exam  Vitals and nursing note reviewed.   Constitutional:       General: She is not in acute distress.     Appearance: She is well-developed. She is obese.   HENT:      Head: Normocephalic and atraumatic.   Eyes:      Conjunctiva/sclera: Conjunctivae normal.   Neck:      Thyroid: No thyromegaly.   Pulmonary:      Effort: Pulmonary effort is normal. No respiratory distress.   Skin:     Findings: No rash (visible).   Neurological:      Mental Status: She is alert and oriented to person, place, and time.   Psychiatric:         Mood and Affect: Mood normal.         Behavior: Behavior normal.       "

## 2024-12-31 NOTE — ASSESSMENT & PLAN NOTE
-Discussed options of HealthyCORE-Intensive Lifestyle Intervention Program, Very Low Calorie Diet-VLCD, Conservative Program, Greg-En-Y Gastric Bypass, and Vertical Sleeve Gastrectomy and the role of weight loss medications.Explained the importance of making lifestyle changes if utilizing medication to aid in weight loss  -Initial weight loss goal of 5-10% weight loss for improved health  -Not interested in bariatric surgery  -Screening labs and records reviewed from prior    -Patient is interested in pursuing Conservative Program and RD visit  -Calorie goals, sample menu (8305-0551 calories), portion size guidelines, and food logging reviewed with the patient.       Goals:  -Food log (ie.) www.[employer].com,[employer]ree,[employer]  - To drink at least 64oz of water daily.No sugary beverages.try to reduce iced tea. Discussed doing protein coffee in the AM\  -continue pilates daily    To start on zepbound. To start on initial dose of medication and then to titrate as tolerated.  They have tried more than 6 months of lifestyle modifications including diet and activity changes and has had insignificant weight loss of less than 1 lb a week. Patient denies personal and family history of MCT and MEN2 tumors. Patient denies personal history of pancreatitis. Side effects discussed but not limited to diarrhea, bloating, constipation, GI upset, heartburn, increased heart rate, headache, low blood sugar, fatigue and dizziness. Titration and medication administration discussed.  Medication agreement signed 12/31/24    Initial Weight:236.8  Goal Weight:150lb

## 2025-01-03 ENCOUNTER — RA CDI HCC (OUTPATIENT)
Dept: OTHER | Facility: HOSPITAL | Age: 35
End: 2025-01-03

## 2025-01-06 ENCOUNTER — TELEPHONE (OUTPATIENT)
Dept: BARIATRICS | Facility: CLINIC | Age: 35
End: 2025-01-06

## 2025-01-06 NOTE — TELEPHONE ENCOUNTER
PA for Zepbound 2.5mg APPROVED     Date(s) approved 1/6/2025 - 1/6/2026    Case #    Patient advised by          []iAdvizehart Message  [x]Phone call   []LMOM  []L/M to call office as no active Communication consent on file  []Unable to leave detailed message as VM not approved on Communication consent       Pharmacy advised by    [x]Fax  []Phone call    Approval letter scanned into Media Yes

## 2025-01-06 NOTE — TELEPHONE ENCOUNTER
PA for Zepbound 2.5mg SUBMITTED to [employer]     via    [x]CMM-KEY: [id number]  []Surescripts-Case ID #   []Availity-Auth ID # NDC #   []Faxed to plan   []Other website   []Phone call Case ID #     []PA sent as URGENT    All office notes, labs and other pertaining documents and studies sent. Clinical questions answered. Awaiting determination from insurance company.     Turnaround time for your insurance to make a decision on your Prior Authorization can take 7-21 business days.

## 2025-01-09 ENCOUNTER — CLINICAL SUPPORT (OUTPATIENT)
Dept: BARIATRICS | Facility: CLINIC | Age: 35
End: 2025-01-09

## 2025-01-09 DIAGNOSIS — R63.5 ABNORMAL WEIGHT GAIN: Primary | ICD-10-CM

## 2025-01-09 PROCEDURE — WMDI30: Performed by: DIETITIAN, REGISTERED

## 2025-01-09 PROCEDURE — RECHECK: Performed by: DIETITIAN, REGISTERED

## 2025-01-09 NOTE — PROGRESS NOTES
Weight Management Medical Nutrition Assessment  Patient presents for menu planning session. Seen by [facility] provider on 12/31/24. Plans to start Zepbound 2.5mg.  Has had  weight concern for last 5 years. She did see RD through her job but diet plan was too restrictive/only focused on calories not the whole lifestyle. Feels her difficulties with diet stem from challenges at home (autistic son who is picky with his food choice).       Patient seen by Medical Provider in past 6 months:  yes 12/31/24 Charlie Cheney [facility] provider   Requested to schedule appointment with Medical Provider: Yes 3/25/25 Dr. Tapia    Anthropometric Measurements  Start Weight (#) & Date: 236.8 (12/31/24 [facility] provider)  Current Weight (#): 237.5  TBW % Change from start weight:-  Ideal Body Weight (#):110  Goal Weight (#):150  Highest:245 (Nov 2024)  Lowest:    Weight Loss History  Previous weight loss attempts: Diet and Exercise and Physician Supervised Weight Loss Program. Worked with RD prior and they wanted to her do 1000 calorie diet .  Pil    Food and Nutrition Related History  Wake up: 6a on work days, 7:30-8a   Bed Time:10:30a  Occupation:more desk work,  in school district  Sleep:6 hours of sleep, recent dx of SHAUNA, still getting used to the Cpap, also deals with insomnia (on trazadone to help her fall asleep)    Food Recall  10a muffin from giant, 12oz iced coffee (international delight coffee cartons)  12p L:leftovers or turkey hoagie ( about 2 x  week, 6 inch, cheese light amaro, sweet peppers)  Home from work 3:45p   5-5:30p D: protein, starch , vegetable (susage with mashed potatoes and mixed vegetables)  -wife portions out the plates and give large portions, does not always finished  S: ice cream (2 scoops) vanilla or cookies and cream     Beverages: 48-64 oz water, iced cofe, 1-2 glasses of ice tea  Alcohol: rare  Volume of beverage intake: see above    Weekends: Worse, hoem with her family more which can lead to  more intakes  Cravings: ice cream   Trouble area of day:after dinner once her son is in bed    Frequency of Eating out: 1 x week outside work, 2 x week takeout hoagie  Food restrictions:none  Cooking: wife  Food Shopping: wife  -adoptive son is autistic    Physical Activity Intake  Activity:doing pil[employer] bar about 15 min a day  Frequency:daily  Physical limitations/barriers to exercise: some knee pain    Estimated Needs  Energy  SECA: BMR:n/a      X 1.3 -1000 =  [address] Energy Needs: BMR : 1722   1-2# loss weekly sedentary:  1586-8683             1-2# loss weekly lightly active:0957-5372  Maintenance calories for sedentary activity level: 2067  Protein:60-75g      (1.2-1.5g/kg IBW)  Fluid: 58oz     (35mL/kg IBW)    Nutrition Diagnosis  Yes;    Overweight/obesity  related to Excess energy intake as evidenced by  BMI more than normative standard for age and sex (obesity-grade III 40+)       Nutrition Intervention    Nutrition Prescription  Calories:7534-2082  Protein:75-85g  Fluid:64oz    Meal Plan (Boogie/Pro/Carb)  Breakfast:200-300/15-20  Snack:100-150/5-10  Lunch:400-500/25-40  Snack:100-150/5-10  Dinner:400-500/25-40  Snack:100-150/5-10    Nutrition Education:    Calorie controlled menu  Lean protein food choices  Healthy snack options  Food journaling tips      Nutrition Counseling:  Strategies: meal planning, portion sizes, healthy snack choices, hydration, fiber intake, protein intake, exercise, food journal      Monitoring and Evaluation:  Evaluation criteria:  Energy Intake  Meet protein needs  Maintain adequate hydration  Monitor weekly weight  Meal planning/preparation  Food journal   Decreased portions at mealtimes and snacks  Physical activity     Barriers to learning:none  Readiness to change: Preparation:  (Getting ready to change)   Comprehension: very good  Expected Compliance: good

## 2025-01-13 DIAGNOSIS — N32.81 OVERACTIVE BLADDER: ICD-10-CM

## 2025-01-13 RX ORDER — OXYBUTYNIN CHLORIDE 5 MG/1
5 TABLET ORAL EVERY MORNING
Qty: 90 TABLET | Refills: 3 | Status: SHIPPED | OUTPATIENT
Start: 2025-01-13

## 2025-01-15 ENCOUNTER — SOCIAL WORK (OUTPATIENT)
Age: 35
End: 2025-01-15

## 2025-01-15 DIAGNOSIS — F32.A ANXIETY AND DEPRESSION: Primary | ICD-10-CM

## 2025-01-15 DIAGNOSIS — F41.9 ANXIETY AND DEPRESSION: Primary | ICD-10-CM

## 2025-01-15 PROCEDURE — 90834 PSYTX W PT 45 MINUTES: CPT

## 2025-01-15 NOTE — PSYCH
"Behavioral Health Psychotherapy Progress Note    Psychotherapy Provided: Individual Psychotherapy     1. Anxiety and depression            Goals addressed in session: Goal 1     DATA: Worked with patient to explore the gap between what she actually experiences in her relationship with her spouse and the idealized picture she holds in her mind of what she thinks is going on in the relationship and what is going on in the mind of her spouse. Explored these expectations, identifying if they are healthy and serve a beneficial purpose for the patient. Explored the source of the expectations. Helped patient work towards aligning them with the actual dynamics of the relationship. Used self-reflection to examine the patient's own expectations and identify where they might be unrealistic or untrue or hinder the relationship. Worked with patient to identify unhealthy patterns. Helped patient refocus on healthy expectations and realities.     During this session, this clinician used the following therapeutic modalities: Cognitive Behavioral Therapy, Mindfulness-based Strategies, and Supportive Psychotherapy    Substance Abuse was not addressed during this session. If the client is diagnosed with a co-occurring substance use disorder, please indicate any changes in the frequency or amount of use: n/a. Stage of change for addressing substance use diagnoses: No substance use/Not applicable    ASSESSMENT:  Rachael Petersen presents with a Euthymic/ normal mood.     her affect is Normal range and intensity, which is congruent, with her mood and the content of the session. The client has made progress on their goals.     Rachael Petersen presents with a none risk of suicide, none risk of self-harm, and none risk of harm to others.    For any risk assessment that surpasses a \"low\" rating, a safety plan must be developed.    A safety plan was indicated: no  If yes, describe in detail n/a    PLAN: Between sessions, Rachael Petersen " "will continue to use coping strategies discussed to manage anxiety and depression. Patient will continue to identify her non-negotiables in a relationship and what her \"breaking point\" is to decide if she wants to work on her happiness and consider leaving her spouse. At the next session, the therapist will use Cognitive Behavioral Therapy, Mindfulness-based Strategies, and Supportive Psychotherapy to address anxiety, depression and relationship conflicts.    Behavioral Health Treatment Plan and Discharge Planning: Rachael Petersen is aware of and agrees to continue to work on their treatment plan. They have identified and are working toward their discharge goals. yes    Depression Follow-up Plan Completed: Not applicable    Visit start and stop times:    01/15/25  Start Time: 1603  Stop Time: 1655  Total Visit Time: 52 minutes  "

## 2025-01-16 ENCOUNTER — TELEPHONE (OUTPATIENT)
Dept: SURGICAL ONCOLOGY | Facility: CLINIC | Age: 35
End: 2025-01-16

## 2025-01-16 NOTE — TELEPHONE ENCOUNTER
Spoke with pt regarding need to cancel appt 2/7/25 @ 9 AM because of change in provider schedule.  New appt is 2/11/25 3:30 PM with Leta Spann

## 2025-01-29 ENCOUNTER — SOCIAL WORK (OUTPATIENT)
Age: 35
End: 2025-01-29

## 2025-01-29 DIAGNOSIS — F32.A ANXIETY AND DEPRESSION: Primary | ICD-10-CM

## 2025-01-29 DIAGNOSIS — F41.9 ANXIETY AND DEPRESSION: Primary | ICD-10-CM

## 2025-01-29 PROCEDURE — 90834 PSYTX W PT 45 MINUTES: CPT

## 2025-01-29 NOTE — PSYCH
"Behavioral Health Psychotherapy Progress Note    Psychotherapy Provided: Individual Psychotherapy     1. Anxiety and depression            Goals addressed in session: Goal 1     DATA: Worked with patient this session how she can accept accountability of her own emotions and not try to take them on for someone else. Worked with patient to help her recognize that when she\"owns someone's emotions\", her anxiety symptoms worsen. Worked with patient to be more present with someone without thinking she needs to solve or fix anything for them. Worked with patient so she can remain present and listen, and not focus on removing others' discomfort as the goal. Worked with patient to begin exploring her own unfinished emotional issues, stressors and difficulties she has experienced that continue to take up space in her mind and affect present relationships. Allowed for a safe space for patient to begin recognizing root causes and working through the emotions.    During this session, this clinician used the following therapeutic modalities: Cognitive Behavioral Therapy, Mindfulness-based Strategies, and Supportive Psychotherapy    Substance Abuse was not addressed during this session. If the client is diagnosed with a co-occurring substance use disorder, please indicate any changes in the frequency or amount of use: n/a. Stage of change for addressing substance use diagnoses: No substance use/Not applicable    ASSESSMENT:  Rachael Petersen presents with a Euthymic/ normal mood.     her affect is Normal range and intensity, which is congruent, with her mood and the content of the session. The client has made progress on their goals.     Rachael Petersen presents with a none risk of suicide, none risk of self-harm, and none risk of harm to others.    For any risk assessment that surpasses a \"low\" rating, a safety plan must be developed.    A safety plan was indicated: no  If yes, describe in detail n/a    PLAN: Between sessions, " Rahcael Petersen will continue to use coping strategies discussed to manage anxiety and depression. At the next session, the therapist will use Cognitive Behavioral Therapy, Mindfulness-based Strategies, and Supportive Psychotherapy to address anxiety, depression and considering divorce.    Behavioral Health Treatment Plan and Discharge Planning: Rachael Petersen is aware of and agrees to continue to work on their treatment plan. They have identified and are working toward their discharge goals. yes    Depression Follow-up Plan Completed: Not applicable    Visit start and stop times:    01/29/25  Start Time: 1602  Stop Time: 1654  Total Visit Time: 52 minutes

## 2025-02-07 ENCOUNTER — TELEMEDICINE (OUTPATIENT)
Age: 35
End: 2025-02-07

## 2025-02-07 ENCOUNTER — CLINICAL SUPPORT (OUTPATIENT)
Dept: FAMILY MEDICINE CLINIC | Facility: CLINIC | Age: 35
End: 2025-02-07

## 2025-02-07 DIAGNOSIS — Z51.81 MEDICATION MONITORING ENCOUNTER: Primary | ICD-10-CM

## 2025-02-07 DIAGNOSIS — Z00.00 ANNUAL PHYSICAL EXAM: ICD-10-CM

## 2025-02-07 DIAGNOSIS — F41.9 ANXIETY AND DEPRESSION: Primary | ICD-10-CM

## 2025-02-07 DIAGNOSIS — F32.A ANXIETY AND DEPRESSION: Primary | ICD-10-CM

## 2025-02-07 PROCEDURE — 36415 COLL VENOUS BLD VENIPUNCTURE: CPT

## 2025-02-07 PROCEDURE — 90834 PSYTX W PT 45 MINUTES: CPT

## 2025-02-07 NOTE — PSYCH
"Behavioral Health Psychotherapy Progress Note    Psychotherapy Provided: Individual Psychotherapy     1. Anxiety and depression            Goals addressed in session: Goal 1     DATA: Worked with patient this session how she can accept accountability of her own emotions and not try to take them on for someone else. Worked with patient to help her recognize that when she\"owns someone's emotions\", her anxiety symptoms worsen. Worked with patient to be more present with someone without thinking she needs to solve or fix anything for them. Worked with patient so she can remain present and listen, and not focus on removing others' discomfort as the goal. Worked with patient to begin exploring her own unfinished emotional issues, stressors and difficulties she has experienced that continue to take up space in her mind and affect present relationships. Allowed for a safe space for patient to begin recognizing root causes and working through the emotions. Allowed for patient to identify the guidelines patient has determined for herself that dictate how she wants to be treated and what types of interactions she is willing to accept from others. Explored times where the patient found herself in a position where she unfairly and inappropriately took responsibility for the emotions and needs of others. Worked with patient to help develop boundaries to start to feel less manipulated, violated and mistreated by others. Worked with patient to determine who to let into her life and to what extent. Worked with patient to identify what behaviors from others are acceptable and what behaviors from others might lead to discomfort or distress.       During this session, this clinician used the following therapeutic modalities: Cognitive Behavioral Therapy, Mindfulness-based Strategies, and Supportive Psychotherapy    Substance Abuse was not addressed during this session. If the client is diagnosed with a co-occurring substance use " "disorder, please indicate any changes in the frequency or amount of use: n/a. Stage of change for addressing substance use diagnoses: No substance use/Not applicable    ASSESSMENT:  Rachael Petersen presents with a Euthymic/ normal mood.     her affect is Normal range and intensity, which is congruent, with her mood and the content of the session. The client has made progress on their goals.     Rachael Petersen presents with a none risk of suicide, none risk of self-harm, and none risk of harm to others.    For any risk assessment that surpasses a \"low\" rating, a safety plan must be developed.    A safety plan was indicated: no  If yes, describe in detail n/a    PLAN: Between sessions, Rachael Petersen will continue to use coping strategies discussed to manage anxiety and depression. At the next session, the therapist will use Cognitive Behavioral Therapy, Mindfulness-based Strategies, and Supportive Psychotherapy to address anxiety, depression and marital issues.    Behavioral Health Treatment Plan and Discharge Planning: Rachael Petersen is aware of and agrees to continue to work on their treatment plan. They have identified and are working toward their discharge goals. yes    Depression Follow-up Plan Completed: Not applicable    Visit start and stop times:    02/07/25  Start Time: 1200  Stop Time: 1242  Total Visit Time: 42 minutes      Virtual Regular Visit    Verification of patient location:    Patient is located at Other in the following state in which I hold an active license [place]      Assessment/Plan:    Problem List Items Addressed This Visit     Anxiety and depression - Primary       Goals addressed in session: Goal 1     Depression Follow-up Plan Completed: Not applicable    Reason for visit is No chief complaint on file.       Encounter provider Sue Cardenas LCSW      Recent Visits  No visits were found meeting these conditions.  Showing recent visits within past 7 days and meeting all other " requirements  Today's Visits  Date Type Provider Dept   02/07/25 Telemedicine Sue Cardenas LCSW [facility] [place]   Showing today's visits and meeting all other requirements  Future Appointments  No visits were found meeting these conditions.  Showing future appointments within next 150 days and meeting all other requirements       The patient was identified by name and date of birth. Rachael Petersen was informed that this is a telemedicine visit and that the visit is being conducted throughthe Epic Embedded platform. She agrees to proceed..  My office door was closed. No one else was in the room.  She acknowledged consent and understanding of privacy and security of the video platform. The patient has agreed to participate and understands they can discontinue the visit at any time.      HPI     Past Medical History:   Diagnosis Date   • Allergic within the last 3 years    Ron Pedro, seasonal   • Anxiety 2011   • Arthritis    • Bronchitis 09/21/2023   • Depression    • Fall from slipping 08/13/2023   • Head injury 08/13/2023   • Migraines    • Neck strain 08/13/2023   • Obstructive sleep apnea on CPAP 12/08/2024   • Post-concussion syndrome 08/15/2023       Past Surgical History:   Procedure Laterality Date   • SHOULDER ARTHROSCOPY Left    • SHOULDER SURGERY     • WISDOM TOOTH EXTRACTION     • WISDOM TOOTH EXTRACTION         Current Outpatient Medications   Medication Sig Dispense Refill   • escitalopram (LEXAPRO) 20 mg tablet TAKE 1 TABLET DAILY 90 tablet 3   • hydrOXYzine HCL (ATARAX) 50 mg tablet Take 1 tablet (50 mg total) by mouth 3 (three) times a day as needed for itching 90 tablet 0   • meloxicam (MOBIC) 15 mg tablet Take 15 mg by mouth daily     • Omeprazole 20 MG TBEC Take by mouth     • ondansetron (ZOFRAN) 4 mg tablet Take 1 tablet (4 mg total) by mouth every 8 (eight) hours as needed for nausea or vomiting for up to 20 doses (Patient not taking: Reported  on 12/31/2024) 20 tablet 0   • oxybutynin (DITROPAN) 5 mg tablet TAKE 1 TABLET IN THE MORNING 90 tablet 3   • traZODone (DESYREL) 100 mg tablet TAKE 1 TABLET DAILY AT BEDTIME 90 tablet 3     No current facility-administered medications for this visit.        Allergies   Allergen Reactions   • Bupropion Hives   • Sulfamethoxazole-Trimethoprim Hives

## 2025-02-08 ENCOUNTER — RESULTS FOLLOW-UP (OUTPATIENT)
Age: 35
End: 2025-02-08

## 2025-02-08 LAB
ALBUMIN SERPL-MCNC: 4.4 G/DL (ref 3.6–5.1)
ALBUMIN/GLOB SERPL: 1.4 (CALC) (ref 1–2.5)
ALP SERPL-CCNC: 92 U/L (ref 31–125)
ALT SERPL-CCNC: 16 U/L (ref 6–29)
AST SERPL-CCNC: 15 U/L (ref 10–30)
BASOPHILS # BLD AUTO: 60 CELLS/UL (ref 0–200)
BASOPHILS NFR BLD AUTO: 0.5 %
BILIRUB SERPL-MCNC: 0.4 MG/DL (ref 0.2–1.2)
BUN SERPL-MCNC: 14 MG/DL (ref 7–25)
BUN/CREAT SERPL: NORMAL (CALC) (ref 6–22)
CALCIUM SERPL-MCNC: 9.5 MG/DL (ref 8.6–10.2)
CHLORIDE SERPL-SCNC: 104 MMOL/L (ref 98–110)
CHOLEST SERPL-MCNC: 196 MG/DL
CHOLEST/HDLC SERPL: 6.1 (CALC)
CO2 SERPL-SCNC: 25 MMOL/L (ref 20–32)
CREAT SERPL-MCNC: 0.82 MG/DL (ref 0.5–0.97)
EOSINOPHIL # BLD AUTO: 216 CELLS/UL (ref 15–500)
EOSINOPHIL NFR BLD AUTO: 1.8 %
ERYTHROCYTE [DISTWIDTH] IN BLOOD BY AUTOMATED COUNT: 14 % (ref 11–15)
GFR/BSA.PRED SERPLBLD CYS-BASED-ARV: 96 ML/MIN/1.73M2
GLOBULIN SER CALC-MCNC: 3.1 G/DL (CALC) (ref 1.9–3.7)
GLUCOSE SERPL-MCNC: 85 MG/DL (ref 65–99)
HCT VFR BLD AUTO: 42.1 % (ref 35–45)
HDLC SERPL-MCNC: 32 MG/DL
HGB BLD-MCNC: 13.4 G/DL (ref 11.7–15.5)
LDLC SERPL CALC-MCNC: 137 MG/DL (CALC)
LYMPHOCYTES # BLD AUTO: 2340 CELLS/UL (ref 850–3900)
LYMPHOCYTES NFR BLD AUTO: 19.5 %
MCH RBC QN AUTO: 26.5 PG (ref 27–33)
MCHC RBC AUTO-ENTMCNC: 31.8 G/DL (ref 32–36)
MCV RBC AUTO: 83.2 FL (ref 80–100)
MONOCYTES # BLD AUTO: 996 CELLS/UL (ref 200–950)
MONOCYTES NFR BLD AUTO: 8.3 %
NEUTROPHILS # BLD AUTO: 8388 CELLS/UL (ref 1500–7800)
NEUTROPHILS NFR BLD AUTO: 69.9 %
NONHDLC SERPL-MCNC: 164 MG/DL (CALC)
PLATELET # BLD AUTO: 352 THOUSAND/UL (ref 140–400)
PMV BLD REES-ECKER: 11.1 FL (ref 7.5–12.5)
POTASSIUM SERPL-SCNC: 4.5 MMOL/L (ref 3.5–5.3)
PROT SERPL-MCNC: 7.5 G/DL (ref 6.1–8.1)
RBC # BLD AUTO: 5.06 MILLION/UL (ref 3.8–5.1)
SODIUM SERPL-SCNC: 138 MMOL/L (ref 135–146)
TRIGL SERPL-MCNC: 138 MG/DL
WBC # BLD AUTO: 12 THOUSAND/UL (ref 3.8–10.8)

## 2025-02-10 ENCOUNTER — OFFICE VISIT (OUTPATIENT)
Dept: FAMILY MEDICINE CLINIC | Facility: CLINIC | Age: 35
End: 2025-02-10

## 2025-02-10 VITALS
BODY MASS INDEX: 42.88 KG/M2 | TEMPERATURE: 98.2 F | OXYGEN SATURATION: 97 % | HEIGHT: 62 IN | RESPIRATION RATE: 14 BRPM | WEIGHT: 233 LBS | SYSTOLIC BLOOD PRESSURE: 110 MMHG | DIASTOLIC BLOOD PRESSURE: 70 MMHG | HEART RATE: 86 BPM

## 2025-02-10 DIAGNOSIS — E66.01 MORBID OBESITY WITH BMI OF 40.0-44.9, ADULT (HCC): ICD-10-CM

## 2025-02-10 DIAGNOSIS — R79.89 ABNORMAL CBC: ICD-10-CM

## 2025-02-10 DIAGNOSIS — F32.2 SEVERE MAJOR DEPRESSIVE DISORDER (HCC): Primary | ICD-10-CM

## 2025-02-10 DIAGNOSIS — H54.7 VISION DECREASED: ICD-10-CM

## 2025-02-10 PROCEDURE — 99214 OFFICE O/P EST MOD 30 MIN: CPT | Performed by: NURSE PRACTITIONER

## 2025-02-10 PROCEDURE — 86580 TB INTRADERMAL TEST: CPT

## 2025-02-10 NOTE — PROGRESS NOTES
Name: Rachael Petersen      : 1990      MRN: [id number]  Encounter Provider: CLARENCE Cummins  Encounter Date: 2/10/2025   Encounter department:  [facility]  :  Assessment & Plan  Severe major depressive disorder (HCC)  Well controlled at this time. Pt following .  She denies SI/HI.          Morbid obesity with BMI of 40.0-44.9, adult (HCC)  Goal to consume 500 to 1,000 fewer calories per day for a 1-2 lbs weight loss per week. Increase exercise to 30 min, 5 times a week as tolerated for a goal of 150 mins a week. Calorie tracking apps such as myfitness pal can be helpful for keeping track of calorie intake. Decrease simple carbohydrates (white bread, pasta, white rice), and increasing vegetables, fruit, and protein.  Increase water.           Abnormal CBC  Will repeat and refer to hematology if continued abnormality.   Orders:  •  CBC and differential; Future    Vision decreased  Pt was not wearing glasses for apt. Advised glasses for distance to avoid eye strain.              Depression Screening and Follow-up Plan: Patient was screened for depression during today's encounter. They screened negative with a PHQ-9 score of 3.      History of Present Illness   Pt here for work physical with no concerns.       Review of Systems   Constitutional: Negative.  Negative for chills and fever.   HENT: Negative.  Negative for ear pain and sore throat.    Eyes:  Negative for pain and visual disturbance.   Respiratory: Negative.  Negative for cough and shortness of breath.    Cardiovascular: Negative.  Negative for chest pain and palpitations.   Gastrointestinal: Negative.  Negative for abdominal pain and vomiting.   Genitourinary: Negative.  Negative for dysuria and hematuria.   Musculoskeletal: Negative.  Negative for arthralgias and back pain.   Skin:  Negative for color change and rash.   Neurological: Negative.  Negative for  "seizures and syncope.   Hematological: Negative.    Psychiatric/Behavioral: Negative.  Negative for decreased concentration, dysphoric mood and suicidal ideas. The patient is not nervous/anxious.    All other systems reviewed and are negative.      Objective   /70 (BP Location: Left arm, Patient Position: Sitting, Cuff Size: Large)   Pulse 86   Temp 98.2 °F (36.8 °C) (Temporal)   Resp 14   Ht 5' 2\" (1.575 m)   Wt 106 kg (233 lb)   SpO2 97%   BMI 42.62 kg/m²      Physical Exam  Vitals and nursing note reviewed.   Constitutional:       General: She is not in acute distress.     Appearance: She is well-developed.   HENT:      Head: Normocephalic and atraumatic.      Right Ear: Tympanic membrane, ear canal and external ear normal.      Left Ear: Tympanic membrane, ear canal and external ear normal.      Nose: Nose normal.      Mouth/Throat:      Mouth: Mucous membranes are moist.      Pharynx: Oropharynx is clear.   Eyes:      General:         Right eye: No discharge.         Left eye: No discharge.      Conjunctiva/sclera: Conjunctivae normal.      Pupils: Pupils are equal, round, and reactive to light.   Cardiovascular:      Rate and Rhythm: Normal rate and regular rhythm.      Heart sounds: No murmur heard.  Pulmonary:      Effort: Pulmonary effort is normal. No respiratory distress.      Breath sounds: Normal breath sounds.   Abdominal:      General: Bowel sounds are normal.      Palpations: Abdomen is soft.      Tenderness: There is no abdominal tenderness.   Musculoskeletal:         General: No swelling.      Cervical back: Neck supple.      Right lower leg: No edema.      Left lower leg: No edema.   Skin:     General: Skin is warm and dry.      Capillary Refill: Capillary refill takes less than 2 seconds.   Neurological:      Mental Status: She is alert and oriented to person, place, and time.   Psychiatric:         Mood and Affect: Mood normal.         Behavior: Behavior normal.         Thought " Content: Thought content normal.         Judgment: Judgment normal.       Administrative Statements   I have spent a total time of 32 minutes in caring for this patient on the day of the visit/encounter including Instructions for management, Patient and family education, Documenting in the medical record, Reviewing / ordering tests, medicine, procedures  , and Obtaining or reviewing history  .

## 2025-02-10 NOTE — PATIENT INSTRUCTIONS
Patient Education     Depression in adults - Discharge instructions   The Basics   Written by the doctors and editors at [facility]   What are discharge instructions? -- Discharge instructions are information about how to take care of yourself after getting medical care for a health problem.  What is depression? -- Depression is a disorder that makes you sad, but it is different than normal sadness. Depression can make it hard for you to work, study, do everyday tasks, or interact with others.  Get help right away if you are thinking of hurting or killing yourself! -- Sometimes, people with depression think of hurting or killing themselves. If you ever feel like you might hurt yourself or someone else, help is available:   In the [place], contact the [employer] Suicide & Crisis Lifeline:   To speak to someone, call or text [employer].   To talk to someone online, go to www.[employer]/chat.   Call your doctor or nurse, and tell them that it is an emergency.   Call for an ambulance (in the [place] and [place], call 9-1-1).   Go to the emergency department at the nearest hospital.  How do I care for myself at home? -- Ask the doctor or nurse what you should do when you go home. Make sure that you understand exactly what you need to do to care for yourself. Ask questions if there is anything you do not understand.  You should also:   Take your medicines exactly as your doctor tells you, so you get the correct amount of each medicine.   Some people find it helpful to use reminders or a weekly pill box.   If you are having side effects, talk to your doctor. Many side effects go away after a few days or weeks of use.   Go to all of your appointments. This might include counseling sessions, support groups, or appointments for medical treatments.   Avoid alcohol and recreational drugs.   Try to get regular physical activity or exercise. Even gentle forms of activity, like walking, are good for your health.   Find healthy ways to handle stress,  like talking to others or hobbies you enjoy. Relaxation exercises, meditation, and activities like yoga or leila chi can help you handle stress.   Try to get at least 8 hours of sleep every night. If you have trouble sleeping, you can do things to improve your sleep habits. For example, you can:   Avoid drinking alcohol or caffeine in the late afternoon or evening.   Try to go to bed and wake up at the same time every day.   Limit your naps during the day, and don't nap for more than 30 minutes at a time.   Speak with trusted family or friends about your depression and how they can help.   Try to eat a healthy diet that includes plenty of fruits, vegetables, whole grains, and low-fat dairy products. This can help improve your overall health.  What follow-up care do I need? -- Depression needs to be watched closely. Your doctor or nurse should ask you to make a follow-up appointment to check on your progress. Go to these appointments.  When should I call the doctor? -- Call for advice if:   You have ongoing depression that does not get better within 1 or 2 weeks.   Your depression is getting worse.   Your family or friends say that they are worried about you.   You continue to have problems eating or sleeping.   You are having trouble functioning at work, home, or school.  All topics are updated as new evidence becomes available and our peer review process is complete.  This topic retrieved from [employer] on: Feb 26, 2024.  Topic 260785 Version 1.0  Release: 32.2.4 - C32.56  © 2024 UpToDate, Inc. and/or its affiliates. All rights reserved.  Consumer Information Use and Disclaimer   Disclaimer: This generalized information is a limited summary of diagnosis, treatment, and/or medication information. It is not meant to be comprehensive and should be used as a tool to help the user understand and/or assess potential diagnostic and treatment options. It does NOT include all information about conditions, treatments,  medications, side effects, or risks that may apply to a specific patient. It is not intended to be medical advice or a substitute for the medical advice, diagnosis, or treatment of a health care provider based on the health care provider's examination and assessment of a patient's specific and unique circumstances. Patients must speak with a health care provider for complete information about their health, medical questions, and treatment options, including any risks or benefits regarding use of medications. This information does not endorse any treatments or medications as safe, effective, or approved for treating a specific patient. UpToDate, Inc. and its affiliates disclaim any warranty or liability relating to this information or the use thereof.The use of this information is governed by the Terms of Use, available at https://www.woltersBembauwer.com/en/know/clinical-effectiveness-terms. 2024© UpToDate, Inc. and its affiliates and/or licensors. All rights reserved.  Copyright   © 2024 UpToDate, Inc. and/or its affiliates. All rights reserved.

## 2025-02-11 ENCOUNTER — OFFICE VISIT (OUTPATIENT)
Dept: SURGICAL ONCOLOGY | Facility: CLINIC | Age: 35
End: 2025-02-11
Payer: COMMERCIAL

## 2025-02-11 VITALS
HEART RATE: 77 BPM | BODY MASS INDEX: 43.06 KG/M2 | SYSTOLIC BLOOD PRESSURE: 126 MMHG | OXYGEN SATURATION: 94 % | HEIGHT: 62 IN | RESPIRATION RATE: 18 BRPM | WEIGHT: 234 LBS | DIASTOLIC BLOOD PRESSURE: 80 MMHG | TEMPERATURE: 98.6 F

## 2025-02-11 DIAGNOSIS — Z80.3 FAMILY HISTORY OF BREAST CANCER: ICD-10-CM

## 2025-02-11 DIAGNOSIS — Z91.89 AT HIGH RISK FOR BREAST CANCER: ICD-10-CM

## 2025-02-11 DIAGNOSIS — Z15.09 MONOALLELIC MUTATION OF CHEK2 GENE IN FEMALE PATIENT: Primary | ICD-10-CM

## 2025-02-11 DIAGNOSIS — Z12.31 BREAST CANCER SCREENING BY MAMMOGRAM: ICD-10-CM

## 2025-02-11 DIAGNOSIS — N63.11 MASS OF UPPER OUTER QUADRANT OF RIGHT BREAST: ICD-10-CM

## 2025-02-11 DIAGNOSIS — Z15.01 MONOALLELIC MUTATION OF CHEK2 GENE IN FEMALE PATIENT: Primary | ICD-10-CM

## 2025-02-11 DIAGNOSIS — Z15.89 MONOALLELIC MUTATION OF CHEK2 GENE IN FEMALE PATIENT: Primary | ICD-10-CM

## 2025-02-11 DIAGNOSIS — Z91.89 INCREASED RISK OF BREAST CANCER: ICD-10-CM

## 2025-02-11 DIAGNOSIS — Z15.02 MONOALLELIC MUTATION OF CHEK2 GENE IN FEMALE PATIENT: Primary | ICD-10-CM

## 2025-02-11 PROCEDURE — G2211 COMPLEX E/M VISIT ADD ON: HCPCS

## 2025-02-11 PROCEDURE — 99243 OFF/OP CNSLTJ NEW/EST LOW 30: CPT

## 2025-02-11 NOTE — PROGRESS NOTES
Name: Rachael Petersen      : 1990      MRN: [id number]  Encounter Provider: CLARENCE Jeter  Encounter Date: 2025   Encounter department: [facility]  :  Assessment & Plan  Monoallelic mutation of CHEK2 gene in female patient  Ms. Rachael Petersen is a 34 y.o. 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 Pt also has a notable family hx of breast cancer, including a paternal aunt (dx age 49). Other cancers identified in the family include thyroid cancer in her father (dx age 45), pancreas cancer in her paternal grandmother (dx age 60), as well as ovarian, colon, and breast cancer on the maternal side. She continues enhanced breast cancer screening with annual breast MRI, with plans to start annual mammograms at age 38 in light of family hx. Her last bilateral mammogram was on 2022, which demonstrated BI-RADS 1, category 2 density (Fibroglandular tissue).  Bilateral breast MRI was also completed on 2024, with benign findings. Today we discussed CHEK2 associated breast risk at length, (23-27% lifetime risk) including options for risk reduction such as lifestyle modifications (exercise, diet, alcohol limitation), as well as chemoprevention. Pt will inquire with family members regarding hormone status and will consider this option moving forward. Upon exam today, I did note a new firm lesion at the 10:00-11 position of the right breast, approx [id number]. Script provided for right diagnostic mammogram and US for further evaluation. Assuming benign findings, we will plan for breast MRI in August and 1 year follow up. I encouraged pt to continue with self-breast awareness, and clinical breast exams (CBE) every 6 months (alternating with GYN- appmt in July). No other concerning findings today. No enlarged lymph nodes, skin changes, or  "nipple discharge identified. I encouraged pt to promptly call if any questions or concerns prior to next scheduled appmt. I will see the patient back in 1 year or sooner should the need arise. All questions were answered today.  Orders:  •  Mammo diagnostic right w 3d and cad; Future  •  US breast right limited (diagnostic); Future  •  MRI breast bilateral w and wo contrast w cad; Future    At high risk for breast cancer  Orders:  •  Mammo diagnostic right w 3d and cad; Future  •  US breast right limited (diagnostic); Future  •  MRI breast bilateral w and wo contrast w cad; Future    Family history of breast cancer  Orders:  •  Mammo diagnostic right w 3d and cad; Future  •  US breast right limited (diagnostic); Future  •  MRI breast bilateral w and wo contrast w cad; Future    Mass of upper outer quadrant of right breast  Orders:  •  Mammo diagnostic right w 3d and cad; Future  •  US breast right limited (diagnostic); Future      History of Present Illness   Rachael Petersen is a 34 y.o. 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 Pt reports current pre menopausal state, menarche age 9, and 0 live births. She denies previous use of hormone replacement therapy (HRT), known SIVAN exposure, as well as mantle field radiation. She denies any known Ashkenazi Jehovah's witness descent. She denies any breast changes, palpable concerns, or nipple discharge. She confirms there are no changes to family history as per her chart. She reports continued meetings with weight management for attempted weight loss. She is unsure of hormone status / type of breast cancer present in family members with breast cancer but will inquire. She overall is feeling comfortable with her risk however feels like \"a ticking time bomb\".       Review of Systems   Constitutional:  Negative for activity change, appetite change, fatigue, fever and " "unexpected weight change.   Skin: Negative.    Neurological: Negative.    Psychiatric/Behavioral:  Negative for confusion.     A complete review of systems is negative other than that noted above in the HPI.      Objective   /80 (BP Location: Left arm, Patient Position: Sitting, Cuff Size: Large)   Pulse 77   Temp 98.6 °F (37 °C) (Temporal)   Resp 18   Ht 5' 2\" (1.575 m)   Wt 106 kg (234 lb)   SpO2 94%   BMI 42.80 kg/m²       Physical Exam  Vitals and nursing note reviewed.   Constitutional:       Appearance: Normal appearance. She is obese.   HENT:      Head: Normocephalic and atraumatic.   Chest:      Chest wall: No mass.   Breasts:     Right: Mass present. No swelling, bleeding, inverted nipple, nipple discharge, skin change or tenderness.      Left: No swelling, bleeding, inverted nipple, mass, nipple discharge, skin change or tenderness.       Lymphadenopathy:      Upper Body:      Right upper body: No supraclavicular or axillary adenopathy.      Left upper body: No supraclavicular or axillary adenopathy.   Neurological:      General: No focal deficit present.      Mental Status: She is alert and oriented to person, place, and time.      Cranial Nerves: No cranial nerve deficit.      Sensory: No sensory deficit.      Motor: No weakness.      Coordination: Coordination normal.      Gait: Gait normal.      Deep Tendon Reflexes: Reflexes normal.   Psychiatric:         Mood and Affect: Mood normal.         Behavior: Behavior normal.         Judgment: Judgment normal.          "

## 2025-02-11 NOTE — ASSESSMENT & PLAN NOTE
Ms. Rachael Petersen is a 34 y.o. 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 Genetic testing was positive. A pathogenic variant was identified in CHEK2. She continues enhanced breast cancer screening with annual . Her last bilateral mammogram was on  12/27/2022,  which demonstrated BI-RADS 1, category 2 density (Fibroglandular tissue).  Bilateral breast MRI was also completed on 08/21/2024, with benign findings.    She denies breast changes as well as changes in family history. There were no concerning findings upon clinical breast exam (CBE) today.  S/s of breast cancer were reviewed. I encouraged pt to promptly call if any questions or concerns prior to next scheduled appmt. I will see the patient back in 1 year or sooner should the need arise. All questions were answered today.

## 2025-02-11 NOTE — PATIENT INSTRUCTIONS
Follow up with Leta in 12 months  Self-breast awareness, promptly inform healthcare provider if any concerns  Clinical breast exam (CBE) every 6 months, alternate with GYN/PCP and Leta (Surgical Oncology)  Mammogram in 1 year  Breast MRI due August 2025    Consider lifestyle modifications for reducing breast cancer risk, including:  -Diet  -Exercise  -Limiting/ avoiding alcohol use  -Quitting/ avoiding smoking

## 2025-02-11 NOTE — ASSESSMENT & PLAN NOTE
Orders:  •  Mammo diagnostic right w 3d and cad; Future  •  US breast right limited (diagnostic); Future  •  MRI breast bilateral w and wo contrast w cad; Future

## 2025-02-11 NOTE — ASSESSMENT & PLAN NOTE
Ms. Rachael Petersen is a 34 y.o. 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 Pt also has a notable family hx of breast cancer, including a paternal aunt (dx age 49). Other cancers identified in the family include thyroid cancer in her father (dx age 45), pancreas cancer in her paternal grandmother (dx age 60), as well as ovarian, colon, and breast cancer on the maternal side. She continues enhanced breast cancer screening with annual breast MRI, with plans to start annual mammograms at age 38 in light of family hx. Her last bilateral mammogram was on 12/27/2022, which demonstrated BI-RADS 1, category 2 density (Fibroglandular tissue).  Bilateral breast MRI was also completed on 08/21/2024, with benign findings. Today we discussed CHEK2 associated breast risk at length, (23-27% lifetime risk) including options for risk reduction such as lifestyle modifications (exercise, diet, alcohol limitation), as well as chemoprevention. Pt will inquire with family members regarding hormone status and will consider this option moving forward. Upon exam today, I did note a new firm lesion at the 10:00-11 position of the right breast, approx [id number]. Script provided for right diagnostic mammogram and US for further evaluation. Assuming benign findings, we will plan for breast MRI in August and 1 year follow up. I encouraged pt to continue with self-breast awareness, and clinical breast exams (CBE) every 6 months (alternating with GYN- appmt in July). No other concerning findings today. No enlarged lymph nodes, skin changes, or nipple discharge identified. I encouraged pt to promptly call if any questions or concerns prior to next scheduled appmt. I will see the patient back in 1 year or sooner should the need arise. All questions were answered today.  Orders:  •  Mammo diagnostic right w 3d and cad; Future  •  US breast  right limited (diagnostic); Future  •  MRI breast bilateral w and wo contrast w cad; Future

## 2025-02-12 ENCOUNTER — CLINICAL SUPPORT (OUTPATIENT)
Dept: FAMILY MEDICINE CLINIC | Facility: CLINIC | Age: 35
End: 2025-02-12

## 2025-02-12 DIAGNOSIS — Z11.1 PPD SCREENING TEST: Primary | ICD-10-CM

## 2025-02-12 LAB
INDURATION: 0 MM
TB SKIN TEST: NEGATIVE

## 2025-02-12 PROCEDURE — NURSE

## 2025-02-12 NOTE — PROGRESS NOTES
PPD Reading Note  PPD read and results entered in [employer].  Result: 0 mm induration.  Interpretation: Negative  If test not read within 48-72 hours of initial placement, patient advised to repeat in other arm 1-3 weeks after this test.  Allergic reaction: no     CLEOPATRA Mcadams

## 2025-02-13 ENCOUNTER — CLINICAL SUPPORT (OUTPATIENT)
Dept: BARIATRICS | Facility: CLINIC | Age: 35
End: 2025-02-13

## 2025-02-13 DIAGNOSIS — R63.5 ABNORMAL WEIGHT GAIN: Primary | ICD-10-CM

## 2025-02-13 PROCEDURE — RECHECK: Performed by: DIETITIAN, REGISTERED

## 2025-02-13 PROCEDURE — WMDI30: Performed by: DIETITIAN, REGISTERED

## 2025-02-13 NOTE — PROGRESS NOTES
Weight Management Medical Nutrition Assessment  Patient presents for menu planning session. Seen by [facility] provider on 12/31/24. Plans to start Zepbound 2.5mg.  Has had  weight concern for last 5 years. She did see RD through her job but diet plan was too restrictive/only focused on calories not the whole lifestyle. Feels her difficulties with diet stem from challenges at home (autistic son who is picky with his food choice).       Goals for this session:  Try yoga poses 2 days a week- Monday and Wednesday 7am  Consume breakfast at least 5 days a week (include 1 weekend day in the 5 days)  Iced tea on Fridays and Tuesdays; on non iced-tea days include 8 additional ounces of water    Patient seen by Medical Provider in past 6 months:  yes 12/31/24 Charlie Cheney [facility] provider   Requested to schedule appointment with Medical Provider: Yes 3/25/25 Dr. Tapia    Anthropometric Measurements  Start Weight (#) & Date: 236.8 (12/31/24 [facility] provider)  Current Weight (#):232.8  TBW % Change from start weight:-  Ideal Body Weight (#):110  Goal Weight (#):150  Highest:245 (Nov 2024)  Lowest:    Weight Loss History  Previous weight loss attempts: Diet and Exercise and Physician Supervised Weight Loss Program. Worked with RD prior and they wanted to her do 1000 calorie diet .  Pil    Food and Nutrition Related History  Wake up: 6a on work days, 7:30-8a   Bed Time:10:30a  Occupation:more desk work,  in school district  Sleep:6 hours of sleep, recent dx of SHAUNA, still getting used to the Cpap, also deals with insomnia (on trazadone to help her fall asleep)    Food Recall -updates in bold  10a muffin from giant, 12oz iced coffee (international delight coffee cartons) 8am kodiak microwaveable cups every other day, cold brew coffee with muscle milk as creamer  10:30a granola bar or muffin bar by nature Hartley  12p L:leftovers or turkey hoagie ( about 2 x  week, 6 inch, cheese light amaro, sweet peppers)  Home from work  3:45p   4p yoplait protein yogurt  5-5:30p D: protein, starch , vegetable (susage with mashed potatoes and mixed vegetables)  -wife portions out the plates and give large portions, does not always finished  Being more mindful over her portion intakes  Wife is also using leaner cooking methods (baking versus frying)  S: ice cream (2 scoops) vanilla or cookies and cream   Frozen fruit bars but not every night     Beverages: 48-64 oz water increasing started using cirkul water bottle, iced cofee, 1-2 glasses of ice tea iced tea every other day  Alcohol: rare  Volume of beverage intake: see above    Weekends: Worse, hoem with her family more which can lead to more intakes  Cravings: ice cream   Trouble area of day:after dinner once her son is in bed    Frequency of Eating out: 1 x week outside work, 2 x week takeout hoagie  Food restrictions:none  Cooking: wife  Food Shopping: wife  -adoptive son is autistic    Physical Activity Intake  Activity:doing pilates bar about 15 min a day  Frequency:daily  Physical limitations/barriers to exercise: some knee pain    Estimated Needs  Energy  SECA: BMR:n/a      X 1.3 -1000 =  [place] [address] [employer] Needs: BMR : 1722   1-2# loss weekly sedentary:  4742-1545             1-2# loss weekly lightly active:4826-5251  Maintenance calories for sedentary activity level: 2067  Protein:60-75g      (1.2-1.5g/kg IBW)  Fluid: 58oz     (35mL/kg IBW)    Nutrition Diagnosis  Yes;    Overweight/obesity  related to Excess energy intake as evidenced by  BMI more than normative standard for age and sex (obesity-grade III 40+)       Nutrition Intervention    Nutrition Prescription  Calories:5586-4524  Protein:75-85g  Fluid:64oz    Meal Plan (Boogie/Pro/Carb)  Breakfast:200-300/15-20  Snack:100-150/5-10  Lunch:400-500/25-40  Snack:100-150/5-10  Dinner:400-500/25-40  Snack:100-150/5-10    Nutrition Education:    Calorie controlled menu  Lean protein food choices  Healthy snack options  Food journaling  tips      Nutrition Counseling:  Strategies: meal planning, portion sizes, healthy snack choices, hydration, fiber intake, protein intake, exercise, food journal      Monitoring and Evaluation:  Evaluation criteria:  Energy Intake  Meet protein needs  Maintain adequate hydration  Monitor weekly weight  Meal planning/preparation  Food journal   Decreased portions at mealtimes and snacks  Physical activity     Barriers to learning:none  Readiness to change: Action:  (Changing behavior)  Comprehension: very good  Expected Compliance: good

## 2025-02-13 NOTE — PATIENT INSTRUCTIONS
Goals for this session:  Try yoga poses 2 days a week- Monday and Wednesday 7am  Consume breakfast at least 5 days a week (include 1 weekend day in the 5 days)  Iced tea on Fridays and Tuesdays; on non iced-tea days include 8 additional ounces of water

## 2025-02-18 DIAGNOSIS — F32.A ANXIETY AND DEPRESSION: ICD-10-CM

## 2025-02-18 DIAGNOSIS — F41.9 ANXIETY AND DEPRESSION: ICD-10-CM

## 2025-02-19 RX ORDER — HYDROXYZINE HYDROCHLORIDE 50 MG/1
50 TABLET, FILM COATED ORAL 3 TIMES DAILY PRN
Qty: 90 TABLET | Refills: 0 | Status: SHIPPED | OUTPATIENT
Start: 2025-02-19

## 2025-02-26 ENCOUNTER — SOCIAL WORK (OUTPATIENT)
Age: 35
End: 2025-02-26

## 2025-02-26 DIAGNOSIS — F41.9 ANXIETY AND DEPRESSION: Primary | ICD-10-CM

## 2025-02-26 DIAGNOSIS — F32.A ANXIETY AND DEPRESSION: Primary | ICD-10-CM

## 2025-02-26 PROCEDURE — 90834 PSYTX W PT 45 MINUTES: CPT

## 2025-02-26 NOTE — PSYCH
"Behavioral Health Psychotherapy Progress Note    Psychotherapy Provided: Individual Psychotherapy     1. Anxiety and depression            Goals addressed in session: Goal 1     DATA: Worked with patient this session how she can accept accountability of her own emotions and not try to take them on for someone else. Worked with patient to help her recognize that when she\"owns someone's emotions\", her anxiety symptoms worsen. Worked with patient to be more present with someone without thinking she needs to solve or fix anything for them. Worked with patient so she can remain present and listen, and not focus on removing others' discomfort as the goal. Worked with patient to begin exploring her own unfinished emotional issues, stressors and difficulties she has experienced that continue to take up space in her mind and affect present relationships. Allowed for a safe space for patient to begin recognizing root causes and working through the emotions.    During this session, this clinician used the following therapeutic modalities: Cognitive Behavioral Therapy, Mindfulness-based Strategies, and Supportive Psychotherapy    Substance Abuse was not addressed during this session. If the client is diagnosed with a co-occurring substance use disorder, please indicate any changes in the frequency or amount of use: n/a. Stage of change for addressing substance use diagnoses: No substance use/Not applicable    ASSESSMENT:  Rachael Petersen presents with a Euthymic/ normal mood.     her affect is Normal range and intensity, which is congruent, with her mood and the content of the session. The client has made progress on their goals.     Rachael Petersen presents with a none risk of suicide, none risk of self-harm, and none risk of harm to others.    For any risk assessment that surpasses a \"low\" rating, a safety plan must be developed.    A safety plan was indicated: no  If yes, describe in detail n/a    PLAN: Between sessions, " Rachael Petersen will continue to use coping strategies discussed to manage anxiety and depression. At the next session, the therapist will use Cognitive Behavioral Therapy, Mindfulness-based Strategies, and Supportive Psychotherapy to address managing anxiety, depression and self esteem / confidence.     Behavioral Health Treatment Plan and Discharge Planning: Rachael Petersen is aware of and agrees to continue to work on their treatment plan. They have identified and are working toward their discharge goals. yes    Depression Follow-up Plan Completed: Not applicable    Visit start and stop times:    02/26/25  Start Time: 1600  Stop Time: 1652  Total Visit Time: 52 minutes

## 2025-03-04 DIAGNOSIS — F41.9 ANXIETY AND DEPRESSION: ICD-10-CM

## 2025-03-04 DIAGNOSIS — F32.A ANXIETY AND DEPRESSION: ICD-10-CM

## 2025-03-05 RX ORDER — HYDROXYZINE HYDROCHLORIDE 50 MG/1
TABLET, FILM COATED ORAL
Qty: 270 TABLET | Refills: 1 | Status: SHIPPED | OUTPATIENT
Start: 2025-03-05

## 2025-03-12 ENCOUNTER — SOCIAL WORK (OUTPATIENT)
Age: 35
End: 2025-03-12

## 2025-03-12 DIAGNOSIS — F32.A ANXIETY AND DEPRESSION: Primary | ICD-10-CM

## 2025-03-12 DIAGNOSIS — F41.9 ANXIETY AND DEPRESSION: Primary | ICD-10-CM

## 2025-03-12 PROCEDURE — 90834 PSYTX W PT 45 MINUTES: CPT

## 2025-03-12 NOTE — PSYCH
"Behavioral Health Psychotherapy Progress Note    Psychotherapy Provided: Individual Psychotherapy     1. Anxiety and depression            Goals addressed in session: Goal 1     DATA: Patient is waiting for an appointment to determine if medical test results indicate that she has breast cancer. Patient reports experiencing heightened anxiety and uncertainty. Patient expressed concerns about potential outcomes and their impact on her well-being and also care for her son. Patient reports being focused on the \"what if\" scenarios. Discussed mindfulness exercises to ground to present moment. Worked with patient on cognitive reconstructing to challenge catastrophic thinking as well as identifying enjoyable and distracting activities. Worked with patient to focus on practicing on self-compassion and acceptance of uncertainty and deep breathing for anxiety management.  During this session, this clinician used the following therapeutic modalities: Cognitive Behavioral Therapy, Mindfulness-based Strategies, and Supportive Psychotherapy    Substance Abuse was not addressed during this session. If the client is diagnosed with a co-occurring substance use disorder, please indicate any changes in the frequency or amount of use: n/a. Stage of change for addressing substance use diagnoses: No substance use/Not applicable    ASSESSMENT:  Rachael Petersen presents with a Euthymic/ normal and Anxious mood.     her affect is Normal range and intensity, which is congruent, with her mood and the content of the session. The client has made progress on their goals.     Rachael Petersen presents with a none risk of suicide, none risk of self-harm, and none risk of harm to others.    For any risk assessment that surpasses a \"low\" rating, a safety plan must be developed.    A safety plan was indicated: no  If yes, describe in detail n/a    PLAN: Between sessions, Rachael Petersen will continue to use coping strategies discussed to manage " anxiety and depression. Patient will continue monitoring thoughts and emotions and implementing relaxation techniques as needed.   At the next session, the therapist will use Cognitive Behavioral Therapy, Mindfulness-based Strategies, and Supportive Psychotherapy to address barriers, redefine strategies for sustainable change and review progress since last session.       Behavioral Health Treatment Plan and Discharge Planning: Rachael Petersen is aware of and agrees to continue to work on their treatment plan. They have identified and are working toward their discharge goals. yes    Depression Follow-up Plan Completed: Not applicable    Visit start and stop times:    03/12/25  Start Time: 1600  Stop Time: 1652  Total Visit Time: 52 minutes

## 2025-03-14 DIAGNOSIS — E78.00 ELEVATED LDL CHOLESTEROL LEVEL: ICD-10-CM

## 2025-03-14 DIAGNOSIS — E66.01 MORBID OBESITY WITH BMI OF 40.0-44.9, ADULT (HCC): ICD-10-CM

## 2025-03-14 DIAGNOSIS — G47.33 OSA (OBSTRUCTIVE SLEEP APNEA): Primary | ICD-10-CM

## 2025-03-14 DIAGNOSIS — R73.03 PREDIABETES: ICD-10-CM

## 2025-03-14 RX ORDER — TIRZEPATIDE 2.5 MG/.5ML
2.5 INJECTION, SOLUTION SUBCUTANEOUS WEEKLY
Qty: 2 ML | Refills: 0 | Status: SHIPPED | OUTPATIENT
Start: 2025-03-14 | End: 2025-04-11

## 2025-03-26 ENCOUNTER — TELEPHONE (OUTPATIENT)
Dept: BARIATRICS | Facility: CLINIC | Age: 35
End: 2025-03-26

## 2025-03-26 ENCOUNTER — SOCIAL WORK (OUTPATIENT)
Age: 35
End: 2025-03-26

## 2025-03-26 DIAGNOSIS — F41.9 ANXIETY AND DEPRESSION: Primary | ICD-10-CM

## 2025-03-26 DIAGNOSIS — F32.A ANXIETY AND DEPRESSION: Primary | ICD-10-CM

## 2025-03-26 PROCEDURE — 90834 PSYTX W PT 45 MINUTES: CPT

## 2025-03-26 NOTE — TELEPHONE ENCOUNTER
PA for Zepbound 2.5mg SUBMITTED to [employer]     via    [x]CMM-KEY: [id number]  []Surescripts-Case ID #   []Availity-Auth ID # NDC #   []Faxed to plan   []Other website   []Phone call Case ID #     []PA sent as URGENT    All office notes, labs and other pertaining documents and studies sent. Clinical questions answered. Awaiting determination from insurance company.     Turnaround time for your insurance to make a decision on your Prior Authorization can take 7-21 business days.

## 2025-03-26 NOTE — TELEPHONE ENCOUNTER
PA for Zepbound 2.5mg APPROVED     Date(s) approved 3/26/2025 - 7/26/2025    Case #    Patient advised by          [x]MyChart Message  []Phone call   []LMOM  []L/M to call office as no active Communication consent on file  []Unable to leave detailed message as VM not approved on Communication consent       Pharmacy advised by    [x]Fax  []Phone call  []Secure Chat    Specialty Pharmacy    []     Approval letter scanned into Media Yes

## 2025-03-26 NOTE — PSYCH
"Behavioral Health Psychotherapy Progress Note    Psychotherapy Provided: Individual Psychotherapy     1. Anxiety and depression            Goals addressed in session: Goal 1     DATA: Continued to work with the patient to find more adaptive ways of thinking about life problems. Worked with patient to take a practical and systematic approach to the problems in her life and find effective ways to solve them. Continued to work with patient to work through the root of her depression, helping her understand why she feels a certain way, what her triggers are for depression, and what she can do to stay healthy.     During this session, this clinician used the following therapeutic modalities: Cognitive Behavioral Therapy, Mindfulness-based Strategies, and Supportive Psychotherapy    Substance Abuse was not addressed during this session. If the client is diagnosed with a co-occurring substance use disorder, please indicate any changes in the frequency or amount of use: n/a. Stage of change for addressing substance use diagnoses: No substance use/Not applicable    ASSESSMENT:  Rachael Petersen presents with a Euthymic/ normal mood.     her affect is Normal range and intensity, which is congruent, with her mood and the content of the session. The client has made progress on their goals.     Rachael Petersen presents with a none risk of suicide, none risk of self-harm, and none risk of harm to others.    For any risk assessment that surpasses a \"low\" rating, a safety plan must be developed.    A safety plan was indicated: no  If yes, describe in detail n/a    PLAN: Between sessions, Rachael Petersen will continue to use coping strategies discussed to manage anxiety and depression.    At the next session, the therapist will use Cognitive Behavioral Therapy, Mindfulness-based Strategies, and Supportive Psychotherapy to address barriers, redefine strategies for sustainable change and review progress since last session. "       Behavioral Health Treatment Plan and Discharge Planning: Rachael Petersen is aware of and agrees to continue to work on their treatment plan. They have identified and are working toward their discharge goals. yes    Depression Follow-up Plan Completed: Not applicable    Visit start and stop times:    03/26/25  Start Time: 1600  Stop Time: 1652  Total Visit Time: 52 minutes

## 2025-04-08 ENCOUNTER — HOSPITAL ENCOUNTER (OUTPATIENT)
Dept: MAMMOGRAPHY | Facility: CLINIC | Age: 35
Discharge: HOME | End: 2025-04-08
Payer: COMMERCIAL

## 2025-04-08 ENCOUNTER — HOSPITAL ENCOUNTER (OUTPATIENT)
Dept: ULTRASOUND IMAGING | Facility: CLINIC | Age: 35
Discharge: HOME | End: 2025-04-08
Payer: COMMERCIAL

## 2025-04-08 ENCOUNTER — RESULTS FOLLOW-UP (OUTPATIENT)
Dept: SURGICAL ONCOLOGY | Facility: CLINIC | Age: 35
End: 2025-04-08

## 2025-04-08 VITALS — BODY MASS INDEX: 43.06 KG/M2 | HEIGHT: 62 IN | WEIGHT: 234 LBS

## 2025-04-08 DIAGNOSIS — Z15.09 MONOALLELIC MUTATION OF CHEK2 GENE IN FEMALE PATIENT: ICD-10-CM

## 2025-04-08 DIAGNOSIS — Z15.01 MONOALLELIC MUTATION OF CHEK2 GENE IN FEMALE PATIENT: ICD-10-CM

## 2025-04-08 DIAGNOSIS — Z91.89 AT HIGH RISK FOR BREAST CANCER: ICD-10-CM

## 2025-04-08 DIAGNOSIS — Z15.89 MONOALLELIC MUTATION OF CHEK2 GENE IN FEMALE PATIENT: ICD-10-CM

## 2025-04-08 DIAGNOSIS — Z80.3 FAMILY HISTORY OF BREAST CANCER: ICD-10-CM

## 2025-04-08 DIAGNOSIS — N63.11 MASS OF UPPER OUTER QUADRANT OF RIGHT BREAST: ICD-10-CM

## 2025-04-08 DIAGNOSIS — Z15.02 MONOALLELIC MUTATION OF CHEK2 GENE IN FEMALE PATIENT: ICD-10-CM

## 2025-04-08 PROCEDURE — G0279 TOMOSYNTHESIS, MAMMO: HCPCS

## 2025-04-08 PROCEDURE — 76642 ULTRASOUND BREAST LIMITED: CPT

## 2025-04-08 PROCEDURE — 77066 DX MAMMO INCL CAD BI: CPT

## 2025-04-09 ENCOUNTER — SOCIAL WORK (OUTPATIENT)
Age: 35
End: 2025-04-09

## 2025-04-09 DIAGNOSIS — F32.A ANXIETY AND DEPRESSION: Primary | ICD-10-CM

## 2025-04-09 DIAGNOSIS — F41.9 ANXIETY AND DEPRESSION: Primary | ICD-10-CM

## 2025-04-09 PROCEDURE — 90834 PSYTX W PT 45 MINUTES: CPT

## 2025-04-09 NOTE — PSYCH
"Behavioral Health Psychotherapy Progress Note    Psychotherapy Provided: Individual Psychotherapy     1. Anxiety and depression            Goals addressed in session: Goal 1     DATA: Continued to work with patient so she doesn't become easily overwhelmed by her emotions and to lessen the negative reactions to the unpleasant feelings and situations. Continued to work with patient to help her understand how her thoughts contribute to her anxiety symptoms and learn to change those thought patterns. Continued to work with patient to develop concrete skills and techniques to cope with her anxiety.    During this session, this clinician used the following therapeutic modalities: Cognitive Behavioral Therapy, Mindfulness-based Strategies, and Supportive Psychotherapy    Substance Abuse was not addressed during this session. If the client is diagnosed with a co-occurring substance use disorder, please indicate any changes in the frequency or amount of use: n/a. Stage of change for addressing substance use diagnoses: No substance use/Not applicable    ASSESSMENT:  Rachael Petersen presents with a Euthymic/ normal mood.     her affect is Normal range and intensity, which is congruent, with her mood and the content of the session. The client has made progress on their goals.     Rachael Petersen presents with a none risk of suicide, none risk of self-harm, and none risk of harm to others.    For any risk assessment that surpasses a \"low\" rating, a safety plan must be developed.    A safety plan was indicated: no  If yes, describe in detail n/a    PLAN: Between sessions, Rachael Petersen will continue to use coping strategies discussed to manage anxiety and depression. At the next session, the therapist will use Cognitive Behavioral Therapy, Mindfulness-based Strategies, and Supportive Psychotherapy to address barriers, redefine strategies for sustainable change and review progress since last session.       Behavioral " Health Treatment Plan and Discharge Planning: Rachael Petersen is aware of and agrees to continue to work on their treatment plan. They have identified and are working toward their discharge goals. yes    Depression Follow-up Plan Completed: Not applicable    Visit start and stop times:    04/09/25  Start Time: 1600  Stop Time: 1652  Total Visit Time: 52 minutes

## 2025-04-22 ENCOUNTER — TELEPHONE (OUTPATIENT)
Age: 35
End: 2025-04-22

## 2025-04-22 DIAGNOSIS — G47.33 OSA (OBSTRUCTIVE SLEEP APNEA): Primary | ICD-10-CM

## 2025-04-22 DIAGNOSIS — E66.01 MORBID OBESITY WITH BMI OF 40.0-44.9, ADULT (HCC): ICD-10-CM

## 2025-04-22 RX ORDER — TIRZEPATIDE 5 MG/.5ML
5 INJECTION, SOLUTION SUBCUTANEOUS WEEKLY
Qty: 2 ML | Refills: 1 | Status: SHIPPED | OUTPATIENT
Start: 2025-04-22 | End: 2025-06-17

## 2025-04-22 NOTE — TELEPHONE ENCOUNTER
Patient called rx refill line inquiring a refill on Zepbound. Previously prescribed 2.5mg.     How are you tolerating the medication?   [] Nausea  [] Vomiting  [] Diarrhea  [x] Asymptomatic  [] Other:    Last visit weight: 235 lbs  Current weight: 219 lbs  Date of last injection: 4/16/2025  How many injections do you have left: 0    Please send to [id number].

## 2025-04-23 NOTE — TELEPHONE ENCOUNTER
Pharmacy calling to clarify Zepbound 2.5mg vs Zepbound 5mg.    Advised that 5mg order was submitted yesterday. Patient no longer taking 2.5mg. Titrated up.  Pharmacy will cancel 2.5mg refill request. Zepbound 5mg dispensed.

## 2025-04-24 ENCOUNTER — CLINICAL SUPPORT (OUTPATIENT)
Dept: BARIATRICS | Facility: CLINIC | Age: 35
End: 2025-04-24

## 2025-04-24 DIAGNOSIS — R63.5 ABNORMAL WEIGHT GAIN: Primary | ICD-10-CM

## 2025-04-24 PROCEDURE — WMDI30: Performed by: DIETITIAN, REGISTERED

## 2025-04-24 PROCEDURE — RECHECK: Performed by: DIETITIAN, REGISTERED

## 2025-04-24 NOTE — PROGRESS NOTES
Weight Management Medical Nutrition Assessment  Patient presents for menu planning session. Current weight is 224.2#- 12.6# loss x 4 months. On Zepbound 5 mg. Noticing earlier satiety and eating half as much at meals. Feels her difficulties with diet stem from challenges at home (autistic son who is picky with his food choice).  Patient is concerned about her protein intakes. She has been logging but inconsistently. Discuss opportunities to increase protein intakes (breakfast when not doing the shake, mid morning and after dinner snack)     Updates on Goals from last session:  Try yoga poses 2 days a week- Monday and Wednesday 7am 1 day a week, recently hired a  to help keep her accountable  Consume breakfast at least 5 days a week (include 1 weekend day in the 5 days) eating around 8:30, getting breakfast on both weekend days  Iced tea on Fridays and Tuesdays; on non iced-tea days include 8 additional ounces of water no iced in 3 weeks, replacing with vitamin water    Patient seen by Medical Provider in past 6 months:  yes 12/31/24 Charlie Cheney [facility] provider   Requested to schedule appointment with Medical Provider: Yes 5/28/25 Dr. Tapia    Anthropometric Measurements  Start Weight (#) & Date: 236.8 (12/31/24 [facility] provider)  Current Weight (#):224.2  TBW % Change from start weight:5  Ideal Body Weight (#):110  Goal Weight (#):150  Highest:245 (Nov 2024)  Lowest:    Weight Loss History  Previous weight loss attempts: Diet and Exercise and Physician Supervised Weight Loss Program. Worked with RD prior and they wanted to her do 1000 calorie diet .  Pil    Food and Nutrition Related History  Wake up: 6a on work days, 7:30-8a   Bed Time:10:30a  Occupation:more desk work,  in school district  Sleep:6 hours of sleep, recent dx of SHAUNA, still getting used to the Cpap, also deals with insomnia (on trazadone to help her fall asleep)    Food Recall -updates in bold  8am abby rodriguez  cups every other day, cold brew coffee with muscle milk as creamer  or banana or protein shake instead of coffee  10:30a granola bar or muffin bar by [place] none not hungry  12p L:leftovers or turkey hoagie ( about 2 x  week, 6 inch, cheese light amaro, sweet peppers)  Home from work 3:45p   4p yoplait protein yogurt  5-5:30p D: protein, starch , vegetable (susage with mashed potatoes and mixed vegetables)  -wife portions out the plates and give large portions, does not always finished  Being more mindful over her portion intakes  Wife is also using leaner cooking methods (baking versus frying)  S:Frozen fruit bars but not every night     Beverages: 80-100oz water increasing started using cirkul water bottle, iced coffee  Alcohol: rare  Volume of beverage intake: see above    Weekends: Worse, hoem with her family more which can lead to more intakes  improving, eating breakfast more often  Cravings: ice cream   Trouble area of day:after dinner once her son is in bed    Frequency of Eating out: 1 x week outside work, 2 x week takeout hoagie 1x every 2 weeks at work, not eating out in general at home  Food restrictions:none  Cooking: wife  Food Shopping: wife  -adoptive son is autistic    Physical Activity Intake  Activity:doing pilTalent Flush bar about 15 min a day recently started working with a   -more walking with the nicer weather  Frequency:daily  Physical limitations/barriers to exercise: some knee pain    Estimated Needs  Energy  SECA: BMR:n/a      X 1.3 -1000 =  [place] [facility] Energy Needs: BMR : 1665   1-2# loss weekly sedentary:  [id number]            1-2# loss weekly lightly active:1759-5877  Maintenance calories for sedentary activity level: 1998  Protein:60-75g      (1.2-1.5g/kg IBW)  Fluid: 83oz     ([place]-Segar method)    Nutrition Diagnosis  Yes;    Overweight/obesity  related to Excess energy intake as evidenced by  BMI more than normative standard for age and sex (obesity-grade III  40+)       Nutrition Intervention    Nutrition Prescription  Calories:3661-1454  Protein:75-85g  Fluid:64oz    Meal Plan (Boogie/Pro/Carb)  Breakfast:200-300/15-20  Snack:100-150/5-10  Lunch:400-500/25-40  Snack:100-150/5-10  Dinner:400-500/25-40  Snack:100-150/5-10    Nutrition Education:    Calorie controlled menu  Lean protein food choices  Healthy snack options  Food journaling tips      Nutrition Counseling:  Strategies: meal planning, portion sizes, healthy snack choices, hydration, fiber intake, protein intake, exercise, food journal      Monitoring and Evaluation:  Evaluation criteria:  Energy Intake  Meet protein needs  Maintain adequate hydration  Monitor weekly weight  Meal planning/preparation  Food journal   Decreased portions at mealtimes and snacks  Physical activity     Barriers to learning:none  Readiness to change: Action:  (Changing behavior)  Comprehension: very good  Expected Compliance: good

## 2025-05-21 ENCOUNTER — SOCIAL WORK (OUTPATIENT)
Age: 35
End: 2025-05-21

## 2025-05-21 DIAGNOSIS — F41.9 ANXIETY AND DEPRESSION: Primary | ICD-10-CM

## 2025-05-21 DIAGNOSIS — F32.A ANXIETY AND DEPRESSION: Primary | ICD-10-CM

## 2025-05-21 PROCEDURE — 90834 PSYTX W PT 45 MINUTES: CPT

## 2025-05-21 NOTE — BH CRISIS PLAN
Client Name: Rachael Petersen       Client YOB: 1990    [place] Safety Plan    Creation Date: 5/22/24 Update Date: 5/21/26   Created By: Sue Cardenas LCSW       Step 1: Warning Signs:   Warning Signs   Panic feeling   Angry / Irritated   Throat tightening   Rapid heartbeat            Step 2: Internal Coping Strategies:   Internal Coping Strategies   Intentional deep breaths   Close eyes /visualize   8D audio   Calm Harm veto   Clear Fear veto            Step 3: People and social settings that provide distraction:   Name Contact Information   Iwona (friend) [phone]   Prosper Kinsey (mom) [phone]          Step 4: People whom I can ask for help during a crisis:    Name Contact Information    Iwona [phone]    Prosper Kinsey [phone]      Step 5: Professionals or agencies I can contact during a crisis:    Clinican/Agency Name Phone Emergency Contact    [facility] Crisis Line 988     [facility] [phone]     [facility] Emergency Department Emergency Department Phone Emergency Department Address    [facility] [phone] [address] [place] [address]      Crisis Phone Numbers:   Suicide Prevention Lifeline: Call or Text  988 Crisis Text Line: Text HOME to 327-708   Please note: Some [facility] do not have a separate number for Child/Adolescent specific crisis. If your county is not listed under Child/Adolescent, please call the adult number for your county      Adult Crisis Numbers: Child/Adolescent Crisis Numbers   [facility]: [phone] [facility]: [phone]   [facility]: [phone] [facility]: [phone]   [facility]: [phone] [place]: [phone]   [facility]: [phone] [place] [facility]: [phone]   [place] [facility]: [phone]   [facility]: [phone]   [facility]: [phone]   [facility]: [phone] (daytime) [phone] (after  hours, weekends, holidays)      Step 6: Making the environment safer (plan for lethal means safety):   Plan: Medications . Have less in the household (only what is necessary)     Optional: What is most important to me and worth living for?   My family     Jose Juan Safety Plan. Meseret Camacho and Juvenal Hidalgo. Used with permission of the authors.

## 2025-05-21 NOTE — BH TREATMENT PLAN
Outpatient Behavioral Health Psychotherapy Treatment Plan    Rachael Petersen  1990     Date of Initial Psychotherapy Assessment: 10/25/23   Date of Current Treatment Plan: 05/21/25  Treatment Plan Target Date: 11/21/25  Treatment Plan Expiration Date: 11/25/21    Diagnosis:   1. Anxiety and depression            Area(s) of Need: Coping strategies, managing anxiety and depression, self-esteem/worth    Long Term Goal 1 (in the client's own words): Develop and maintain a consistent sense of emotional well-being and self-worth by reducing symptoms of anxiety and depression, increasing self-compassion, and building adaptive coping strategies and confidence in managing life challenges.     Stage of Change: Action    Target Date for completion: n/a     Anticipated therapeutic modalities: Cognitive behavioral therapy, supportive psychotherapy, mindfulness/relaxation practices        People identified to complete this goal: Patient, therapist      Objective 1: (identify the means of measuring success in meeting the objective): Patient will be able to demonstrate at least 65% reduction in anxiety as well as improve mood by 50%, with the use of at least 2-3 learned healthier methods of coping each week and continued talk-therapy, as self-identified and reported          Objective 2: (identify the means of measuring success in meeting the objective): Patient will improve self-esteem to positively impact the way she feels, thinks and behaves by identifying self-esteem issues and work through them by learning at least 3-4 new, more helpful ways of thinking and responding to her emotional needs and making choices that make her feel better about themselves, instead of worse,  as self-identified and reported       Long Term Goal 2 (in the client's own words): n/a    Stage of Change: n/a    Target Date for completion: n/a     Anticipated therapeutic modalities: n/a     People identified to complete this goal:  n/a      Objective 1: (identify the means of measuring success in meeting the objective): n/a      Objective 2: (identify the means of measuring success in meeting the objective): n/a     Long Term Goal 3 (in the client's own words): n/a     I am currently under the care of a [facility] psychiatric provider: no    My [facility] psychiatric provider is: n/a    I am currently taking psychiatric medications: Yes, as prescribed    I feel that I will be ready for discharge from mental health care when I reach the following (measurable goal/objective): Goals met    For children and adults who have a legal guardian:   Has there been any change to custody orders and/or guardianship status? N/A. If yes, attach updated documentation.    I have updated my Crisis Plan and have been offered a copy of this plan    Behavioral Health Treatment Plan [facility]: Diagnosis and Treatment Plan explained to Rachael Petersen acknowledges an understanding of their diagnosis. Rachael Petersen agrees to this treatment plan.    I have been offered a copy of this Treatment Plan. yes

## 2025-05-21 NOTE — PSYCH
"Behavioral Health Psychotherapy Progress Note    Psychotherapy Provided: Individual Psychotherapy     1. Anxiety and depression            Goals addressed in session: Goal 1     DATA: Worked with patient to understand, identify and work through her triggers and how she can address them before, during and after a situation where she may be triggered. Worked with patient how she can focus on regulating her reaction to a stressor to help reduce the impact and then be able to reality-check her thoughts.    During this session, this clinician used the following therapeutic modalities: Cognitive Behavioral Therapy, Mindfulness-based Strategies, and Supportive Psychotherapy    Substance Abuse was not addressed during this session. If the client is diagnosed with a co-occurring substance use disorder, please indicate any changes in the frequency or amount of use: n/a. Stage of change for addressing substance use diagnoses: No substance use/Not applicable    ASSESSMENT:  Rachael Petersen presents with a Euthymic/ normal mood.     her affect is Normal range and intensity, which is congruent, with her mood and the content of the session. The client has made progress on their goals.     Rachael Petersen presents with a none risk of suicide, none risk of self-harm, and none risk of harm to others.    For any risk assessment that surpasses a \"low\" rating, a safety plan must be developed.    A safety plan was indicated: no  If yes, describe in detail n/a    PLAN: Between sessions, Rachael Petresen will continue to use coping strategies discussed to manage anxiety and depression.    At the next session, the therapist will use Cognitive Behavioral Therapy, Mindfulness-based Strategies, and Supportive Psychotherapy to address barriers, redefine strategies for sustainable change and review progress since last session.       Behavioral Health Treatment Plan and Discharge Planning: Rachael Petersen is aware of and agrees to continue " to work on their treatment plan. They have identified and are working toward their discharge goals. yes    Depression Follow-up Plan Completed: Not applicable    Visit start and stop times:    05/21/25  Start Time: 1603  Stop Time: 1655  Total Visit Time: 52 minutes

## 2025-05-24 ENCOUNTER — OFFICE VISIT (OUTPATIENT)
Age: 35
End: 2025-05-24
Payer: COMMERCIAL

## 2025-05-24 VITALS
RESPIRATION RATE: 16 BRPM | DIASTOLIC BLOOD PRESSURE: 68 MMHG | HEART RATE: 86 BPM | TEMPERATURE: 96.8 F | WEIGHT: 216 LBS | HEIGHT: 62 IN | OXYGEN SATURATION: 97 % | SYSTOLIC BLOOD PRESSURE: 90 MMHG | BODY MASS INDEX: 39.75 KG/M2

## 2025-05-24 DIAGNOSIS — L73.9 FOLLICULITIS: Primary | ICD-10-CM

## 2025-05-24 PROCEDURE — S9083 URGENT CARE CENTER GLOBAL: HCPCS

## 2025-05-24 PROCEDURE — G0382 LEV 3 HOSP TYPE B ED VISIT: HCPCS

## 2025-05-24 RX ORDER — CEPHALEXIN 500 MG/1
500 CAPSULE ORAL EVERY 8 HOURS SCHEDULED
Qty: 21 CAPSULE | Refills: 0 | Status: SHIPPED | OUTPATIENT
Start: 2025-05-24 | End: 2025-05-31

## 2025-05-24 RX ORDER — MUPIROCIN 20 MG/G
OINTMENT TOPICAL 3 TIMES DAILY
Qty: 15 G | Refills: 0 | Status: SHIPPED | OUTPATIENT
Start: 2025-05-24

## 2025-05-24 NOTE — PATIENT INSTRUCTIONS
Thank you for trusting your health with [facility].    Please review the following instructions and return to our clinic or consider an Emergency Department visit for worsening or severe symptoms.      Instructions:   Warm baths to bring things to the surface.  Dry undergarments rest of the day.   Review ingrown hair handout.  Apply ointment 2 times a day for 1 week.  Take antibiotic until it is completed.

## 2025-05-24 NOTE — PROGRESS NOTES
"Name: Rachael Petersen      : 1990      MRN: [id number]  Encounter Provider: Ledy Wilson PA-C  Encounter Date: 2025   Encounter department: [facility]  :  Assessment & Plan  Folliculitis    Orders:    cephalexin (KEFLEX) 500 mg capsule; Take 1 capsule (500 mg total) by mouth every 8 (eight) hours for 7 days    mupirocin (BACTROBAN) 2 % ointment; Apply topically 3 (three) times a day    Oral abx given for concern for folliculitis but also given mupirocin to apply BID to areas of greatest irritation and redness. Reviewed skin care and conservative management to prevent ingrowns. PT knows to f/u with gynecology for any persistent symptoms.    History of Present Illness   HPI  Rachael Petersen is a 34 y.o. female who presents for concerns for an ingrown hair/skin infection.        Review of Systems   Skin:  Positive for rash.          Objective   BP 90/68 (BP Location: Left arm)   Pulse 86   Temp (!) 96.8 °F (36 °C) (Tympanic)   Resp 16   Ht 5' 2\" (1.575 m)   Wt 98 kg (216 lb)   LMP 2025 (Approximate)   SpO2 97%   BMI 39.51 kg/m²      Physical Exam  Constitutional:       Appearance: Normal appearance.   HENT:      Head: Normocephalic and atraumatic.     Cardiovascular:      Rate and Rhythm: Normal rate.   Pulmonary:      Effort: Pulmonary effort is normal.   Genitourinary:     Comments: Small area of redness and folliculitis along the external right labia with  less red but still present folliculitis on the left external labia. No other concerning lesions or concern for abscess development.    Neurological:      Mental Status: She is alert.           "

## 2025-05-28 ENCOUNTER — OFFICE VISIT (OUTPATIENT)
Dept: BARIATRICS | Facility: CLINIC | Age: 35
End: 2025-05-28
Payer: COMMERCIAL

## 2025-05-28 VITALS
HEIGHT: 62 IN | BODY MASS INDEX: 39.79 KG/M2 | DIASTOLIC BLOOD PRESSURE: 74 MMHG | OXYGEN SATURATION: 99 % | WEIGHT: 216.2 LBS | SYSTOLIC BLOOD PRESSURE: 120 MMHG | HEART RATE: 70 BPM | TEMPERATURE: 98.3 F

## 2025-05-28 DIAGNOSIS — E66.812 CLASS 2 OBESITY DUE TO EXCESS CALORIES WITH BODY MASS INDEX (BMI) OF 38.0 TO 38.9 IN ADULT, UNSPECIFIED WHETHER SERIOUS COMORBIDITY PRESENT: Primary | ICD-10-CM

## 2025-05-28 DIAGNOSIS — E66.01 MORBID OBESITY WITH BMI OF 40.0-44.9, ADULT (HCC): ICD-10-CM

## 2025-05-28 DIAGNOSIS — E66.09 CLASS 2 OBESITY DUE TO EXCESS CALORIES WITH BODY MASS INDEX (BMI) OF 38.0 TO 38.9 IN ADULT, UNSPECIFIED WHETHER SERIOUS COMORBIDITY PRESENT: Primary | ICD-10-CM

## 2025-05-28 DIAGNOSIS — G47.33 OSA (OBSTRUCTIVE SLEEP APNEA): ICD-10-CM

## 2025-05-28 PROCEDURE — 99214 OFFICE O/P EST MOD 30 MIN: CPT | Performed by: STUDENT IN AN ORGANIZED HEALTH CARE EDUCATION/TRAINING PROGRAM

## 2025-05-28 RX ORDER — TIRZEPATIDE 5 MG/.5ML
5 INJECTION, SOLUTION SUBCUTANEOUS WEEKLY
Qty: 2 ML | Refills: 4 | Status: SHIPPED | OUTPATIENT
Start: 2025-05-28

## 2025-05-28 NOTE — PROGRESS NOTES
Assessment & Plan  Class 2 obesity    Initial weight: 236  Current weight:216     TBW loss%: 8     Medication: Continue Zepbound 5mg     Patient understands the importance of making lifestyle changes as recommended below to aid in weight loss.  Follows with RD     Dietary Recommendations:  Recommend to avoid skipping any meals. Adequate amount of Macronutrients (minimal 3 meals a day) is necessary to help improve metabolism, satiety and allow for better portion control by decreasing Ghrelin (hunger hormone). Lack of hunger can be suppressed by a hormone called Leptin (full hormone) which can occur from a previous meal or caffeine intake    Protein intake throughout the day can help promote satiety and is necessary for muscle growth/repair    Carbohydrates are essential as it is the vital source of fuel for daily activities. energy, cell function, nutrient absorption, and hormone production.     Fats: Essential vitamins like A, D, and E, support cell growth, function and are necessary for nutrient absorption to support your organs     Fluid intake which is at least half your body weight in ounces is necessary to help control cravings (decreasing confusion for appetite vs water deprivation) as the human body is made up of 50-70% of Fluids. If there is a diversion for water alone, would recommend flavored water (example-splash of lemonade or ice tea) to help promote compliance. Fluids include Teas, water, flavored water, seltzer water, coffee, shakes    Metabolism:    Metabolism can also be promoted by macronutrient intake and increased muscle weight via thermogenesis      Daily Calorie Needs: Recommend to take into account any fluid losses and calories burned via increased activity levels as daily calories may need to be adjusted     Weight check: Weights can fluctuate depending on fluid shifts vs what foods are consumed prior to checking your weight          Return in about 3 months (around 9/8/2025) for followup .  "    Most recent notes, labs and previous medical records were reviewed. Total time with chart review and with the patient: 35 min      ______________________________________________________________________        Subjective:     Chief Complaint   Patient presents with    Follow-up     MWM 3 month follow-up; waist: 41.3\"       HPI: 34 y.o. female with pmh of  SHAUNA, GERD presents for follow-up    Wt Loss History:     Medication: Zepbound 5mg    Onset:  last 5 years    Modifiers: Diet and Exercise and Physician Supervised Weight Loss Program. Worked with FABIEN saxena and they wanted to her do 1000 calorie diet .  Pill        Hydration:48-64 oz water, iced cofe, 1-2 glasses of ice tea  Exercise:doing pilates bar about 15 min a day    Sleep:6 hours of sleep     Diet Recall  L:leftovers or turkey hoagie   D: protein, starch , vegetable    Review Of Systems:  General: No pallor  Pulmonary: Negative for shortness of breath  Chest: negative for chest pain  Gastrointestinal:  Negative for vomiting   Psychiatric/Behavioral:  Negative for behavioral problems, confusion, dysphoric mood and hallucinations.    All other systems reviewed and are negative.     Objective:  /74 (BP Location: Left arm, Patient Position: Sitting, Cuff Size: Large)   Pulse 70   Temp 98.3 °F (36.8 °C) (Tympanic)   Ht 5' 2\" (1.575 m)   Wt 98.1 kg (216 lb 3.2 oz)   LMP 01/01/2025 (Approximate)   SpO2 99%   BMI 39.54 kg/m²     Wt Readings from Last 30 Encounters:   05/28/25 98.1 kg (216 lb 3.2 oz)   05/24/25 98 kg (216 lb)   04/08/25 106 kg (234 lb)   02/11/25 106 kg (234 lb)   02/10/25 106 kg (233 lb)   12/31/24 107 kg (236 lb 12.8 oz)   11/12/24 110 kg (243 lb 9.6 oz)   09/11/24 109 kg (240 lb)   08/21/24 104 kg (230 lb)   08/05/24 108 kg (238 lb)   07/10/24 107 kg (235 lb 12.8 oz)   06/26/24 109 kg (240 lb)   04/24/24 108 kg (238 lb)   02/05/24 108 kg (238 lb)   01/29/24 107 kg (235 lb)   01/17/24 106 kg (234 lb)   09/25/23 105 kg (232 lb) "   09/21/23 105 kg (232 lb 6.4 oz)   08/30/23 105 kg (231 lb 7.7 oz)   08/24/23 105 kg (230 lb 6.4 oz)   08/15/23 104 kg (228 lb 3.2 oz)   08/04/23 105 kg (231 lb)   08/02/23 104 kg (229 lb 3.2 oz)   07/27/23 106 kg (234 lb 6.4 oz)   07/06/23 104 kg (229 lb)   04/20/23 103 kg (227 lb 12.8 oz)   02/06/23 104 kg (230 lb 3.2 oz)   02/03/23 104 kg (229 lb)   01/19/23 104 kg (229 lb)   01/06/23 102 kg (225 lb)       Physical Exam  Constitutional:       General: No acute distress.  Well-nourished  HENT:      Head: Normocephalic and atraumatic.   Eyes:      Extraocular Movements: Extraocular movements intact.      Conjunctiva/pupils: Conjunctivae normal. Pupils are equal, round  Pulmonary:      Effort: Pulmonary effort is normal. No labored breathing   Neurological:      General: No focal deficit present.  AO x 3     Mental Status: Alert and oriented to person, place, and time. Mental status is at baseline.   Psychiatric:         Mood and Affect: Mood normal.         Behavior: Behavior normal.     Labs and Imaging  Recent labs and imaging have been personally reviewed.

## 2025-06-04 ENCOUNTER — SOCIAL WORK (OUTPATIENT)
Age: 35
End: 2025-06-04

## 2025-06-04 DIAGNOSIS — F32.A ANXIETY AND DEPRESSION: Primary | ICD-10-CM

## 2025-06-04 DIAGNOSIS — F41.9 ANXIETY AND DEPRESSION: Primary | ICD-10-CM

## 2025-06-04 PROCEDURE — 90834 PSYTX W PT 45 MINUTES: CPT

## 2025-06-04 NOTE — PSYCH
"Behavioral Health Psychotherapy Progress Note    Psychotherapy Provided: Individual Psychotherapy     1. Anxiety and depression            Goals addressed in session: Goal 1     DATA: Worked with patient to continue reducing the frequency, intensity and duration of her anxiety. Worked with patient to continue resolving the core conflicts that are the source of her anxiety. Continued to work with patient to enhance her ability to effectively cope with a variety of life's anxieties. Provided emotional support and validation. Encouraged adaptive coping skills. Explored cognitive distortions and reframed negative thoughts. Reinforced strengths and resilience. Assisted in identifying and expressing emotions. Reviewed progress on therapeutic goals.      During this session, this clinician used the following therapeutic modalities: Cognitive Behavioral Therapy, Mindfulness-based Strategies, and Supportive Psychotherapy    Substance Abuse was not addressed during this session. If the client is diagnosed with a co-occurring substance use disorder, please indicate any changes in the frequency or amount of use: n/a. Stage of change for addressing substance use diagnoses: No substance use/Not applicable    ASSESSMENT:  Rachael Petersen presents with a Euthymic/ normal mood.     her affect is Normal range and intensity, which is congruent, with her mood and the content of the session. The client has made progress on their goals.     Rachael Petersen presents with a none risk of suicide, none risk of self-harm, and none risk of harm to others.    For any risk assessment that surpasses a \"low\" rating, a safety plan must be developed.    A safety plan was indicated: no  If yes, describe in detail n/a    PLAN: Between sessions, Rachael Petersen will continue to use coping strategies discussed to manage anxiety and depression. At the next session, the therapist will use Cognitive Behavioral Therapy, Mindfulness-based Strategies, " and Supportive Psychotherapy to address barriers, redefine strategies for sustainable change and review progress since last session.       Behavioral Health Treatment Plan and Discharge Planning: Rachael Petersen is aware of and agrees to continue to work on their treatment plan. They have identified and are working toward their discharge goals. yes    Depression Follow-up Plan Completed: Not applicable    Visit start and stop times:    06/04/25  Start Time: 1610  Stop Time: 1700  Total Visit Time: 50 minutes

## 2025-06-20 ENCOUNTER — TELEPHONE (OUTPATIENT)
Dept: FAMILY MEDICINE CLINIC | Facility: CLINIC | Age: 35
End: 2025-06-20

## 2025-06-26 ENCOUNTER — CLINICAL SUPPORT (OUTPATIENT)
Dept: BARIATRICS | Facility: CLINIC | Age: 35
End: 2025-06-26

## 2025-06-26 DIAGNOSIS — R63.5 ABNORMAL WEIGHT GAIN: Primary | ICD-10-CM

## 2025-06-26 PROCEDURE — RECHECK: Performed by: DIETITIAN, REGISTERED

## 2025-06-26 PROCEDURE — WMDI30: Performed by: DIETITIAN, REGISTERED

## 2025-06-26 NOTE — PROGRESS NOTES
Weight Management Medical Nutrition Assessment  Patient presents for menu planning session. Current weight is 208.2#- 28.7# loss x 6 months. On Zepbound 5 mg-states that she is struggling with no appetite and is finding it hard to eat in her day. Encouraged patient to reach out to provider about medication dosage. State that her insurance is changing soon and may loose coverage.      Session spent helping patient to set up a smaller more frequent meal plan.     Patient seen by Medical Provider in past 6 months:  yes 12/31/24 Charlie Cheney [facility] provider   Requested to schedule appointment with Medical Provider: Yes 5/28/25 Dr. Tapia    Anthropometric Measurements  Start Weight (#) & Date: 236.8 (12/31/24 [facility] provider)  Current Weight (#):208.2  TBW % Change from start weight:12  Ideal Body Weight (#):110  Goal Weight (#):150  Highest:245 (Nov 2024)  Lowest:    Weight Loss History  Previous weight loss attempts: Diet and Exercise and Physician Supervised Weight Loss Program. Worked with RD  and they wanted to her do 1000 calorie diet .  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, 3 days from home off Fridays for summer  Sleep:6 hours of sleep, recent dx of SHAUNA, still getting used to the Cpap, also deals with insomnia (on trazadone to help her fall asleep)    Food Recall   8am drinking cranberry juice or protein shake (premier and quest pre made)  -appetite suppression is strongest in the AM  12p L:leftovers or turkey hoagie ( about 2 x  week, 6 inch, cheese light amaro, sweet peppers)  5-5:30p D: protein, starch , vegetable (susage with mashed potatoes and mixed vegetables)     Beverages: 80-100oz water increasing started using cirkul water bottle, iced coffee  Alcohol: rare  Volume of beverage intake: see above    Weekends: same  Cravings: ice  cream   Trouble area of day:after dinner once her son is in bed    Frequency of Eating out: when first started dating 1-2 times a week, last few weeks none  Food restrictions:none  Cooking: self  Food Shopping: self  -adoptive son is autistic  -going through a divorce, does have a GF    Physical Activity Intake  Activity:continues to work with  which is a positive  -weights 3 times a week  -yoga or [place]  -5 days a week  Frequency:daily  Physical limitations/barriers to exercise: some knee pain    Estimated Needs  Energy  SECA: BMR:n/a      X 1.3 -1000 =  [place] [facility] Energy Needs: BMR : 1593   1-2# loss weekly sedentary:  991-1411       1-2# loss weekly lightly active:8575-5641  Maintenance calories for sedentary activity level: 1998  Protein:60-75g      (1.2-1.5g/kg IBW)  Fluid: 83oz     ([place]-Segar method)    Nutrition Diagnosis  Yes;    Overweight/obesity  related to Excess energy intake as evidenced by  BMI more than normative standard for age and sex (obesity-grade III 40+)       Nutrition Intervention    Nutrition Prescription  Calories:2339-5340  Protein:75-85g  Fluid:64oz    Meal Plan (Boogie/Pro/Carb)  Breakfast:200-300/15-20  Snack:100-150/5-10  Lunch:400-500/25-40  Snack:100-150/5-10  Dinner:400-500/25-40  Snack:100-150/5-10    Nutrition Education:    Calorie controlled menu  Lean protein food choices  Healthy snack options  Food journaling tips      Nutrition Counseling:  Strategies: meal planning, portion sizes, healthy snack choices, hydration, fiber intake, protein intake, exercise, food journal      Monitoring and Evaluation:  Evaluation criteria:  Energy Intake  Meet protein needs  Maintain adequate hydration  Monitor weekly weight  Meal planning/preparation  Food journal   Decreased portions at mealtimes and snacks  Physical activity     Barriers to learning:none  Readiness to change: Action:  (Changing behavior)  Comprehension: very good  Expected Compliance: good

## 2025-07-03 ENCOUNTER — TELEMEDICINE (OUTPATIENT)
Age: 35
End: 2025-07-03

## 2025-07-03 DIAGNOSIS — F41.9 ANXIETY AND DEPRESSION: Primary | ICD-10-CM

## 2025-07-03 DIAGNOSIS — F32.A ANXIETY AND DEPRESSION: Primary | ICD-10-CM

## 2025-07-03 PROCEDURE — 90837 PSYTX W PT 60 MINUTES: CPT

## 2025-07-03 NOTE — PSYCH
"Virtual Regular VisitName: Rachael Petersen      : 1990      MRN: [id number]  Encounter Provider: Sue Cardenas LCSW  Encounter Date: 7/3/2025   Encounter [facility]: [facility]  :  Assessment & Plan  Anxiety and depression             Goals addressed in session: Goal 1     DATA: Provided emotional support and validation. Encouraged adaptive coping skills. Explored cognitive distortions and reframed negative thoughts. Reinforced strengths and resilience. Assisted in identifying and expressing emotions. Reviewed progress on therapeutic goals.    During this session, this clinician used the following therapeutic modalities: Cognitive Behavioral Therapy, Mindfulness-based Strategies, and Supportive Psychotherapy    Substance Abuse was not addressed during this session. If the client is diagnosed with a co-occurring substance use disorder, please indicate any changes in the frequency or amount of use: n/a. Stage of change for addressing substance use diagnoses: No substance use/Not applicable    ASSESSMENT:  Rachael presents with a Euthymic/ normal mood. Rachael's affect is Normal range and intensity, which is congruent, with their mood and the content of the session. The client has made progress on their goals as evidenced by as evidenced by using coping strategies to reduce the frequency, intensity and duration of anxiety and depression symptoms as evidenced by self-report and clinician observation.    Rachael presents with a none risk of suicide, none risk of self-harm, and none risk of harm to others.    For any risk assessment that surpasses a \"low\" rating, a safety plan must be developed.    A safety plan was indicated: no  If yes, describe in detail n/a    PLAN: Between sessions, Rachael will continue to use coping strategies discussed to manage anxiety and depression. At the next session, the therapist will use Cognitive Behavioral Therapy, " Mindfulness-based Strategies, and Supportive Psychotherapy to address barriers, redefine strategies for sustainable change and review progress since last session.       Behavioral Health Treatment Plan [facility]: Diagnosis and Treatment Plan explained to Rachael Cano relates understanding diagnosis and is agreeable to Treatment Plan. Yes     Depression Follow-up Plan Completed: Not applicable     Reason for visit is   Chief Complaint   Patient presents with   • Virtual Regular Visit      Recent Visits  No visits were found meeting these conditions.  Showing recent visits within past 7 days and meeting all other requirements  Today's Visits  Date Type Provider Dept   07/03/25 Telemedicine Sue Cardenas LCSW [facility]   Showing today's visits and meeting all other requirements  Future Appointments  No visits were found meeting these conditions.  Showing future appointments within next 150 days and meeting all other requirements     History of Present Illness     HPI    Past Medical History   Past Medical History[1]  Past Surgical History[2]  Current Outpatient Medications   Medication Instructions   • escitalopram (LEXAPRO) 20 mg, Oral, Daily   • hydrOXYzine HCL (ATARAX) 50 mg tablet TAKE 1 TABLET BY MOUTH 3 TIMES A DAY AS NEEDED FOR ITCHING   • meloxicam (MOBIC) 15 mg, Daily   • mupirocin (BACTROBAN) 2 % ointment Topical, 3 times daily   • Omeprazole 20 MG TBEC Take by mouth   • oxybutynin (DITROPAN) 5 mg, Oral, Every morning   • traZODone (DESYREL) 100 mg, Daily at bedtime   • Zepbound 5 mg, Subcutaneous, Weekly     Allergies[3]    Objective   There were no vitals taken for this visit.    Video Exam  Physical Exam     Administrative Statements   Encounter provider Sue Cardenas LCSW    The Patient is located at Home and in the following state in which I hold an active license [place].    The patient was identified by name and date of birth. Rachael Petersen was informed that this  is a telemedicine visit and that the visit is being conducted through the Epic Embedded platform. She agrees to proceed..  My office door was closed. No one else was in the room.  She acknowledged consent and understanding of privacy and security of the video platform. The patient has agreed to participate and understands they can discontinue the visit at any time.    I have spent a total time of 56 minutes in caring for this patient on the day of the visit/encounter including psychotherapy, not including the time spent for establishing the audio/video connection.    Visit Time  Start Time: 1205  Stop Time: 1301  Total Visit Time: 56 minutes         [1]  Past Medical History:  Diagnosis Date   • Allergic within the last 3 years    Batelena, Ron, seasonal   • Anxiety 2011   • Arthritis    • Bronchitis 09/21/2023   • Depression    • Fall from slipping 08/13/2023   • Head injury 08/13/2023   • Migraines    • Neck strain 08/13/2023   • Obstructive sleep apnea on CPAP 12/08/2024   • Post-concussion syndrome 08/15/2023   • Sleep difficulties    [2]  Past Surgical History:  Procedure Laterality Date   • SHOULDER ARTHROSCOPY Left    • SHOULDER SURGERY     • WISDOM TOOTH EXTRACTION     • WISDOM TOOTH EXTRACTION     [3]  Allergies  Allergen Reactions   • Bupropion Hives   • Sulfamethoxazole-Trimethoprim Hives

## 2025-07-10 DIAGNOSIS — F41.9 ANXIETY AND DEPRESSION: ICD-10-CM

## 2025-07-10 DIAGNOSIS — F32.A ANXIETY AND DEPRESSION: ICD-10-CM

## 2025-07-10 DIAGNOSIS — F51.01 PRIMARY INSOMNIA: ICD-10-CM

## 2025-07-10 RX ORDER — TRAZODONE HYDROCHLORIDE 100 MG/1
100 TABLET ORAL
Qty: 90 TABLET | Refills: 3 | Status: SHIPPED | OUTPATIENT
Start: 2025-07-10

## 2025-07-11 ENCOUNTER — ANNUAL EXAM (OUTPATIENT)
Dept: OBGYN CLINIC | Facility: CLINIC | Age: 35
End: 2025-07-11
Payer: COMMERCIAL

## 2025-07-11 VITALS
BODY MASS INDEX: 37.69 KG/M2 | HEIGHT: 62 IN | DIASTOLIC BLOOD PRESSURE: 70 MMHG | SYSTOLIC BLOOD PRESSURE: 124 MMHG | WEIGHT: 204.8 LBS

## 2025-07-11 DIAGNOSIS — Z15.09 MONOALLELIC MUTATION OF CHEK2 GENE IN FEMALE PATIENT: ICD-10-CM

## 2025-07-11 DIAGNOSIS — Z15.89 MONOALLELIC MUTATION OF CHEK2 GENE IN FEMALE PATIENT: ICD-10-CM

## 2025-07-11 DIAGNOSIS — Z01.419 ENCNTR FOR GYN EXAM (GENERAL) (ROUTINE) W/O ABN FINDINGS: Primary | ICD-10-CM

## 2025-07-11 DIAGNOSIS — Z87.42 HISTORY OF OVARIAN CYST: ICD-10-CM

## 2025-07-11 DIAGNOSIS — Z15.01 MONOALLELIC MUTATION OF CHEK2 GENE IN FEMALE PATIENT: ICD-10-CM

## 2025-07-11 DIAGNOSIS — Z15.02 MONOALLELIC MUTATION OF CHEK2 GENE IN FEMALE PATIENT: ICD-10-CM

## 2025-07-11 DIAGNOSIS — N94.6 DYSMENORRHEA: ICD-10-CM

## 2025-07-11 PROCEDURE — S0612 ANNUAL GYNECOLOGICAL EXAMINA: HCPCS | Performed by: OBSTETRICS & GYNECOLOGY

## 2025-07-11 NOTE — PROGRESS NOTES
Assessment        Diagnoses and all orders for this visit:    Encntr for gyn exam (general) (routine) w/o abn findings    Dysmenorrhea  -     US pelvis complete w transvaginal; Future    Monoallelic mutation of CHEK2 gene in female patient    History of ovarian cyst             Plan      All questions answered.  Contraception: none.  Educational material distributed.  Follow up in 1 year.  Follow up as needed.  Pelvic ultrasound. To evaluate painful periods, h/o ovarian cysts  PAP not needed      Subjective      Rachael Petersen is a 34 y.o. female who presents for annual exam.      Chief Complaint   Patient presents with    Gynecologic Exam     Female partner, new partner  Thinking about starting a family  CHEK 2 variant - based on genetic oncolog   Sees surgical oncology for breast screening/ mammo and MRI    She had been off DEPO, she states first period was heavy and painful  She states that last period was normal.    H/o recurrent ovarian cysts       Last Pap: 07/10/2024  Last mammogram: 2025  Colorectal cancer screening: Cologuard: Not on file Colonoscopy: Not on file   DEXA: Not on file       HPV vaccine completed:no  Current contraception: none  History of abnormal Pap smear: no  History of abnormal mammogram: no  Family history of uterine or ovarian cancer: maternal GGM (unknown age)  Family history of breast cancer: dad's half sister (late 40s)  Dad's 2 cousins (late 40s, early 50s)  2 Paternal great aunts (both 50s)     Family history of colon cancer: no    OB History    Para Term  AB Living   0 0 0 0 0 0   SAB IAB Ectopic Multiple Live Births   0 0 0 0 0   Obstetric Comments   Menarche: 9       Menstrual History:  OB History          0    Para   0    Term   0       0    AB   0    Living   0         SAB   0    IAB   0    Ectopic   0    Multiple   0    Live Births   0           Obstetric Comments   Menarche: 9              Menarche age: 9  No LMP recorded. (Menstrual  "status: Amenorrheic other).       Social History     Substance and Sexual Activity   Sexual Activity Yes    Partners: Female    Birth control/protection: None    Comment: Depo shot          Past Medical History[1]  Past Surgical History[2]  Family History[3]    Social History[4]     Current Medications[5]    Allergies   Allergen Reactions    Bupropion Hives    Sulfamethoxazole-Trimethoprim Hives           Review of Systems   Constitutional: Negative.    HENT: Negative.     Eyes: Negative.    Respiratory: Negative.     Cardiovascular: Negative.    Gastrointestinal: Negative.    Endocrine: Negative.    Genitourinary:         As noted in HPI   Musculoskeletal: Negative.    Skin: Negative.    Allergic/Immunologic: Negative.    Neurological: Negative.    Hematological: Negative.    Psychiatric/Behavioral: Negative.     All other systems reviewed and are negative.      /70 (BP Location: Right arm, Patient Position: Sitting, Cuff Size: Adult)   Ht 5' 2\" (1.575 m)   Wt 92.9 kg (204 lb 12.8 oz)   BMI 37.46 kg/m²         Physical Exam  Constitutional:       Appearance: She is well-developed.   Genitourinary:      Vulva, bladder and rectum normal.      No lesions in the vagina.      Genitourinary Comments:         Right Labia: No rash, tenderness, lesions, skin changes or Bartholin's cyst.     Left Labia: No tenderness, lesions, skin changes, Bartholin's cyst or rash.     No inguinal adenopathy present in the right or left side.     No vaginal discharge, tenderness or bleeding.      No vaginal prolapse present.     No vaginal atrophy present.       Right Adnexa: not tender, not full and no mass present.     Left Adnexa: not tender, not full and no mass present.     No cervical motion tenderness, friability, lesion or polyp.      Uterus is not enlarged or tender.      Pelvic exam was performed with patient in the lithotomy position.   Rectum:      No external hemorrhoid.   Breasts:     Right: No mass, nipple discharge, " skin change or tenderness.      Left: No mass, nipple discharge, skin change or tenderness.   HENT:      Head: Normocephalic.      Nose: Nose normal.     Eyes:      Conjunctiva/sclera: Conjunctivae normal.     Neck:      Thyroid: No thyromegaly.     Cardiovascular:      Rate and Rhythm: Normal rate and regular rhythm.      Heart sounds: Normal heart sounds. No murmur heard.  Pulmonary:      Effort: Pulmonary effort is normal. No respiratory distress.      Breath sounds: Normal breath sounds. No wheezing or rales.   Abdominal:      General: There is no distension.      Palpations: Abdomen is soft. There is no mass.      Tenderness: There is no abdominal tenderness. There is no guarding or rebound.     Musculoskeletal:         General: No tenderness.      Cervical back: Neck supple. No muscular tenderness.   Lymphadenopathy:      Cervical: No cervical adenopathy.      Lower Body: No right inguinal adenopathy. No left inguinal adenopathy.     Neurological:      Mental Status: She is alert and oriented to person, place, and time.     Skin:     General: Skin is warm and dry.     Psychiatric:         Mood and Affect: Mood normal.         Behavior: Behavior normal.   Vitals and nursing note reviewed.             Future Appointments   Date Time Provider Department Center   7/23/2025 11:00 AM Sue Cardenas LCSW PSY [facility]   8/8/2025  1:15 PM OW MRI 1 OW MRI [facility]   8/13/2025  4:00 PM Sue Cardenas LCSW PSY [facility]   9/2/2025  7:40 AM [facility]   9/3/2025  4:00 PM [facility]   9/9/2025  8:00 AM BE US RBC 3 BE RBC US BE RBC   9/17/2025  4:00 PM YAW GonzalezW [facility]   9/24/2025  3:30 PM Roverto Tapia MD [facility]   10/29/2025  4:00 PM Sue Cardenas LCSW PSY [facility]   10/30/2025 10:00 AM Jayla Prajapati RD [facility]   11/12/2025  4:00 PM ALDA Gonzalez  Practice-   12/10/2025  4:00 PM Sue Pilek, LCSW [facility] [place] [facility]-   12/31/2025  4:00 PM Sue Pilek, LCSW [facility] [place] [facility]-   1/14/2026  4:00 PM Sue Pilek, LCSW [facility] [place] [facility]-   2/11/2026  4:00 PM Sue Pilek, LCSW [facility] [place] [facility]-   2/13/2026  2:30 PM CLARENCE Jeter SURG ONC ALL Practice-Onc   3/11/2026  4:00 PM Sue Pilek, LCSW [facility] [place] [facility]   3/25/2026  4:00 PM Sue Pilek, LCSW [facility] [place] [facility]-   4/22/2026  4:00 PM Sue Pilek, LCSW [facility] [place] [facility]-   5/6/2026  4:00 PM Sue Pilek, LCSW [facility] [place] [facility]-   7/17/2026 10:30 AM Niyah Malloy MD Complete  Practice-[facility]                 [1]   Past Medical History:  Diagnosis Date    Allergic within the last 3 years    Ron Pedro, seasonal    Anxiety 2011    Arthritis     Bronchitis 09/21/2023    Depression     Fall from slipping 08/13/2023    Head injury 08/13/2023    Migraines     Neck strain 08/13/2023    Obstructive sleep apnea on CPAP 12/08/2024    Post-concussion syndrome 08/15/2023    Sleep difficulties    [2]   Past Surgical History:  Procedure Laterality Date    SHOULDER ARTHROSCOPY Left     SHOULDER SURGERY      WISDOM TOOTH EXTRACTION      WISDOM TOOTH EXTRACTION     [3]   Family History  Problem Relation Name Age of Onset    Heart failure Mother Prosper Kinsey     Asthma Mother Prosper Kinsey     Arthritis Mother Prosper Kinsey         RA    Thyroid cancer Father Father's sister, cousins, and great aunts 40    Heart disease Father Father's sister, cousins, and great aunts     Thyroid disease Father Father's sister, cousins, and great aunts     Cancer Father Father's sister, cousins, and great aunts         thyroid    Mental illness Father Father's sister, cousins, and great aunts     Breast cancer Father Father's sister, cousins, and great aunts     No Known Problems Maternal Grandmother      Hypertension Maternal Grandfather Bay Ordaz     Diabetes Maternal  Grandfather Bay Ordaz     Diabetes Paternal Grandmother Kimberly Marily     Hypertension Paternal Grandmother Kimberly Marily     Pancreatic cancer Paternal Grandmother Kimberly Calixto 60    Mental illness Paternal Grandmother Kimberly Marily     Depression Paternal Grandmother Kimberly Marily     Stroke Paternal Grandmother Kimberly Marily     Arthritis Paternal Grandmother Kimberly Tsaikelvin     Cancer Paternal Grandmother Kimberly Marily         Pancreatic    Anxiety disorder Paternal Grandmother Kimberly Eulaliokelvin     Suicide Attempts Paternal Grandmother Kimberly Marily     Diabetes Paternal Grandfather      No Known Problems Maternal Aunt      BRCA2 Positive Paternal Aunt Salma     Thyroid disease Paternal Aunt Salma     Breast cancer Paternal Aunt Salma         late 40s, early 50s    Cancer Paternal Aunt Salma         Thyroid cancer    BRCA2 Positive Paternal Aunt Kassi and cousins     Breast cancer Paternal Aunt Kassi and cousins         late 40s, early 50s    BRCA2 Positive Paternal Aunt      Breast cancer Paternal Aunt          late 40s, early 50s    Thyroid cancer Paternal Aunt  40    Thyroid disease Paternal Aunt Salma Newlon     Cancer Paternal Aunt Salma Newlon         Thyroid    Breast cancer Paternal Aunt Kassi     BRCA2 Positive Cousin      Breast cancer Cousin          late 40s, early 50s    BRCA2 Positive Cousin      Breast cancer Cousin          late 40s, early 50s    Breast cancer Cousin Alvaro Kinsey     Breast cancer Cousin Kimberly Kinsey     Breast cancer Cousin Erin Kinsey     Breast cancer additional onset Neg Hx      BRCA2 Negative Neg Hx      BRCA1 Positive Neg Hx      BRCA 1/2 Neg Hx      BRCA1 Negative Neg Hx      Ovarian cancer Neg Hx      Endometrial cancer Neg Hx      Colon cancer Neg Hx     [4]   Social History  Tobacco Use    Smoking status: Never    Smokeless tobacco: Never    Tobacco comments:     Never used   Vaping Use    Vaping status: Never Used   Substance Use Topics    Alcohol use: Yes      Comment: Occasionally- once every 4 months    Drug use: Never   [5]   Current Outpatient Medications:     escitalopram (LEXAPRO) 20 mg tablet, TAKE 1 TABLET DAILY, Disp: 90 tablet, Rfl: 3    hydrOXYzine HCL (ATARAX) 50 mg tablet, TAKE 1 TABLET BY MOUTH 3 TIMES A DAY AS NEEDED FOR ITCHING, Disp: 270 tablet, Rfl: 1    Omeprazole 20 MG TBEC, Take by mouth, Disp: , Rfl:     oxybutynin (DITROPAN) 5 mg tablet, TAKE 1 TABLET IN THE MORNING, Disp: 90 tablet, Rfl: 3    tirzepatide (Zepbound) 5 mg/0.5 mL auto-injector, Inject 0.5 mL (5 mg total) under the skin once a week, Disp: 2 mL, Rfl: 4    traZODone (DESYREL) 100 mg tablet, TAKE 1 TABLET DAILY AT BEDTIME, Disp: 90 tablet, Rfl: 3    meloxicam (MOBIC) 15 mg tablet, Take 15 mg by mouth in the morning., Disp: , Rfl:     mupirocin (BACTROBAN) 2 % ointment, Apply topically 3 (three) times a day, Disp: 15 g, Rfl: 0

## 2025-07-23 ENCOUNTER — TELEMEDICINE (OUTPATIENT)
Age: 35
End: 2025-07-23

## 2025-07-23 DIAGNOSIS — F32.A ANXIETY AND DEPRESSION: Primary | ICD-10-CM

## 2025-07-23 DIAGNOSIS — F41.9 ANXIETY AND DEPRESSION: Primary | ICD-10-CM

## 2025-07-23 PROCEDURE — 90837 PSYTX W PT 60 MINUTES: CPT

## 2025-07-23 NOTE — PSYCH
"Virtual Regular VisitName: Rachael Petersen      : 1990      MRN: [id number]  Encounter Provider: Sue Cardenas LCSW  Encounter Date: 2025   Encounter department: [facility] [place]  :  Assessment & Plan  Anxiety and depression             Goals addressed in session: Goal 1     DATA: Worked with patient to explore her relationships and identify patterns of boundary violations and challenges. Worked collaboratively with patient to develop skills for assertive communication, setting limits, and expressing personal needs.     During this session, this clinician used the following therapeutic modalities: Cognitive Behavioral Therapy, Mindfulness-based Strategies, and Supportive Psychotherapy    Substance Abuse was not addressed during this session. If the client is diagnosed with a co-occurring substance use disorder, please indicate any changes in the frequency or amount of use: n/a. Stage of change for addressing substance use diagnoses: No substance use/Not applicable    ASSESSMENT:  Rachael presents with a Euthymic/ normal mood. Rachael's affect is Normal range and intensity, which is congruent, with their mood and the content of the session. The client has made progress on their goals as evidenced by as evidenced by using coping strategies to reduce the frequency, intensity and duration of anxiety and depression symptoms as evidenced by self-report and clinician observation.  .    Rachael presents with a none risk of suicide, none risk of self-harm, and none risk of harm to others.    For any risk assessment that surpasses a \"low\" rating, a safety plan must be developed.    A safety plan was indicated: no  If yes, describe in detail n/a    PLAN: Between sessions, Rachael will continue to use coping strategies discussed to manage anxiety and depression.   At the next session, the therapist will use Cognitive Behavioral Therapy, Mindfulness-based Strategies, " and Supportive Psychotherapy to address barriers, redefine strategies for sustainable change and review progress since last session.       Behavioral Health Treatment Plan [facility]: Diagnosis and Treatment Plan explained to Rachael Cano relates understanding diagnosis and is agreeable to Treatment Plan. Yes     Depression Follow-up Plan Completed: Not applicable     Reason for visit is   Chief Complaint   Patient presents with   • Virtual Regular Visit      Recent Visits  No visits were found meeting these conditions.  Showing recent visits within past 7 days and meeting all other requirements  Today's Visits  Date Type Provider Dept   07/23/25 Telemedicine Sue Cardenas LCSW [facility]   Showing today's visits and meeting all other requirements  Future Appointments  No visits were found meeting these conditions.  Showing future appointments within next 150 days and meeting all other requirements     History of Present Illness     HPI    Past Medical History   Past Medical History[1]  Past Surgical History[1]  Current Outpatient Medications   Medication Instructions   • escitalopram (LEXAPRO) 20 mg, Oral, Daily   • hydrOXYzine HCL (ATARAX) 50 mg tablet TAKE 1 TABLET BY MOUTH 3 TIMES A DAY AS NEEDED FOR ITCHING   • meloxicam (MOBIC) 15 mg, Daily   • mupirocin (BACTROBAN) 2 % ointment Topical, 3 times daily   • Omeprazole 20 MG TBEC Take by mouth   • oxybutynin (DITROPAN) 5 mg, Oral, Every morning   • traZODone (DESYREL) 100 mg, Daily at bedtime   • Zepbound 5 mg, Subcutaneous, Weekly     Allergies[1]    Objective   There were no vitals taken for this visit.    Video Exam  Physical Exam     Administrative Statements   Encounter provider Sue Cardenas LCSW    The Patient is located at Home and in the following state in which I hold an active license [place].    The patient was identified by name and date of birth. Rachael Petersen was informed that this is a telemedicine visit and  that the visit is being conducted through the Epic Embedded platform. She agrees to proceed..  My office door was closed. No one else was in the room.  She acknowledged consent and understanding of privacy and security of the video platform. The patient has agreed to participate and understands they can discontinue the visit at any time.    I have spent a total time of 53 minutes in caring for this patient on the day of the visit/encounter including psychotherapy, not including the time spent for establishing the audio/video connection.    Visit Time  Start Time: 1107  Stop Time: 1200  Total Visit Time: 53 minutes         [1]  Past Medical History:  Diagnosis Date   • Allergic within the last 3 years    Ron Pedro, seasonal   • Anxiety 2011   • Arthritis    • Bronchitis 09/21/2023   • Depression    • Fall from slipping 08/13/2023   • Head injury 08/13/2023   • Migraines    • Neck strain 08/13/2023   • Obstructive sleep apnea on CPAP 12/08/2024   • Post-concussion syndrome 08/15/2023   • Sleep difficulties    [1]  Past Surgical History:  Procedure Laterality Date   • SHOULDER ARTHROSCOPY Left    • SHOULDER SURGERY     • WISDOM TOOTH EXTRACTION     • WISDOM TOOTH EXTRACTION     [1]  Allergies  Allergen Reactions   • Bupropion Hives   • Sulfamethoxazole-Trimethoprim Hives

## 2025-08-06 DIAGNOSIS — G47.33 OSA (OBSTRUCTIVE SLEEP APNEA): Primary | ICD-10-CM

## 2025-08-06 DIAGNOSIS — E66.09 CLASS 2 OBESITY DUE TO EXCESS CALORIES WITH BODY MASS INDEX (BMI) OF 38.0 TO 38.9 IN ADULT, UNSPECIFIED WHETHER SERIOUS COMORBIDITY PRESENT: ICD-10-CM

## 2025-08-06 DIAGNOSIS — E78.00 ELEVATED LDL CHOLESTEROL LEVEL: ICD-10-CM

## 2025-08-06 DIAGNOSIS — R73.03 PREDIABETES: ICD-10-CM

## 2025-08-06 DIAGNOSIS — E66.812 CLASS 2 OBESITY DUE TO EXCESS CALORIES WITH BODY MASS INDEX (BMI) OF 38.0 TO 38.9 IN ADULT, UNSPECIFIED WHETHER SERIOUS COMORBIDITY PRESENT: ICD-10-CM

## 2025-08-06 RX ORDER — PHENTERMINE HYDROCHLORIDE 15 MG/1
15 CAPSULE ORAL EVERY MORNING
Qty: 90 CAPSULE | Refills: 0 | Status: SHIPPED | OUTPATIENT
Start: 2025-08-06

## 2025-08-08 ENCOUNTER — HOSPITAL ENCOUNTER (OUTPATIENT)
Dept: MRI IMAGING | Facility: HOSPITAL | Age: 35
Discharge: HOME | End: 2025-08-08

## 2025-08-08 ENCOUNTER — TELEPHONE (OUTPATIENT)
Facility: MEDICAL CENTER | Age: 35
End: 2025-08-08

## 2025-08-13 ENCOUNTER — SOCIAL WORK (OUTPATIENT)
Age: 35
End: 2025-08-13

## 2025-08-23 PROBLEM — E66.9 OBESITY (BMI 30-39.9): Status: ACTIVE | Noted: 2022-07-15
